# Patient Record
Sex: FEMALE | Race: WHITE | Employment: OTHER
[De-identification: names, ages, dates, MRNs, and addresses within clinical notes are randomized per-mention and may not be internally consistent; named-entity substitution may affect disease eponyms.]

---

## 2017-01-03 RX ORDER — FUROSEMIDE 40 MG/1
TABLET ORAL
Qty: 30 TABLET | Refills: 4 | Status: SHIPPED | OUTPATIENT
Start: 2017-01-03 | End: 2017-06-06 | Stop reason: SDUPTHER

## 2017-01-09 ENCOUNTER — TELEPHONE (OUTPATIENT)
Dept: FAMILY MEDICINE CLINIC | Facility: CLINIC | Age: 62
End: 2017-01-09

## 2017-01-09 RX ORDER — LOVASTATIN 40 MG/1
40 TABLET ORAL NIGHTLY
Qty: 30 TABLET | Refills: 3 | Status: SHIPPED | OUTPATIENT
Start: 2017-01-09 | End: 2017-05-09 | Stop reason: SDUPTHER

## 2017-01-13 ENCOUNTER — TELEPHONE (OUTPATIENT)
Dept: FAMILY MEDICINE CLINIC | Facility: CLINIC | Age: 62
End: 2017-01-13

## 2017-01-17 ENCOUNTER — TELEPHONE (OUTPATIENT)
Dept: FAMILY MEDICINE CLINIC | Facility: CLINIC | Age: 62
End: 2017-01-17

## 2017-02-08 RX ORDER — EZETIMIBE 10 MG/1
TABLET ORAL
Qty: 30 TABLET | Refills: 10 | Status: SHIPPED | OUTPATIENT
Start: 2017-02-08 | End: 2017-07-05

## 2017-03-07 RX ORDER — LEVOTHYROXINE SODIUM 0.15 MG/1
TABLET ORAL
Qty: 30 TABLET | Refills: 8 | Status: SHIPPED | OUTPATIENT
Start: 2017-03-07 | End: 2017-11-06 | Stop reason: SDUPTHER

## 2017-03-11 RX ORDER — BENZTROPINE MESYLATE 2 MG/1
TABLET ORAL
Qty: 60 TABLET | Refills: 1 | Status: SHIPPED | OUTPATIENT
Start: 2017-03-11 | End: 2017-05-09 | Stop reason: SDUPTHER

## 2017-03-17 ENCOUNTER — TELEPHONE (OUTPATIENT)
Dept: FAMILY MEDICINE CLINIC | Age: 62
End: 2017-03-17

## 2017-04-11 ENCOUNTER — TELEPHONE (OUTPATIENT)
Dept: FAMILY MEDICINE CLINIC | Age: 62
End: 2017-04-11

## 2017-04-11 DIAGNOSIS — R25.1 TREMOR: Primary | ICD-10-CM

## 2017-04-19 ENCOUNTER — HOSPITAL ENCOUNTER (OUTPATIENT)
Age: 62
Setting detail: SPECIMEN
Discharge: HOME | End: 2017-04-19
Payer: COMMERCIAL

## 2017-04-19 LAB
ALBUMIN SERPL-MCNC: 4.2 G/DL (ref 3.5–5.2)
ALBUMIN/GLOBULIN RATIO: 1.4 (ref 1–2.5)
ALP BLD-CCNC: 61 U/L (ref 35–104)
ALT SERPL-CCNC: 10 U/L (ref 5–33)
ANION GAP SERPL CALCULATED.3IONS-SCNC: 19 MMOL/L (ref 9–17)
AST SERPL-CCNC: 9 U/L
BILIRUB SERPL-MCNC: 0.38 MG/DL (ref 0.3–1.2)
BUN BLDV-MCNC: 30 MG/DL (ref 6–20)
BUN/CREAT BLD: ABNORMAL (ref 9–20)
CALCIUM SERPL-MCNC: 9.2 MG/DL (ref 8.6–10.4)
CHLORIDE BLD-SCNC: 103 MMOL/L (ref 98–107)
CO2: 23 MMOL/L (ref 20–31)
CREAT SERPL-MCNC: 0.75 MG/DL (ref 0.5–0.9)
FOLATE: 10.4 NG/ML
GFR AFRICAN AMERICAN: >60 ML/MIN
GFR NON-AFRICAN AMERICAN: >60 ML/MIN
GFR SERPL CREATININE-BSD FRML MDRD: ABNORMAL ML/MIN/{1.73_M2}
GFR SERPL CREATININE-BSD FRML MDRD: ABNORMAL ML/MIN/{1.73_M2}
GLUCOSE BLD-MCNC: 85 MG/DL (ref 70–99)
POTASSIUM SERPL-SCNC: 4.6 MMOL/L (ref 3.7–5.3)
SODIUM BLD-SCNC: 145 MMOL/L (ref 135–144)
TOTAL PROTEIN: 7.1 G/DL (ref 6.4–8.3)
TSH SERPL DL<=0.05 MIU/L-ACNC: 0.31 MIU/L (ref 0.3–5)
VITAMIN B-12: 386 PG/ML (ref 211–946)

## 2017-05-09 RX ORDER — BENZTROPINE MESYLATE 2 MG/1
TABLET ORAL
Qty: 60 TABLET | Refills: 0 | Status: SHIPPED | OUTPATIENT
Start: 2017-05-09 | End: 2017-06-06 | Stop reason: SDUPTHER

## 2017-05-18 ENCOUNTER — OFFICE VISIT (OUTPATIENT)
Dept: FAMILY MEDICINE CLINIC | Age: 62
End: 2017-05-18
Payer: MEDICARE

## 2017-05-18 VITALS — SYSTOLIC BLOOD PRESSURE: 103 MMHG | HEART RATE: 94 BPM | DIASTOLIC BLOOD PRESSURE: 75 MMHG | TEMPERATURE: 98.6 F

## 2017-05-18 DIAGNOSIS — G89.29 CHRONIC PAIN OF LEFT ANKLE: ICD-10-CM

## 2017-05-18 DIAGNOSIS — M25.572 CHRONIC PAIN OF LEFT ANKLE: ICD-10-CM

## 2017-05-18 PROCEDURE — 99214 OFFICE O/P EST MOD 30 MIN: CPT | Performed by: PHYSICIAN ASSISTANT

## 2017-05-18 RX ORDER — CLONAZEPAM 0.5 MG/1
1 TABLET ORAL 3 TIMES DAILY
COMMUNITY
Start: 2017-04-18 | End: 2017-07-05

## 2017-06-06 RX ORDER — FUROSEMIDE 40 MG/1
TABLET ORAL
Qty: 30 TABLET | Refills: 3 | Status: SHIPPED | OUTPATIENT
Start: 2017-06-06 | End: 2017-10-08 | Stop reason: SDUPTHER

## 2017-06-06 RX ORDER — BENZTROPINE MESYLATE 2 MG/1
TABLET ORAL
Qty: 60 TABLET | Refills: 0 | Status: SHIPPED | OUTPATIENT
Start: 2017-06-06 | End: 2017-07-05

## 2017-07-05 ENCOUNTER — HOSPITAL ENCOUNTER (EMERGENCY)
Age: 62
Discharge: HOME | End: 2017-07-05
Attending: EMERGENCY MEDICINE
Payer: MEDICARE

## 2017-07-05 VITALS
OXYGEN SATURATION: 97 % | WEIGHT: 185 LBS | BODY MASS INDEX: 29.03 KG/M2 | SYSTOLIC BLOOD PRESSURE: 156 MMHG | TEMPERATURE: 96.1 F | HEART RATE: 78 BPM | HEIGHT: 67 IN | DIASTOLIC BLOOD PRESSURE: 104 MMHG | RESPIRATION RATE: 17 BRPM

## 2017-07-05 DIAGNOSIS — F44.5 PSEUDOSEIZURE: Primary | ICD-10-CM

## 2017-07-05 LAB
PROLACTIN: 7.83 UG/L (ref 4.79–23.3)
VALPROIC ACID LEVEL: 25 UG/ML (ref 50–100)
VALPROIC DATE LAST DOSE: ABNORMAL
VALPROIC DOSE AMOUNT: ABNORMAL
VALPROIC TIME LAST DOSE: ABNORMAL

## 2017-07-05 PROCEDURE — 80164 ASSAY DIPROPYLACETIC ACD TOT: CPT

## 2017-07-05 PROCEDURE — 84146 ASSAY OF PROLACTIN: CPT

## 2017-07-05 PROCEDURE — 99284 EMERGENCY DEPT VISIT MOD MDM: CPT

## 2017-07-05 RX ORDER — PRIMIDONE 50 MG/1
50 TABLET ORAL 2 TIMES DAILY PRN
COMMUNITY
Start: 2017-05-24 | End: 2019-11-12

## 2017-07-05 ASSESSMENT — ENCOUNTER SYMPTOMS
COLOR CHANGE: 0
ABDOMINAL PAIN: 0
WHEEZING: 0
SHORTNESS OF BREATH: 0
RHINORRHEA: 0
NAUSEA: 0
SINUS PRESSURE: 0
COUGH: 0
CONSTIPATION: 0
SORE THROAT: 0
VOMITING: 0
DIARRHEA: 0

## 2017-07-13 ENCOUNTER — OFFICE VISIT (OUTPATIENT)
Dept: FAMILY MEDICINE CLINIC | Age: 62
End: 2017-07-13
Payer: MEDICARE

## 2017-07-13 ENCOUNTER — HOSPITAL ENCOUNTER (OUTPATIENT)
Age: 62
Setting detail: SPECIMEN
Discharge: HOME | End: 2017-07-13
Payer: MEDICARE

## 2017-07-13 VITALS
HEIGHT: 67 IN | HEART RATE: 76 BPM | WEIGHT: 206 LBS | RESPIRATION RATE: 18 BRPM | SYSTOLIC BLOOD PRESSURE: 120 MMHG | OXYGEN SATURATION: 98 % | BODY MASS INDEX: 32.33 KG/M2 | DIASTOLIC BLOOD PRESSURE: 75 MMHG

## 2017-07-13 DIAGNOSIS — F03.90: Primary | ICD-10-CM

## 2017-07-13 DIAGNOSIS — Z13.9 SCREENING: ICD-10-CM

## 2017-07-13 DIAGNOSIS — J42 CHRONIC BRONCHITIS, UNSPECIFIED CHRONIC BRONCHITIS TYPE (HCC): ICD-10-CM

## 2017-07-13 DIAGNOSIS — E11.9 TYPE 2 DIABETES MELLITUS WITHOUT COMPLICATION, WITHOUT LONG-TERM CURRENT USE OF INSULIN (HCC): ICD-10-CM

## 2017-07-13 DIAGNOSIS — J44.9 CHRONIC OBSTRUCTIVE BRONCHITIS (HCC): ICD-10-CM

## 2017-07-13 DIAGNOSIS — E03.9 ACQUIRED HYPOTHYROIDISM: ICD-10-CM

## 2017-07-13 DIAGNOSIS — R07.1 CHEST PAIN ON BREATHING: ICD-10-CM

## 2017-07-13 LAB
CREATININE URINE: 48.9 MG/DL (ref 28–217)
HBA1C MFR BLD: 5.8 %
MICROALBUMIN/CREAT 24H UR: <12 MG/L
MICROALBUMIN/CREAT UR-RTO: 25 MCG/MG CREAT

## 2017-07-13 PROCEDURE — 99214 OFFICE O/P EST MOD 30 MIN: CPT | Performed by: FAMILY MEDICINE

## 2017-07-13 PROCEDURE — 83036 HEMOGLOBIN GLYCOSYLATED A1C: CPT | Performed by: FAMILY MEDICINE

## 2017-07-13 RX ORDER — DONEPEZIL HYDROCHLORIDE 5 MG/1
5 TABLET, FILM COATED ORAL NIGHTLY
Qty: 90 TABLET | Refills: 3 | Status: SHIPPED | OUTPATIENT
Start: 2017-07-13 | End: 2018-08-17

## 2017-07-13 ASSESSMENT — PATIENT HEALTH QUESTIONNAIRE - PHQ9
SUM OF ALL RESPONSES TO PHQ QUESTIONS 1-9: 0
SUM OF ALL RESPONSES TO PHQ9 QUESTIONS 1 & 2: 0
2. FEELING DOWN, DEPRESSED OR HOPELESS: 0
1. LITTLE INTEREST OR PLEASURE IN DOING THINGS: 0

## 2017-07-27 ENCOUNTER — HOSPITAL ENCOUNTER (OUTPATIENT)
Age: 62
Setting detail: SPECIMEN
Discharge: HOME | End: 2017-07-27
Payer: MEDICARE

## 2017-07-27 DIAGNOSIS — Z13.9 SCREENING: ICD-10-CM

## 2017-07-27 LAB
HEPATITIS C ANTIBODY: NONREACTIVE
HIV AG/AB: NONREACTIVE

## 2017-08-11 ENCOUNTER — HOSPITAL ENCOUNTER (OUTPATIENT)
Age: 62
Setting detail: SPECIMEN
Discharge: HOME | End: 2017-08-11
Payer: MEDICARE

## 2017-08-11 LAB
VALPROIC ACID LEVEL: 28 UG/ML (ref 50–100)
VALPROIC DATE LAST DOSE: ABNORMAL
VALPROIC DOSE AMOUNT: ABNORMAL
VALPROIC TIME LAST DOSE: ABNORMAL

## 2017-08-18 ENCOUNTER — HOSPITAL ENCOUNTER (OUTPATIENT)
Dept: MAMMOGRAPHY | Age: 62
Discharge: HOME | End: 2017-08-18
Payer: MEDICARE

## 2017-08-18 DIAGNOSIS — Z12.31 ENCOUNTER FOR SCREENING MAMMOGRAM FOR BREAST CANCER: ICD-10-CM

## 2017-08-18 PROCEDURE — 77063 BREAST TOMOSYNTHESIS BI: CPT

## 2017-08-25 ENCOUNTER — HOSPITAL ENCOUNTER (OUTPATIENT)
Dept: NON INVASIVE DIAGNOSTICS | Age: 62
Discharge: HOME | End: 2017-08-25
Payer: MEDICARE

## 2017-08-25 ENCOUNTER — HOSPITAL ENCOUNTER (OUTPATIENT)
Dept: NUCLEAR MEDICINE | Age: 62
Discharge: HOME | End: 2017-08-25
Payer: MEDICARE

## 2017-08-25 VITALS — SYSTOLIC BLOOD PRESSURE: 118 MMHG | RESPIRATION RATE: 14 BRPM | HEART RATE: 70 BPM | DIASTOLIC BLOOD PRESSURE: 80 MMHG

## 2017-08-25 DIAGNOSIS — R07.1 CHEST PAIN ON BREATHING: ICD-10-CM

## 2017-08-25 DIAGNOSIS — J44.9 CHRONIC OBSTRUCTIVE BRONCHITIS (HCC): ICD-10-CM

## 2017-08-25 LAB
LV EF: 67 %
LVEF MODALITY: NORMAL

## 2017-08-25 PROCEDURE — 3430000000 HC RX DIAGNOSTIC RADIOPHARMACEUTICAL: Performed by: FAMILY MEDICINE

## 2017-08-25 PROCEDURE — 6360000002 HC RX W HCPCS: Performed by: FAMILY MEDICINE

## 2017-08-25 PROCEDURE — 78452 HT MUSCLE IMAGE SPECT MULT: CPT

## 2017-08-25 PROCEDURE — 93017 CV STRESS TEST TRACING ONLY: CPT

## 2017-08-25 PROCEDURE — A9500 TC99M SESTAMIBI: HCPCS | Performed by: FAMILY MEDICINE

## 2017-08-25 RX ORDER — METOPROLOL TARTRATE 5 MG/5ML
2.5 INJECTION INTRAVENOUS PRN
Status: DISCONTINUED | OUTPATIENT
Start: 2017-08-25 | End: 2017-08-26 | Stop reason: HOSPADM

## 2017-08-25 RX ORDER — 0.9 % SODIUM CHLORIDE 0.9 %
250 INTRAVENOUS SOLUTION INTRAVENOUS ONCE
Status: DISCONTINUED | OUTPATIENT
Start: 2017-08-25 | End: 2017-08-26 | Stop reason: HOSPADM

## 2017-08-25 RX ORDER — NITROGLYCERIN 0.4 MG/1
0.4 TABLET SUBLINGUAL EVERY 5 MIN PRN
Status: DISCONTINUED | OUTPATIENT
Start: 2017-08-25 | End: 2017-08-26 | Stop reason: HOSPADM

## 2017-08-25 RX ORDER — SODIUM CHLORIDE 0.9 % (FLUSH) 0.9 %
10 SYRINGE (ML) INJECTION PRN
Status: DISCONTINUED | OUTPATIENT
Start: 2017-08-25 | End: 2017-08-26 | Stop reason: HOSPADM

## 2017-08-25 RX ORDER — AMINOPHYLLINE DIHYDRATE 25 MG/ML
100 INJECTION, SOLUTION INTRAVENOUS
Status: ACTIVE | OUTPATIENT
Start: 2017-08-25 | End: 2017-08-25

## 2017-08-25 RX ADMIN — TETRAKIS(2-METHOXYISOBUTYLISOCYANIDE)COPPER(I) TETRAFLUOROBORATE 42.3 MILLICURIE: 1 INJECTION, POWDER, LYOPHILIZED, FOR SOLUTION INTRAVENOUS at 11:10

## 2017-08-25 RX ADMIN — TETRAKIS(2-METHOXYISOBUTYLISOCYANIDE)COPPER(I) TETRAFLUOROBORATE 18.1 MILLICURIE: 1 INJECTION, POWDER, LYOPHILIZED, FOR SOLUTION INTRAVENOUS at 08:25

## 2017-08-25 RX ADMIN — REGADENOSON 0.4 MG: 0.08 INJECTION, SOLUTION INTRAVENOUS at 08:19

## 2017-09-01 DIAGNOSIS — R94.39 ABNORMAL MYOCARDIAL PERFUSION STUDY: Primary | ICD-10-CM

## 2017-09-08 ENCOUNTER — TELEPHONE (OUTPATIENT)
Dept: FAMILY MEDICINE CLINIC | Age: 62
End: 2017-09-08

## 2017-09-08 DIAGNOSIS — R94.39 OTHER NONSPECIFIC ABNORMAL CARDIOVASCULAR SYSTEM FUNCTION STUDY: Primary | ICD-10-CM

## 2017-09-13 ENCOUNTER — OFFICE VISIT (OUTPATIENT)
Dept: FAMILY MEDICINE CLINIC | Age: 62
End: 2017-09-13
Payer: MEDICARE

## 2017-09-13 VITALS
HEART RATE: 72 BPM | SYSTOLIC BLOOD PRESSURE: 130 MMHG | DIASTOLIC BLOOD PRESSURE: 84 MMHG | TEMPERATURE: 99 F | OXYGEN SATURATION: 98 %

## 2017-09-13 DIAGNOSIS — J20.9 ACUTE BRONCHITIS WITH BRONCHOSPASM: Primary | ICD-10-CM

## 2017-09-13 PROCEDURE — 99214 OFFICE O/P EST MOD 30 MIN: CPT | Performed by: NURSE PRACTITIONER

## 2017-09-13 RX ORDER — GUAIFENESIN AND CODEINE PHOSPHATE 100; 10 MG/5ML; MG/5ML
10 SOLUTION ORAL 4 TIMES DAILY PRN
Qty: 118 ML | Refills: 0 | Status: SHIPPED | OUTPATIENT
Start: 2017-09-13 | End: 2017-12-27 | Stop reason: SDUPTHER

## 2017-09-13 RX ORDER — LEVOFLOXACIN 500 MG/1
500 TABLET, FILM COATED ORAL DAILY
Qty: 10 TABLET | Refills: 0 | Status: SHIPPED | OUTPATIENT
Start: 2017-09-13 | End: 2018-01-03 | Stop reason: SDUPTHER

## 2017-09-13 RX ORDER — RISPERIDONE 2 MG/1
1 TABLET, FILM COATED ORAL 3 TIMES DAILY
COMMUNITY
Start: 2017-09-07 | End: 2019-05-29

## 2017-09-13 ASSESSMENT — ENCOUNTER SYMPTOMS
EYE REDNESS: 0
SHORTNESS OF BREATH: 0
RHINORRHEA: 1
CHEST TIGHTNESS: 0
SINUS PRESSURE: 0
VOICE CHANGE: 0
COUGH: 1
SORE THROAT: 1
EYE DISCHARGE: 0
WHEEZING: 0

## 2017-10-05 ENCOUNTER — HOSPITAL ENCOUNTER (OUTPATIENT)
Age: 62
Setting detail: SPECIMEN
Discharge: HOME | End: 2017-10-05
Payer: MEDICARE

## 2017-10-05 LAB
ANION GAP SERPL CALCULATED.3IONS-SCNC: 10 MMOL/L (ref 9–17)
BUN BLDV-MCNC: 18 MG/DL (ref 6–20)
BUN/CREAT BLD: NORMAL (ref 9–20)
CALCIUM SERPL-MCNC: 9 MG/DL (ref 8.6–10.4)
CHLORIDE BLD-SCNC: 99 MMOL/L (ref 98–107)
CO2: 28 MMOL/L (ref 20–31)
CREAT SERPL-MCNC: 0.71 MG/DL (ref 0.5–0.9)
GFR AFRICAN AMERICAN: >60 ML/MIN
GFR NON-AFRICAN AMERICAN: >60 ML/MIN
GFR SERPL CREATININE-BSD FRML MDRD: NORMAL ML/MIN/{1.73_M2}
GFR SERPL CREATININE-BSD FRML MDRD: NORMAL ML/MIN/{1.73_M2}
GLUCOSE BLD-MCNC: 96 MG/DL (ref 70–99)
HCT VFR BLD CALC: 43.8 % (ref 36–46)
HEMOGLOBIN: 14.7 G/DL (ref 12–16)
MCH RBC QN AUTO: 30 PG (ref 26–34)
MCHC RBC AUTO-ENTMCNC: 33.5 G/DL (ref 31–37)
MCV RBC AUTO: 89.6 FL (ref 80–100)
PDW BLD-RTO: 13.6 % (ref 12.5–15.4)
PLATELET # BLD: 208 K/UL (ref 140–450)
PMV BLD AUTO: 8 FL (ref 6–12)
POTASSIUM SERPL-SCNC: 4.4 MMOL/L (ref 3.7–5.3)
RBC # BLD: 4.89 M/UL (ref 4–5.2)
SODIUM BLD-SCNC: 137 MMOL/L (ref 135–144)
WBC # BLD: 8.2 K/UL (ref 3.5–11)

## 2017-10-10 ENCOUNTER — HOSPITAL ENCOUNTER (OUTPATIENT)
Dept: CARDIAC CATH/INVASIVE PROCEDURES | Age: 62
Discharge: HOME | End: 2017-10-10
Attending: INTERNAL MEDICINE | Admitting: INTERNAL MEDICINE
Payer: MEDICARE

## 2017-10-10 VITALS
HEART RATE: 68 BPM | DIASTOLIC BLOOD PRESSURE: 93 MMHG | HEIGHT: 67 IN | TEMPERATURE: 97.6 F | RESPIRATION RATE: 16 BRPM | BODY MASS INDEX: 32.91 KG/M2 | OXYGEN SATURATION: 95 % | WEIGHT: 209.66 LBS | SYSTOLIC BLOOD PRESSURE: 140 MMHG

## 2017-10-10 PROBLEM — I20.0 UNSTABLE ANGINA (HCC): Status: ACTIVE | Noted: 2017-10-10

## 2017-10-10 LAB
EKG ATRIAL RATE: 67 BPM
EKG P AXIS: 59 DEGREES
EKG P-R INTERVAL: 150 MS
EKG Q-T INTERVAL: 402 MS
EKG QRS DURATION: 68 MS
EKG QTC CALCULATION (BAZETT): 424 MS
EKG R AXIS: -7 DEGREES
EKG T AXIS: 42 DEGREES
EKG VENTRICULAR RATE: 67 BPM

## 2017-10-10 PROCEDURE — C1760 CLOSURE DEV, VASC: HCPCS

## 2017-10-10 PROCEDURE — 6360000002 HC RX W HCPCS

## 2017-10-10 PROCEDURE — C1725 CATH, TRANSLUMIN NON-LASER: HCPCS

## 2017-10-10 PROCEDURE — 93005 ELECTROCARDIOGRAM TRACING: CPT

## 2017-10-10 PROCEDURE — 2500000003 HC RX 250 WO HCPCS

## 2017-10-10 PROCEDURE — 2580000003 HC RX 258: Performed by: INTERNAL MEDICINE

## 2017-10-10 PROCEDURE — 93458 L HRT ARTERY/VENTRICLE ANGIO: CPT | Performed by: INTERNAL MEDICINE

## 2017-10-10 PROCEDURE — C1894 INTRO/SHEATH, NON-LASER: HCPCS

## 2017-10-10 RX ORDER — ONDANSETRON 2 MG/ML
4 INJECTION INTRAMUSCULAR; INTRAVENOUS EVERY 6 HOURS PRN
Status: DISCONTINUED | OUTPATIENT
Start: 2017-10-10 | End: 2017-10-10 | Stop reason: HOSPADM

## 2017-10-10 RX ORDER — SODIUM CHLORIDE 0.9 % (FLUSH) 0.9 %
10 SYRINGE (ML) INJECTION EVERY 12 HOURS SCHEDULED
Status: DISCONTINUED | OUTPATIENT
Start: 2017-10-10 | End: 2017-10-10 | Stop reason: HOSPADM

## 2017-10-10 RX ORDER — SODIUM CHLORIDE 0.9 % (FLUSH) 0.9 %
10 SYRINGE (ML) INJECTION PRN
Status: DISCONTINUED | OUTPATIENT
Start: 2017-10-10 | End: 2017-10-10 | Stop reason: HOSPADM

## 2017-10-10 RX ORDER — SODIUM CHLORIDE 9 MG/ML
INJECTION, SOLUTION INTRAVENOUS CONTINUOUS
Status: DISCONTINUED | OUTPATIENT
Start: 2017-10-10 | End: 2017-10-10 | Stop reason: HOSPADM

## 2017-10-10 RX ORDER — ACETAMINOPHEN 325 MG/1
650 TABLET ORAL EVERY 4 HOURS PRN
Status: DISCONTINUED | OUTPATIENT
Start: 2017-10-10 | End: 2017-10-10 | Stop reason: HOSPADM

## 2017-10-10 RX ORDER — FUROSEMIDE 40 MG/1
TABLET ORAL
Qty: 30 TABLET | Refills: 2 | Status: SHIPPED | OUTPATIENT
Start: 2017-10-10 | End: 2017-11-06 | Stop reason: SDUPTHER

## 2017-10-10 RX ADMIN — SODIUM CHLORIDE: 9 INJECTION, SOLUTION INTRAVENOUS at 14:33

## 2017-10-10 ASSESSMENT — PAIN DESCRIPTION - PROGRESSION: CLINICAL_PROGRESSION: NOT CHANGED

## 2017-10-10 ASSESSMENT — PAIN DESCRIPTION - PAIN TYPE: TYPE: ACUTE PAIN

## 2017-10-10 ASSESSMENT — PAIN DESCRIPTION - FREQUENCY: FREQUENCY: CONTINUOUS

## 2017-10-10 ASSESSMENT — PAIN DESCRIPTION - ONSET: ONSET: GRADUAL

## 2017-10-10 ASSESSMENT — PAIN DESCRIPTION - LOCATION: LOCATION: GROIN

## 2017-10-10 ASSESSMENT — PAIN SCALES - GENERAL: PAINLEVEL_OUTOF10: 5

## 2017-10-10 ASSESSMENT — PAIN DESCRIPTION - DESCRIPTORS: DESCRIPTORS: BURNING

## 2017-10-10 ASSESSMENT — PAIN DESCRIPTION - ORIENTATION: ORIENTATION: RIGHT

## 2017-10-10 ASSESSMENT — PAIN - FUNCTIONAL ASSESSMENT: PAIN_FUNCTIONAL_ASSESSMENT: 0-10

## 2017-10-10 NOTE — BRIEF OP NOTE
Brief Postoperative Note  ______________________________________________________________    Patient: Gama Mejia  YOB: 1955  MRN: [id number]  Date of Procedure: 10/10/2017    Pre-Op Diagnosis: UNSTABLE ANGINA. Post-Op Diagnosis: NO SIGNIFICANT EPICARDIAL CAD. Anesthesia: VERSED 2 MG. FENTANYL 50 MICROGRAMS. SURGEON:  FLOWER Cai MD.  Staff:       Estimated Blood Loss:LESS THAN 5 ML  Complications: None                Drains:      Findings: NO SIGNIFICANT EPICARDIAL CAD. PRESERVED LVEF.     Merissa Larsen MD  Date: 10/10/2017  Time: 3:11 PM

## 2017-10-10 NOTE — POST SEDATION
Sedation Post Procedure Note    Patient Name: Alejandra Johnson   YOB: 1955  Room/Bed: [facility] CATH Pool/NONE  Medical Record Number: [id number]  Date: 10/10/2017   Time: 3:15 PM         Physicians/Assistants: Mauricio Mario MD    Procedure Performed:  CARDIAC CATH.   Post-Sedation Vital Signs:  Vitals:    10/10/17 1357   BP: (!) 144/75   Pulse: 71   Resp: 16   Temp: 98.4 °F (36.9 °C)   SpO2: 99%      Vital signs were reviewed and were stable after the procedure (see flow sheet for vitals)            Post-Sedation Exam: Lungs: clear           Complications: none    Electronically signed by Mauricio Mario MD on 10/10/2017 at 3:15 PM

## 2017-10-10 NOTE — PRE SEDATION
tablet Take 500 mg by mouth 3 times daily  9/28/15  Yes Historical Provider, MD   traZODone (DESYREL) 150 MG tablet Take 150 mg by mouth 2 times daily 2 tablets orally per day 7/24/15  Yes Historical Provider, MD   risperiDONE (RISPERDAL) 2 MG tablet Take 1 tablet by mouth daily 9/7/17   Historical Provider, MD   donepezil (ARICEPT) 5 MG tablet Take 1 tablet by mouth nightly 7/13/17   Lisa Malik, DO   benztropine (COGENTIN) 2 MG tablet TAKE ONE TABLET BY MOUTH TWICE A DAY 7/7/17   Lisa Malik, DO   lovastatin (MEVACOR) 40 MG tablet TAKE ONE TABLET BY MOUTH ONCE NIGHTLY 5/9/17   Lisa Malik, DO   enoxaparin (LOVENOX) 40 MG/0.4ML injection  10/8/16   Historical Provider, MD   mometasone-formoterol (DULERA) 200-5 MCG/ACT inhaler Inhale 2 puffs into the lungs 2 times daily. Rinse mouth after using. 4/24/14   Lisa Malik, DO     Coumadin Use Last 7 Days:  no  Antiplatelet drug therapy use last 7 days: yes   Other anticoagulant use last 7 days: no  Additional Medication Information:  seedatabase. Pre-Sedation Documentation and Exam:   I have personally completed a history, physical exam & review of systems for this patient (see notes).     Mallampati Airway Assessment:  Mallampati Class III - (soft palate & base of uvula are visible)    Prior History of Anesthesia Complications:   none    ASA Classification:  Class 3 - A patient with severe systemic disease that limits activity but is not incapacitating    Sedation/ Anesthesia Plan:   intravenous sedation    Medications Planned:   midazolam (Versed) intravenously    Patient is an appropriate candidate for plan of sedation: yes    Electronically signed by Fernando Stark MD on 10/10/2017 at 2:30 PM

## 2017-11-06 RX ORDER — FUROSEMIDE 40 MG/1
TABLET ORAL
Qty: 90 TABLET | Refills: 1 | Status: SHIPPED | OUTPATIENT
Start: 2017-11-06 | End: 2018-08-17

## 2017-11-06 RX ORDER — LEVOTHYROXINE SODIUM 0.15 MG/1
TABLET ORAL
Qty: 90 TABLET | Refills: 3 | Status: SHIPPED | OUTPATIENT
Start: 2017-11-06 | End: 2018-02-18 | Stop reason: SDUPTHER

## 2017-12-27 ENCOUNTER — OFFICE VISIT (OUTPATIENT)
Dept: FAMILY MEDICINE CLINIC | Age: 62
End: 2017-12-27
Payer: MEDICARE

## 2017-12-27 VITALS
HEART RATE: 92 BPM | SYSTOLIC BLOOD PRESSURE: 130 MMHG | DIASTOLIC BLOOD PRESSURE: 72 MMHG | HEIGHT: 67 IN | BODY MASS INDEX: 34.84 KG/M2 | OXYGEN SATURATION: 98 % | TEMPERATURE: 99.6 F | WEIGHT: 222 LBS

## 2017-12-27 DIAGNOSIS — J40 BRONCHITIS: Primary | ICD-10-CM

## 2017-12-27 DIAGNOSIS — R50.9 FEVER, UNSPECIFIED FEVER CAUSE: ICD-10-CM

## 2017-12-27 LAB
INFLUENZA A ANTIBODY: NEGATIVE
INFLUENZA B ANTIBODY: NEGATIVE

## 2017-12-27 PROCEDURE — 3014F SCREEN MAMMO DOC REV: CPT | Performed by: NURSE PRACTITIONER

## 2017-12-27 PROCEDURE — 99214 OFFICE O/P EST MOD 30 MIN: CPT | Performed by: NURSE PRACTITIONER

## 2017-12-27 PROCEDURE — 3017F COLORECTAL CA SCREEN DOC REV: CPT | Performed by: NURSE PRACTITIONER

## 2017-12-27 PROCEDURE — 87804 INFLUENZA ASSAY W/OPTIC: CPT | Performed by: NURSE PRACTITIONER

## 2017-12-27 PROCEDURE — G8417 CALC BMI ABV UP PARAM F/U: HCPCS | Performed by: NURSE PRACTITIONER

## 2017-12-27 PROCEDURE — 4004F PT TOBACCO SCREEN RCVD TLK: CPT | Performed by: NURSE PRACTITIONER

## 2017-12-27 PROCEDURE — G8427 DOCREV CUR MEDS BY ELIG CLIN: HCPCS | Performed by: NURSE PRACTITIONER

## 2017-12-27 PROCEDURE — G8484 FLU IMMUNIZE NO ADMIN: HCPCS | Performed by: NURSE PRACTITIONER

## 2017-12-27 PROCEDURE — G8598 ASA/ANTIPLAT THER USED: HCPCS | Performed by: NURSE PRACTITIONER

## 2017-12-27 RX ORDER — GUAIFENESIN AND CODEINE PHOSPHATE 100; 10 MG/5ML; MG/5ML
10 SOLUTION ORAL 4 TIMES DAILY PRN
Qty: 118 ML | Refills: 0 | Status: SHIPPED | OUTPATIENT
Start: 2017-12-27 | End: 2018-01-03 | Stop reason: SDUPTHER

## 2017-12-27 RX ORDER — DOXYCYCLINE HYCLATE 100 MG
100 TABLET ORAL 2 TIMES DAILY
Qty: 20 TABLET | Refills: 0 | Status: SHIPPED | OUTPATIENT
Start: 2017-12-27 | End: 2018-01-06

## 2017-12-27 RX ORDER — ALBUTEROL SULFATE 2.5 MG/3ML
2.5 SOLUTION RESPIRATORY (INHALATION) EVERY 6 HOURS PRN
Qty: 120 EACH | Refills: 0 | Status: SHIPPED | OUTPATIENT
Start: 2017-12-27 | End: 2020-08-04

## 2017-12-27 ASSESSMENT — ENCOUNTER SYMPTOMS
EYE DISCHARGE: 0
SINUS PRESSURE: 0
VOICE CHANGE: 0
CHEST TIGHTNESS: 0
EYE REDNESS: 0
SHORTNESS OF BREATH: 1
WHEEZING: 1
SORE THROAT: 1
COUGH: 1

## 2017-12-27 NOTE — PROGRESS NOTES
mLs by mouth 4 times daily as needed for Cough . 118 mL 0    levothyroxine (SYNTHROID) 150 MCG tablet TAKE ONE TABLET BY MOUTH DAILY 90 tablet 3    risperiDONE (RISPERDAL) 2 MG tablet Take 1 tablet by mouth daily      primidone (MYSOLINE) 50 MG tablet Take 50 mg by mouth 2 times daily as needed       ranitidine (ZANTAC) 300 MG capsule Take 300 mg by mouth every evening       lansoprazole (PREVACID) 30 MG capsule Take 30 mg by mouth daily       divalproex (DEPAKOTE) 500 MG DR tablet Take 500 mg by mouth 3 times daily       traZODone (DESYREL) 150 MG tablet Take 150 mg by mouth 2 times daily 2 tablets orally per day      mometasone-formoterol (DULERA) 200-5 MCG/ACT inhaler Inhale 2 puffs into the lungs 2 times daily. Rinse mouth after using. 2 Inhaler 2    furosemide (LASIX) 40 MG tablet TAKE ONE TABLET BY MOUTH DAILY 90 tablet 1    donepezil (ARICEPT) 5 MG tablet Take 1 tablet by mouth nightly 90 tablet 3    benztropine (COGENTIN) 2 MG tablet TAKE ONE TABLET BY MOUTH TWICE A DAY 60 tablet 5    lovastatin (MEVACOR) 40 MG tablet TAKE ONE TABLET BY MOUTH ONCE NIGHTLY 30 tablet 5    enoxaparin (LOVENOX) 40 MG/0.4ML injection        No current facility-administered medications for this visit.       Allergies   Allergen Reactions    Latex Rash    Biaxin [Clarithromycin] Hives    Dicloxacillin Hives    Pcn [Penicillins] Hives    Zithromax [Azithromycin] Hives    Amoxicillin     Exelon [Rivastigmine Tartrate]     Keflex [Cephalexin]     Lithium     Lyrica [Pregabalin]     Magnesium Trisilicate     Magnesium-Containing Compounds     Nystatin     Prednisone     Rivastigmine Tartrate     Seroquel [Quetiapine Fumarate] Other (See Comments)     Excess weight gain     Symbicort [Budesonide-Formoterol Fumarate] Other (See Comments)     Chest pain    Aricept [Donepezil Hydrochloride] Nausea And Vomiting    Donepezil Hcl Nausea Only    Morphine Other (See Comments)     headaches     Subjective: Review of Systems   Constitutional: Positive for chills and fever. Negative for fatigue. HENT: Positive for sore throat. Negative for congestion, ear discharge, ear pain, postnasal drip, sinus pressure, sneezing and voice change. Eyes: Negative for discharge and redness. Respiratory: Positive for cough, shortness of breath and wheezing. Negative for chest tightness. Cardiovascular: Negative. Negative for chest pain. Musculoskeletal: Negative for myalgias. Skin: Negative for rash. Neurological: Positive for headaches. Negative for dizziness, weakness and light-headedness. Hematological: Negative for adenopathy. All other systems reviewed and are negative. Objective:     Physical Exam   Constitutional: She appears well-developed and well-nourished. HENT:   Head: Normocephalic. Right Ear: Tympanic membrane and external ear normal.   Left Ear: Tympanic membrane and external ear normal.   Nose: Nose normal. Right sinus exhibits no maxillary sinus tenderness and no frontal sinus tenderness. Left sinus exhibits no maxillary sinus tenderness and no frontal sinus tenderness. Mouth/Throat: Oropharyngeal exudate (PND) present. No posterior oropharyngeal erythema. Eyes: Right eye exhibits no discharge. Left eye exhibits no discharge. Cardiovascular: Normal rate, regular rhythm and normal heart sounds. No murmur heard. Pulmonary/Chest: Effort normal. No respiratory distress. She has decreased breath sounds. She has no wheezes. Lymphadenopathy:     She has cervical adenopathy. Skin: Skin is warm. No rash noted. She is not diaphoretic. Nursing note and vitals reviewed.     /72 (Site: Left Arm, Position: Sitting, Cuff Size: Medium Adult)   Pulse 92   Temp 99.6 °F (37.6 °C) (Oral)   Ht 5' 6.5\" (1.689 m)   Wt 222 lb (100.7 kg)   SpO2 98%   BMI 35.29 kg/m²     Results for orders placed or performed in visit on 12/27/17   POCT Influenza A/B   Result Value Ref Range    Influenza A Ab negative     Influenza B Ab negative      Assessment:      1. Bronchitis  albuterol (PROVENTIL) (2.5 MG/3ML) 0.083% nebulizer solution    doxycycline hyclate (VIBRA-TABS) 100 MG tablet    guaiFENesin-codeine (TUSSI-ORGANIDIN NR) 100-10 MG/5ML syrup   2. Fever, unspecified fever cause  POCT Influenza A/B     Plan:      Based on the duration and severity of the symptoms-- I will treat this as bacterial at this time with doxycycline x 10 days. Patient instructed to complete antibiotic prescription fully. Albuterol neb treatments sent to the pharmacy. Encouraged her to do them more routinely during this illness. Cough medication sent to the pharmacy. May use Motrin/Tylenol for fever/pain. Saline washes, salt water gargles and over the counter preparations if desired. Follow up if symptoms do not improve. Orders Placed This Encounter   Medications    albuterol (PROVENTIL) (2.5 MG/3ML) 0.083% nebulizer solution     Sig: Take 3 mLs by nebulization every 6 hours as needed for Wheezing     Dispense:  120 each     Refill:  0    doxycycline hyclate (VIBRA-TABS) 100 MG tablet     Sig: Take 1 tablet by mouth 2 times daily for 10 days     Dispense:  20 tablet     Refill:  0    guaiFENesin-codeine (TUSSI-ORGANIDIN NR) 100-10 MG/5ML syrup     Sig: Take 10 mLs by mouth 4 times daily as needed for Cough . Dispense:  118 mL     Refill:  0       Patient given educational materials - see patient instructions. Discussed use, benefit, and side effects of prescribed medications. All patient questions answered. Pt voiced understanding.     Electronically signed by David Fernandez NP on 12/27/2017 at 11:57 AM

## 2018-01-02 ENCOUNTER — HOSPITAL ENCOUNTER (EMERGENCY)
Age: 63
Discharge: LEFT BEFORE DISCHARGE | End: 2018-01-02
Payer: COMMERCIAL

## 2018-01-02 VITALS
OXYGEN SATURATION: 98 % | WEIGHT: 229.6 LBS | RESPIRATION RATE: 14 BRPM | DIASTOLIC BLOOD PRESSURE: 78 MMHG | SYSTOLIC BLOOD PRESSURE: 130 MMHG | BODY MASS INDEX: 36.03 KG/M2 | TEMPERATURE: 97.8 F | HEIGHT: 67 IN | HEART RATE: 102 BPM

## 2018-01-02 PROCEDURE — 4500000002 HC ER NO CHARGE

## 2018-01-03 ENCOUNTER — HOSPITAL ENCOUNTER (OUTPATIENT)
Dept: GENERAL RADIOLOGY | Age: 63
Discharge: HOME | End: 2018-01-03
Payer: COMMERCIAL

## 2018-01-03 ENCOUNTER — HOSPITAL ENCOUNTER (OUTPATIENT)
Age: 63
Discharge: HOME | End: 2018-01-03
Payer: COMMERCIAL

## 2018-01-03 ENCOUNTER — HOSPITAL ENCOUNTER (OUTPATIENT)
Dept: GENERAL RADIOLOGY | Age: 63
End: 2018-01-03
Payer: COMMERCIAL

## 2018-01-03 ENCOUNTER — OFFICE VISIT (OUTPATIENT)
Dept: FAMILY MEDICINE CLINIC | Age: 63
End: 2018-01-03
Payer: MEDICARE

## 2018-01-03 VITALS
DIASTOLIC BLOOD PRESSURE: 70 MMHG | SYSTOLIC BLOOD PRESSURE: 130 MMHG | HEART RATE: 111 BPM | OXYGEN SATURATION: 94 % | TEMPERATURE: 97.8 F

## 2018-01-03 DIAGNOSIS — R06.00 DYSPNEA, UNSPECIFIED TYPE: ICD-10-CM

## 2018-01-03 DIAGNOSIS — R06.00 DYSPNEA AND RESPIRATORY ABNORMALITY: ICD-10-CM

## 2018-01-03 DIAGNOSIS — R06.89 DYSPNEA AND RESPIRATORY ABNORMALITY: ICD-10-CM

## 2018-01-03 DIAGNOSIS — R05.9 COUGH: ICD-10-CM

## 2018-01-03 DIAGNOSIS — J20.9 ACUTE BRONCHITIS WITH BRONCHOSPASM: Primary | ICD-10-CM

## 2018-01-03 PROCEDURE — 94640 AIRWAY INHALATION TREATMENT: CPT | Performed by: NURSE PRACTITIONER

## 2018-01-03 PROCEDURE — 99214 OFFICE O/P EST MOD 30 MIN: CPT | Performed by: NURSE PRACTITIONER

## 2018-01-03 PROCEDURE — 71046 X-RAY EXAM CHEST 2 VIEWS: CPT

## 2018-01-03 RX ORDER — ALBUTEROL SULFATE 2.5 MG/3ML
2.5 SOLUTION RESPIRATORY (INHALATION) ONCE
Status: COMPLETED | OUTPATIENT
Start: 2018-01-03 | End: 2018-01-03

## 2018-01-03 RX ORDER — GUAIFENESIN AND CODEINE PHOSPHATE 100; 10 MG/5ML; MG/5ML
10 SOLUTION ORAL 4 TIMES DAILY PRN
Qty: 118 ML | Refills: 0 | Status: SHIPPED | OUTPATIENT
Start: 2018-01-03 | End: 2018-01-08

## 2018-01-03 RX ORDER — LEVOFLOXACIN 500 MG/1
500 TABLET, FILM COATED ORAL DAILY
Qty: 7 TABLET | Refills: 0 | Status: SHIPPED | OUTPATIENT
Start: 2018-01-03 | End: 2018-01-10

## 2018-01-03 RX ADMIN — ALBUTEROL SULFATE 2.5 MG: 2.5 SOLUTION RESPIRATORY (INHALATION) at 12:45

## 2018-01-03 ASSESSMENT — ENCOUNTER SYMPTOMS
EYE REDNESS: 0
EYE DISCHARGE: 0
SORE THROAT: 1
SHORTNESS OF BREATH: 1
CHEST TIGHTNESS: 0
COUGH: 1
VOICE CHANGE: 1
WHEEZING: 1
SINUS PRESSURE: 0

## 2018-01-03 NOTE — PROGRESS NOTES
Comments)     Chest pain    Aricept [Donepezil Hydrochloride] Nausea And Vomiting    Donepezil Hcl Nausea Only    Morphine Other (See Comments)     headaches     Reviewed PMH, SH, and FH with the patient and updated. Subjective:      Review of Systems   Constitutional: Negative for chills, fatigue and fever. HENT: Positive for postnasal drip, sore throat and voice change. Negative for congestion, ear discharge, ear pain, sinus pressure and sneezing. Eyes: Negative for discharge and redness. Respiratory: Positive for cough, shortness of breath and wheezing. Negative for chest tightness. Cardiovascular: Negative. Negative for chest pain. Musculoskeletal: Positive for myalgias. Skin: Negative for rash. Neurological: Negative for dizziness, weakness, light-headedness and headaches. Hematological: Negative for adenopathy. All other systems reviewed and are negative. Objective:     Physical Exam   Constitutional: She appears well-developed and well-nourished. HENT:   Head: Normocephalic. Right Ear: Tympanic membrane and external ear normal.   Left Ear: Tympanic membrane and external ear normal.   Nose: Nose normal. Right sinus exhibits no maxillary sinus tenderness and no frontal sinus tenderness. Left sinus exhibits no maxillary sinus tenderness and no frontal sinus tenderness. Mouth/Throat: Oropharynx is clear and moist. No oropharyngeal exudate or posterior oropharyngeal erythema. Eyes: Right eye exhibits no discharge. Left eye exhibits no discharge. Cardiovascular: Normal rate, regular rhythm and normal heart sounds. No murmur heard. Pulmonary/Chest: Effort normal. No respiratory distress. She has decreased breath sounds. She has wheezes. Lymphadenopathy:     She has no cervical adenopathy. Skin: Skin is warm. No rash noted. She is not diaphoretic. Nursing note and vitals reviewed.     /70 (Site: Left Arm, Position: Sitting, Cuff Size: Medium Adult)   Pulse 111

## 2018-01-10 ENCOUNTER — HOSPITAL ENCOUNTER (OUTPATIENT)
Dept: CARDIAC CATH/INVASIVE PROCEDURES | Age: 63
Discharge: HOME | End: 2018-01-10
Attending: INTERNAL MEDICINE | Admitting: INTERNAL MEDICINE
Payer: MEDICARE

## 2018-01-10 VITALS
BODY MASS INDEX: 34.5 KG/M2 | TEMPERATURE: 97.2 F | SYSTOLIC BLOOD PRESSURE: 133 MMHG | RESPIRATION RATE: 22 BRPM | OXYGEN SATURATION: 93 % | WEIGHT: 219.8 LBS | DIASTOLIC BLOOD PRESSURE: 74 MMHG | HEIGHT: 67 IN | HEART RATE: 73 BPM

## 2018-01-10 PROCEDURE — 2500000003 HC RX 250 WO HCPCS

## 2018-01-10 PROCEDURE — 6360000002 HC RX W HCPCS

## 2018-01-10 PROCEDURE — 7100000011 HC PHASE II RECOVERY - ADDTL 15 MIN

## 2018-01-10 PROCEDURE — C1764 EVENT RECORDER, CARDIAC: HCPCS

## 2018-01-10 PROCEDURE — 7100000010 HC PHASE II RECOVERY - FIRST 15 MIN

## 2018-01-10 PROCEDURE — 2580000003 HC RX 258: Performed by: INTERNAL MEDICINE

## 2018-01-10 PROCEDURE — 33282 HC IMPANTABLE LOOP RECORDER: CPT | Performed by: INTERNAL MEDICINE

## 2018-01-10 RX ORDER — VANCOMYCIN HYDROCHLORIDE 1 G/200ML
1000 INJECTION, SOLUTION INTRAVENOUS ONCE
Status: DISCONTINUED | OUTPATIENT
Start: 2018-01-10 | End: 2018-01-10 | Stop reason: HOSPADM

## 2018-01-10 RX ORDER — SODIUM CHLORIDE 9 MG/ML
INJECTION, SOLUTION INTRAVENOUS ONCE
Status: COMPLETED | OUTPATIENT
Start: 2018-01-10 | End: 2018-01-10

## 2018-01-10 RX ORDER — SODIUM CHLORIDE 450 MG/100ML
INJECTION, SOLUTION INTRAVENOUS CONTINUOUS
Status: DISCONTINUED | OUTPATIENT
Start: 2018-01-10 | End: 2018-01-10

## 2018-01-10 RX ADMIN — SODIUM CHLORIDE: 9 INJECTION, SOLUTION INTRAVENOUS at 11:05

## 2018-01-10 ASSESSMENT — PAIN SCALES - GENERAL
PAINLEVEL_OUTOF10: 0

## 2018-01-10 ASSESSMENT — PAIN - FUNCTIONAL ASSESSMENT: PAIN_FUNCTIONAL_ASSESSMENT: 0-10

## 2018-01-10 NOTE — POST SEDATION
Sedation Post Procedure Note    Patient Name: Kevin Abdi   YOB: 1955  Room/Bed: [facility]  Medical Record Number: [id number]  Date: 1/10/2018   Time: 12:39 PM         Physicians/Assistants: Blaze Bonilla MD    Procedure Performed:  LOOP RECORDER IMPLANT.     Post-Sedation Vital Signs:  Vitals:    01/10/18 1049   BP: 133/72   Pulse: 82   Resp: 16   Temp: 97.7 °F (36.5 °C)   SpO2: 97%      Vital signs were reviewed and were stable after the procedure (see flow sheet for vitals)            Post-Sedation Exam: Lungs: clear           Complications: none    Electronically signed by Blaze Bonilla MD on 1/10/2018 at 12:39 PM

## 2018-01-10 NOTE — PRE SEDATION
Sedation Pre-Procedure Note    Patient Name: Eliu Hollingsworth   YOB: 1955  Room/Bed: [facility]  Medical Record Number: [id number]  Date: 1/10/2018   Time: 12:20 PM       Indication:  Near syncope    Consent: I have discussed with the patient and/or the patient representative the indication, alternatives, and the possible risks and/or complications of the planned procedure and the anesthesia methods. The patient and/or patient representative appear to understand and agree to proceed. Vital Signs:   Vitals:    01/10/18 1049   BP: 133/72   Pulse: 82   Resp: 16   Temp: 97.7 °F (36.5 °C)   SpO2: 97%       Past Medical History:   has a past medical history of Alzheimer disease; Alzheimer's disease; Bipolar 2 disorder, major depressive episode ([facility] [address] COPD (chronic obstructive pulmonary disease) ([facility] [address] Depression; Diabetes mellitus ([facility] [address] Hyperlipidemia; Hypertension; NASIR (obstructive sleep apnea); Osteoporosis; Peripheral neuropathy ([facility] [address] Schizophrenia ([facility] [address] Seizures ([facility] [address] and Thyroid disease. Past Surgical History:   has a past surgical history that includes Cholecystectomy; Appendectomy; Hysterectomy; and fracture surgery. Medications:   Scheduled Meds:    vancomycin  1,000 mg Intravenous Once     Continuous Infusions:    PRN Meds:   Home Meds:   Prior to Admission medications    Medication Sig Start Date End Date Taking?  Authorizing Provider   albuterol (PROVENTIL) (2.5 MG/3ML) 0.083% nebulizer solution Take 3 mLs by nebulization every 6 hours as needed for Wheezing 12/27/17  Yes Tiffanie Mchugh NP   levothyroxine (SYNTHROID) 150 MCG tablet TAKE ONE TABLET BY MOUTH DAILY 11/6/17  Yes Lisa Malik DO   risperiDONE (RISPERDAL) 2 MG tablet Take 1 tablet by mouth daily 9/7/17  Yes Historical Provider, MD   primidone (MYSOLINE) 50 MG tablet Take 50 mg by mouth 2 times daily as needed  5/24/17  Yes Historical Provider, MD   ranitidine (ZANTAC) 300 MG capsule Take 300 mg by mouth every evening  6/6/16  Yes Historical Provider, MD   lansoprazole (PREVACID) 30 MG capsule Take 30 mg by mouth daily  2/15/16  Yes Historical Provider, MD   divalproex (DEPAKOTE) 500 MG DR tablet Take 500 mg by mouth 3 times daily  9/28/15  Yes Historical Provider, MD   traZODone (DESYREL) 150 MG tablet Take 150 mg by mouth 2 times daily 2 tablets orally per day 7/24/15  Yes Historical Provider, MD   mometasone-formoterol (DULERA) 200-5 MCG/ACT inhaler Inhale 2 puffs into the lungs 2 times daily. Rinse mouth after using. 4/24/14  Yes Lisa Malik,    levofloxacin (LEVAQUIN) 500 MG tablet Take 1 tablet by mouth daily for 7 days 1/3/18 1/10/18  Aury Ramirez NP   furosemide (LASIX) 40 MG tablet TAKE ONE TABLET BY MOUTH DAILY 11/6/17   Lisa Malik, DO   donepezil (ARICEPT) 5 MG tablet Take 1 tablet by mouth nightly 7/13/17   Lisa Malik, DO   benztropine (COGENTIN) 2 MG tablet TAKE ONE TABLET BY MOUTH TWICE A DAY 7/7/17   Lisa Malik, DO   lovastatin (MEVACOR) 40 MG tablet TAKE ONE TABLET BY MOUTH ONCE NIGHTLY 5/9/17   Lisa Malik, DO     Coumadin Use Last 7 Days:  no  Antiplatelet drug therapy use last 7 days: yes   Other anticoagulant use last 7 days: no  Additional Medication Information:        Pre-Sedation Documentation and Exam:   I have personally completed a history, physical exam & review of systems for this patient (see notes).     Mallampati Airway Assessment:  Mallampati Class III - (soft palate & base of uvula are visible)    Prior History of Anesthesia Complications:   none    ASA Classification:  Class 3 - A patient with severe systemic disease that limits activity but is not incapacitating    Sedation/ Anesthesia Plan:   intravenous sedation    Medications Planned:   midazolam (Versed) intravenously and fentanyl intravenously    Patient is an appropriate candidate for plan of sedation: yes    Electronically signed by Alex Gilliland MD on 1/10/2018 at 12:20 PM

## 2018-01-23 RX ORDER — LOVASTATIN 40 MG/1
TABLET ORAL
Qty: 30 TABLET | Refills: 2 | Status: SHIPPED | OUTPATIENT
Start: 2018-01-23 | End: 2018-08-17

## 2018-02-20 RX ORDER — LEVOTHYROXINE SODIUM 0.15 MG/1
TABLET ORAL
Qty: 30 TABLET | Refills: 7 | Status: SHIPPED | OUTPATIENT
Start: 2018-02-20 | End: 2018-10-25 | Stop reason: SDUPTHER

## 2018-02-20 NOTE — TELEPHONE ENCOUNTER
Medication: levothyroxine 150 MCG  Last visit: 1/3/18  Next visit: Visit date not found  Last refill: 11/6/17  [facility]  PCP:           No primary care provider on file.

## 2018-03-16 ENCOUNTER — OFFICE VISIT (OUTPATIENT)
Dept: FAMILY MEDICINE CLINIC | Age: 63
End: 2018-03-16
Payer: MEDICARE

## 2018-03-16 VITALS
HEIGHT: 67 IN | BODY MASS INDEX: 35.16 KG/M2 | SYSTOLIC BLOOD PRESSURE: 135 MMHG | OXYGEN SATURATION: 97 % | TEMPERATURE: 97.1 F | DIASTOLIC BLOOD PRESSURE: 83 MMHG | HEART RATE: 90 BPM | WEIGHT: 224 LBS

## 2018-03-16 DIAGNOSIS — Z74.1 REQUIRES DAILY ASSISTANCE FOR ACTIVITIES OF DAILY LIVING (ADL) AND COMFORT NEEDS: ICD-10-CM

## 2018-03-16 DIAGNOSIS — E03.9 HYPOTHYROIDISM, UNSPECIFIED TYPE: Primary | ICD-10-CM

## 2018-03-16 DIAGNOSIS — Z00.00 ROUTINE GENERAL MEDICAL EXAMINATION AT A HEALTH CARE FACILITY: ICD-10-CM

## 2018-03-16 DIAGNOSIS — J45.20 MILD INTERMITTENT ASTHMA WITHOUT COMPLICATION: ICD-10-CM

## 2018-03-16 DIAGNOSIS — E78.1 HYPERTRIGLYCERIDEMIA: ICD-10-CM

## 2018-03-16 DIAGNOSIS — J44.1 ACUTE EXACERBATION OF CHRONIC OBSTRUCTIVE PULMONARY DISEASE (COPD) (HCC): ICD-10-CM

## 2018-03-16 DIAGNOSIS — Z13.220 SCREENING FOR LIPID DISORDERS: ICD-10-CM

## 2018-03-16 DIAGNOSIS — Z76.89 ESTABLISHING CARE WITH NEW DOCTOR, ENCOUNTER FOR: ICD-10-CM

## 2018-03-16 DIAGNOSIS — E55.9 VITAMIN D DEFICIENCY: ICD-10-CM

## 2018-03-16 PROCEDURE — 99213 OFFICE O/P EST LOW 20 MIN: CPT | Performed by: NURSE PRACTITIONER

## 2018-03-16 ASSESSMENT — ENCOUNTER SYMPTOMS
SHORTNESS OF BREATH: 1
DIARRHEA: 0
COUGH: 0
VOMITING: 0
CHEST TIGHTNESS: 0
WHEEZING: 1
CONSTIPATION: 0
NAUSEA: 0
ABDOMINAL PAIN: 0

## 2018-03-16 NOTE — PROGRESS NOTES
[address]  [place], [address]  [phone]      Jorge Pan is a 63 y.o. female who presents today for her  medical conditions/complaints as noted below. Jorge Pan is c/o of New Patient (declines pap, does not get flu vaccine. not taking meds for DM,on meds in past 7 yrs ago. ) and Dementia (on 10th grade level,sees psych but unable to talk to about how to keep herself up better)  . HPI:   Pt here to [facility]. She had insurance changed and needed new pcp. Sees vivian terry for neuro, dr Hallie Schultz for GI and psychiatry. She is struggling with taking meds daily ( despite pill box reminder) and making appt ( overdue for gi and neuro), remembering to take showers and clean or cook regularly. She is unsure when she last took her thyroid meds and or other meds. Seems only to remember night time meds.  does live with her. She was dx'd with alzheimers per dr Fred Nieves. She does need refill on dulera for asthma.          Past Medical History:   Diagnosis Date    Alzheimer disease     Alzheimer's disease     Asthma     Bipolar 2 disorder, major depressive episode ([facility] [address]     COPD (chronic obstructive pulmonary disease) (HCC)     Depression     Diabetes mellitus ([facility] [address]     Hyperlipidemia     Hypertension     Hypothyroidism 3/16/2018    Mild intermittent asthma without complication 8/93/9501    NASIR (obstructive sleep apnea)     Osteoporosis     Peripheral neuropathy (HCC)     Schizophrenia (HCC)     Seizures (HCC)     pseudo seizures    Thyroid disease     hypothyroidism      Past Surgical History:   Procedure Laterality Date    APPENDECTOMY      CHOLECYSTECTOMY      FRACTURE SURGERY      left ankle    HYSTERECTOMY      polyps    HYSTERECTOMY, TOTAL ABDOMINAL      INSERTABLE CARDIAC MONITOR Left 01/10/2018     Family History   Problem Relation Age of Onset    Cancer Mother      skin    Heart Disease Mother     Thyroid Disease Mother  Stroke Mother     Heart Disease Father     High Blood Pressure Father     Stroke Father      Social History   Substance Use Topics    Smoking status: Current Every Day Smoker     Packs/day: 0.50     Years: 50.00     Types: Cigarettes    Smokeless tobacco: Never Used    Alcohol use No      Current Outpatient Prescriptions   Medication Sig Dispense Refill    mometasone-formoterol (DULERA) 200-5 MCG/ACT inhaler Inhale 2 puffs into the lungs 2 times daily Rinse mouth after using. 2 Inhaler 2    levothyroxine (SYNTHROID) 150 MCG tablet TAKE ONE TABLET BY MOUTH DAILY 30 tablet 7    risperiDONE (RISPERDAL) 2 MG tablet Take 1 tablet by mouth daily      primidone (MYSOLINE) 50 MG tablet Take 50 mg by mouth 2 times daily as needed       ranitidine (ZANTAC) 300 MG capsule Take 300 mg by mouth every evening       lansoprazole (PREVACID) 30 MG capsule Take 30 mg by mouth daily       divalproex (DEPAKOTE) 500 MG DR tablet Take 500 mg by mouth 3 times daily       traZODone (DESYREL) 150 MG tablet Take 150 mg by mouth 2 times daily 2 tablets orally per day      lovastatin (MEVACOR) 40 MG tablet TAKE ONE TABLET BY MOUTH ONCE NIGHTLY 30 tablet 2    albuterol (PROVENTIL) (2.5 MG/3ML) 0.083% nebulizer solution Take 3 mLs by nebulization every 6 hours as needed for Wheezing 120 each 0    furosemide (LASIX) 40 MG tablet TAKE ONE TABLET BY MOUTH DAILY 90 tablet 1    donepezil (ARICEPT) 5 MG tablet Take 1 tablet by mouth nightly 90 tablet 3    benztropine (COGENTIN) 2 MG tablet TAKE ONE TABLET BY MOUTH TWICE A DAY 60 tablet 5    lovastatin (MEVACOR) 40 MG tablet TAKE ONE TABLET BY MOUTH ONCE NIGHTLY 30 tablet 5     No current facility-administered medications for this visit.       Allergies   Allergen Reactions    Latex Rash    Biaxin [Clarithromycin] Hives    Dicloxacillin Hives    Pcn [Penicillins] Hives    Zithromax [Azithromycin] Hives    Amoxicillin     Exelon [Rivastigmine Tartrate]     Keflex Standing Expiration Date:   3/16/2019    TSH without Reflex     Standing Status:   Future     Standing Expiration Date:   3/16/2019     Orders Placed This Encounter   Medications    mometasone-formoterol (DULERA) 200-5 MCG/ACT inhaler     Sig: Inhale 2 puffs into the lungs 2 times daily Rinse mouth after using. Dispense:  2 Inhaler     Refill:  2   update other labs when restart on all daily  Refill dulera  Other meds as rx  Hard rx given for home health care and nursing for med mgmt daily  Make f/u appt with neuro and GI today  Continue with psych  States she had colonoscopy last 2=3 years? ?    Patient given educational materials - see patient instructions. Discussed use, benefit, and side effects of prescribed medications. All patient questions answered. Pt voiced understanding. Reviewed health maintenance. Instructed to continue current medications, diet and exercise.     Electronically signed by Ashlee Robertson CNP on 3/16/2018 at 2:21 PM

## 2018-03-27 ENCOUNTER — TELEPHONE (OUTPATIENT)
Dept: FAMILY MEDICINE CLINIC | Age: 63
End: 2018-03-27

## 2018-04-27 ENCOUNTER — OFFICE VISIT (OUTPATIENT)
Dept: FAMILY MEDICINE CLINIC | Age: 63
End: 2018-04-27
Payer: MEDICARE

## 2018-04-27 VITALS
TEMPERATURE: 99 F | HEIGHT: 67 IN | SYSTOLIC BLOOD PRESSURE: 136 MMHG | DIASTOLIC BLOOD PRESSURE: 68 MMHG | RESPIRATION RATE: 20 BRPM | HEART RATE: 103 BPM | BODY MASS INDEX: 34.06 KG/M2 | OXYGEN SATURATION: 94 % | WEIGHT: 217 LBS

## 2018-04-27 DIAGNOSIS — B02.8 HERPES ZOSTER WITH OTHER COMPLICATION: Primary | ICD-10-CM

## 2018-04-27 DIAGNOSIS — H57.12 LEFT EYE PAIN: ICD-10-CM

## 2018-04-27 PROCEDURE — 99213 OFFICE O/P EST LOW 20 MIN: CPT | Performed by: NURSE PRACTITIONER

## 2018-04-27 RX ORDER — DIVALPROEX SODIUM 250 MG/1
TABLET, EXTENDED RELEASE ORAL
COMMUNITY
Start: 2018-04-03 | End: 2021-03-22

## 2018-04-27 RX ORDER — PRAZOSIN HYDROCHLORIDE 2 MG/1
CAPSULE ORAL
COMMUNITY
Start: 2018-04-03 | End: 2018-08-17

## 2018-04-27 RX ORDER — ACYCLOVIR 800 MG/1
800 TABLET ORAL
Qty: 50 TABLET | Refills: 0 | Status: SHIPPED | OUTPATIENT
Start: 2018-04-27 | End: 2018-05-07

## 2018-04-27 RX ORDER — RANITIDINE 300 MG/1
TABLET ORAL
COMMUNITY
Start: 2018-03-24 | End: 2018-04-27 | Stop reason: DRUGHIGH

## 2018-04-27 RX ORDER — METOCLOPRAMIDE HYDROCHLORIDE 5 MG/5ML
SOLUTION ORAL
COMMUNITY
Start: 2018-04-25 | End: 2019-11-12

## 2018-04-27 ASSESSMENT — ENCOUNTER SYMPTOMS
NAUSEA: 1
EYE DISCHARGE: 1
VOMITING: 0
EYE REDNESS: 1

## 2018-04-30 ENCOUNTER — OFFICE VISIT (OUTPATIENT)
Dept: FAMILY MEDICINE CLINIC | Age: 63
End: 2018-04-30
Payer: MEDICARE

## 2018-04-30 VITALS
OXYGEN SATURATION: 96 % | BODY MASS INDEX: 34.05 KG/M2 | TEMPERATURE: 97.5 F | RESPIRATION RATE: 18 BRPM | HEIGHT: 67 IN | WEIGHT: 216.93 LBS | SYSTOLIC BLOOD PRESSURE: 130 MMHG | HEART RATE: 84 BPM | DIASTOLIC BLOOD PRESSURE: 82 MMHG

## 2018-04-30 DIAGNOSIS — L03.213 PERIORBITAL CELLULITIS OF LEFT EYE: ICD-10-CM

## 2018-04-30 DIAGNOSIS — B02.30 HERPES ZOSTER WITH OPHTHALMIC COMPLICATION, UNSPECIFIED HERPES ZOSTER EYE DISEASE: Primary | ICD-10-CM

## 2018-04-30 PROCEDURE — 99214 OFFICE O/P EST MOD 30 MIN: CPT | Performed by: NURSE PRACTITIONER

## 2018-04-30 RX ORDER — DOXYCYCLINE HYCLATE 100 MG
100 TABLET ORAL 2 TIMES DAILY
Qty: 20 TABLET | Refills: 0 | Status: SHIPPED | OUTPATIENT
Start: 2018-04-30 | End: 2018-05-10

## 2018-04-30 ASSESSMENT — ENCOUNTER SYMPTOMS
SINUS PRESSURE: 0
SORE THROAT: 0
EYE PAIN: 1
SHORTNESS OF BREATH: 0
EYE REDNESS: 1
CHEST TIGHTNESS: 0
WHEEZING: 0
COUGH: 0
EYE ITCHING: 0
EYE DISCHARGE: 0
VOICE CHANGE: 0

## 2018-05-01 ENCOUNTER — CLINICAL DOCUMENTATION (OUTPATIENT)
Dept: FAMILY MEDICINE CLINIC | Age: 63
End: 2018-05-01

## 2018-05-01 ENCOUNTER — HOSPITAL ENCOUNTER (EMERGENCY)
Age: 63
Discharge: HOME | End: 2018-05-01
Payer: MEDICARE

## 2018-05-01 VITALS
HEART RATE: 107 BPM | TEMPERATURE: 98.1 F | WEIGHT: 217 LBS | BODY MASS INDEX: 34.06 KG/M2 | HEIGHT: 67 IN | DIASTOLIC BLOOD PRESSURE: 109 MMHG | SYSTOLIC BLOOD PRESSURE: 136 MMHG | OXYGEN SATURATION: 95 % | RESPIRATION RATE: 16 BRPM

## 2018-05-01 DIAGNOSIS — B02.9 HERPES ZOSTER WITHOUT COMPLICATION: Primary | ICD-10-CM

## 2018-05-01 DIAGNOSIS — L03.213 PERIORBITAL CELLULITIS OF LEFT EYE: ICD-10-CM

## 2018-05-01 LAB
ABSOLUTE EOS #: 0.1 K/UL (ref 0–0.4)
ABSOLUTE IMMATURE GRANULOCYTE: ABNORMAL K/UL (ref 0–0.3)
ABSOLUTE LYMPH #: 2.1 K/UL (ref 1–4.8)
ABSOLUTE MONO #: 0.5 K/UL (ref 0.2–0.8)
ANION GAP SERPL CALCULATED.3IONS-SCNC: 12 MMOL/L (ref 9–17)
BASOPHILS # BLD: 0 % (ref 0–2)
BASOPHILS ABSOLUTE: 0 K/UL (ref 0–0.2)
BUN BLDV-MCNC: 11 MG/DL (ref 8–23)
BUN/CREAT BLD: 14 (ref 9–20)
CALCIUM SERPL-MCNC: 8.8 MG/DL (ref 8.6–10.4)
CHLORIDE BLD-SCNC: 97 MMOL/L (ref 98–107)
CO2: 25 MMOL/L (ref 20–31)
CREAT SERPL-MCNC: 0.8 MG/DL (ref 0.5–0.9)
DIFFERENTIAL TYPE: ABNORMAL
EOSINOPHILS RELATIVE PERCENT: 1 % (ref 1–4)
GFR AFRICAN AMERICAN: >60 ML/MIN
GFR NON-AFRICAN AMERICAN: >60 ML/MIN
GFR SERPL CREATININE-BSD FRML MDRD: ABNORMAL ML/MIN/{1.73_M2}
GFR SERPL CREATININE-BSD FRML MDRD: ABNORMAL ML/MIN/{1.73_M2}
GLUCOSE BLD-MCNC: 93 MG/DL (ref 70–99)
HCT VFR BLD CALC: 43.1 % (ref 36–46)
HEMOGLOBIN: 14.4 G/DL (ref 12–16)
IMMATURE GRANULOCYTES: ABNORMAL %
LACTIC ACID: 1.1 MMOL/L (ref 0.5–2.2)
LYMPHOCYTES # BLD: 26 % (ref 24–44)
MCH RBC QN AUTO: 30.3 PG (ref 26–34)
MCHC RBC AUTO-ENTMCNC: 33.5 G/DL (ref 31–37)
MCV RBC AUTO: 90.3 FL (ref 80–100)
MONOCYTES # BLD: 6 % (ref 1–7)
NRBC AUTOMATED: ABNORMAL PER 100 WBC
PDW BLD-RTO: 13.7 % (ref 11.5–14.5)
PLATELET # BLD: 188 K/UL (ref 130–400)
PLATELET ESTIMATE: ABNORMAL
PMV BLD AUTO: 7.2 FL (ref 6–12)
POTASSIUM SERPL-SCNC: 4.5 MMOL/L (ref 3.7–5.3)
RBC # BLD: 4.77 M/UL (ref 4–5.2)
RBC # BLD: ABNORMAL 10*6/UL
SEG NEUTROPHILS: 67 % (ref 36–66)
SEGMENTED NEUTROPHILS ABSOLUTE COUNT: 5.5 K/UL (ref 1.8–7.7)
SODIUM BLD-SCNC: 134 MMOL/L (ref 135–144)
WBC # BLD: 8.2 K/UL (ref 3.5–11)
WBC # BLD: ABNORMAL 10*3/UL

## 2018-05-01 PROCEDURE — 99283 EMERGENCY DEPT VISIT LOW MDM: CPT

## 2018-05-01 PROCEDURE — 96365 THER/PROPH/DIAG IV INF INIT: CPT

## 2018-05-01 PROCEDURE — 80048 BASIC METABOLIC PNL TOTAL CA: CPT

## 2018-05-01 PROCEDURE — 6360000002 HC RX W HCPCS

## 2018-05-01 PROCEDURE — 6370000000 HC RX 637 (ALT 250 FOR IP): Performed by: PHYSICIAN ASSISTANT

## 2018-05-01 PROCEDURE — 96366 THER/PROPH/DIAG IV INF ADDON: CPT

## 2018-05-01 PROCEDURE — 83605 ASSAY OF LACTIC ACID: CPT

## 2018-05-01 PROCEDURE — 2580000003 HC RX 258

## 2018-05-01 PROCEDURE — 6360000002 HC RX W HCPCS: Performed by: PHYSICIAN ASSISTANT

## 2018-05-01 PROCEDURE — 87040 BLOOD CULTURE FOR BACTERIA: CPT

## 2018-05-01 PROCEDURE — 85025 COMPLETE CBC W/AUTO DIFF WBC: CPT

## 2018-05-01 RX ORDER — HYDROCODONE BITARTRATE AND ACETAMINOPHEN 5; 325 MG/1; MG/1
1 TABLET ORAL 3 TIMES DAILY
Qty: 15 TABLET | Refills: 0 | Status: SHIPPED | OUTPATIENT
Start: 2018-05-01 | End: 2018-05-06

## 2018-05-01 RX ORDER — HYDROCODONE BITARTRATE AND ACETAMINOPHEN 5; 325 MG/1; MG/1
1 TABLET ORAL ONCE
Status: COMPLETED | OUTPATIENT
Start: 2018-05-01 | End: 2018-05-01

## 2018-05-01 RX ORDER — ONDANSETRON 4 MG/1
4 TABLET, ORALLY DISINTEGRATING ORAL ONCE
Status: COMPLETED | OUTPATIENT
Start: 2018-05-01 | End: 2018-05-01

## 2018-05-01 RX ORDER — HYDROCODONE BITARTRATE AND ACETAMINOPHEN 5; 325 MG/1; MG/1
1 TABLET ORAL ONCE
Status: DISCONTINUED | OUTPATIENT
Start: 2018-05-01 | End: 2018-05-01

## 2018-05-01 RX ADMIN — ONDANSETRON 4 MG: 4 TABLET, ORALLY DISINTEGRATING ORAL at 11:07

## 2018-05-01 RX ADMIN — DALBAVANCIN 1500 MG: 500 INJECTION, POWDER, FOR SOLUTION INTRAVENOUS at 14:02

## 2018-05-01 RX ADMIN — HYDROCODONE BITARTRATE AND ACETAMINOPHEN 1 TABLET: 5; 325 TABLET ORAL at 11:07

## 2018-05-01 ASSESSMENT — PAIN DESCRIPTION - DESCRIPTORS: DESCRIPTORS: TENDER

## 2018-05-01 ASSESSMENT — PAIN SCALES - GENERAL
PAINLEVEL_OUTOF10: 10
PAINLEVEL_OUTOF10: 10

## 2018-05-01 ASSESSMENT — PAIN DESCRIPTION - LOCATION: LOCATION: EYE

## 2018-05-01 ASSESSMENT — PAIN DESCRIPTION - PAIN TYPE: TYPE: ACUTE PAIN

## 2018-05-01 ASSESSMENT — PAIN DESCRIPTION - ORIENTATION: ORIENTATION: LEFT

## 2018-05-07 LAB
CULTURE: NORMAL
Lab: NORMAL
Lab: NORMAL
SPECIMEN DESCRIPTION: NORMAL
STATUS: NORMAL
STATUS: NORMAL

## 2018-08-17 ENCOUNTER — OFFICE VISIT (OUTPATIENT)
Dept: FAMILY MEDICINE CLINIC | Age: 63
End: 2018-08-17
Payer: MEDICARE

## 2018-08-17 VITALS
DIASTOLIC BLOOD PRESSURE: 82 MMHG | TEMPERATURE: 97.9 F | OXYGEN SATURATION: 95 % | SYSTOLIC BLOOD PRESSURE: 110 MMHG | HEART RATE: 95 BPM

## 2018-08-17 DIAGNOSIS — J20.9 ACUTE BRONCHITIS, UNSPECIFIED ORGANISM: ICD-10-CM

## 2018-08-17 DIAGNOSIS — R05.9 COUGH: Primary | ICD-10-CM

## 2018-08-17 DIAGNOSIS — J04.0 LARYNGITIS: ICD-10-CM

## 2018-08-17 DIAGNOSIS — J44.1 COPD EXACERBATION (HCC): ICD-10-CM

## 2018-08-17 PROCEDURE — 99214 OFFICE O/P EST MOD 30 MIN: CPT | Performed by: NURSE PRACTITIONER

## 2018-08-17 RX ORDER — ALBUTEROL SULFATE 2.5 MG/3ML
2.5 SOLUTION RESPIRATORY (INHALATION) ONCE
Status: COMPLETED | OUTPATIENT
Start: 2018-08-17 | End: 2018-08-17

## 2018-08-17 RX ORDER — GUAIFENESIN AND CODEINE PHOSPHATE 100; 10 MG/5ML; MG/5ML
5 SOLUTION ORAL 3 TIMES DAILY PRN
Qty: 100 ML | Refills: 0 | Status: SHIPPED | OUTPATIENT
Start: 2018-08-17 | End: 2018-08-24

## 2018-08-17 RX ORDER — DOXYCYCLINE HYCLATE 100 MG
100 TABLET ORAL 2 TIMES DAILY
Qty: 20 TABLET | Refills: 0 | Status: SHIPPED | OUTPATIENT
Start: 2018-08-17 | End: 2018-08-27

## 2018-08-17 RX ORDER — RANITIDINE 300 MG/1
1 TABLET ORAL DAILY
COMMUNITY
Start: 2018-06-27 | End: 2019-11-12

## 2018-08-17 RX ADMIN — ALBUTEROL SULFATE 2.5 MG: 2.5 SOLUTION RESPIRATORY (INHALATION) at 18:43

## 2018-08-17 ASSESSMENT — ENCOUNTER SYMPTOMS
COUGH: 1
SHORTNESS OF BREATH: 1
HEMOPTYSIS: 0
WHEEZING: 1

## 2018-08-17 NOTE — PATIENT INSTRUCTIONS
Use your nebulizer as directed  Push fluids  Stop smoking  Recheck for worsening, change or concern- fever, chest pain, feeling worse  Follow up with primary care next week  Patient Education        Bronchitis: Care Instructions  Your Care Instructions    Bronchitis is inflammation of the bronchial tubes, which carry air to the lungs. The tubes swell and produce mucus, or phlegm. The mucus and inflamed bronchial tubes make you cough. You may have trouble breathing. Most cases of bronchitis are caused by viruses like those that cause colds. Antibiotics usually do not help and they may be harmful. Bronchitis usually develops rapidly and lasts about 2 to 3 weeks in otherwise healthy people. Follow-up care is a key part of your treatment and safety. Be sure to make and go to all appointments, and call your doctor if you are having problems. It's also a good idea to know your test results and keep a list of the medicines you take. How can you care for yourself at home? · Take all medicines exactly as prescribed. Call your doctor if you think you are having a problem with your medicine. · Get some extra rest.  · Take an over-the-counter pain medicine, such as acetaminophen (Tylenol), ibuprofen (Advil, Motrin), or naproxen (Aleve) to reduce fever and relieve body aches. Read and follow all instructions on the label. · Do not take two or more pain medicines at the same time unless the doctor told you to. Many pain medicines have acetaminophen, which is Tylenol. Too much acetaminophen (Tylenol) can be harmful. · Take an over-the-counter cough medicine that contains dextromethorphan to help quiet a dry, hacking cough so that you can sleep. Avoid cough medicines that have more than one active ingredient. Read and follow all instructions on the label. · Breathe moist air from a humidifier, hot shower, or sink filled with hot water. The heat and moisture will thin mucus so you can cough it out. · Do not smoke.  Smoking can make bronchitis worse. If you need help quitting, talk to your doctor about stop-smoking programs and medicines. These can increase your chances of quitting for good. When should you call for help? Call 911 anytime you think you may need emergency care. For example, call if:    · You have severe trouble breathing.    Call your doctor now or seek immediate medical care if:    · You have new or worse trouble breathing.     · You cough up dark brown or bloody mucus (sputum).     · You have a new or higher fever.     · You have a new rash.    Watch closely for changes in your health, and be sure to contact your doctor if:    · You cough more deeply or more often, especially if you notice more mucus or a change in the color of your mucus.     · You are not getting better as expected. Where can you learn more? Go to https://InviteDEVpezacharyeweb.[email or web address] Design. org and sign in to your [employer] account. Enter H333 in the ENDOGENX box to learn more about \"Bronchitis: Care Instructions. \"     If you do not have an account, please click on the \"Sign Up Now\" link. Current as of: December 6, 2017  Content Version: 11.7  © 8775-8012 [employer]. Care instructions adapted under license by [facility] ([facility]). If you have questions about a medical condition or this instruction, always ask your healthcare professional. [address] any warranty or liability for your use of this information. Patient Education        Chronic Obstructive Pulmonary Disease (COPD): Care Instructions  Your Care Instructions    Chronic obstructive pulmonary disease (COPD) is a general term for a group of lung diseases, including emphysema and chronic bronchitis. People with COPD have decreased airflow in and out of the lungs, which makes it hard to breathe. The airways also can get clogged with thick mucus. Cigarette smoking is a major cause of COPD.   Although there is no cure for COPD, you can slow its need emergency care. For example, call if:    · You have severe trouble breathing.    Call your doctor now or seek immediate medical care if:    · You have new or worse trouble breathing.     · You cough up blood.     · You have a fever.    Watch closely for changes in your health, and be sure to contact your doctor if:    · You cough more deeply or more often, especially if you notice more mucus or a change in the color of your mucus.     · You have new or worse swelling in your legs or belly.     · You are not getting better as expected. Where can you learn more? Go to https://Today Tix.YouData. org and sign in to your [employer] Technologies account. Enter R694 in the Goodpatch box to learn more about \"Chronic Obstructive Pulmonary Disease (COPD): Care Instructions. \"     If you do not have an account, please click on the \"Sign Up Now\" link. Current as of: December 6, 2017  Content Version: 11.7  © 6061-8141 [employer]. Care instructions adapted under license by [facility] ([facility]). If you have questions about a medical condition or this instruction, always ask your healthcare professional. [place] [address] any warranty or liability for your use of this information.

## 2018-12-20 ENCOUNTER — OFFICE VISIT (OUTPATIENT)
Dept: FAMILY MEDICINE CLINIC | Age: 63
End: 2018-12-20
Payer: MEDICARE

## 2018-12-20 VITALS
BODY MASS INDEX: 34.53 KG/M2 | HEART RATE: 89 BPM | SYSTOLIC BLOOD PRESSURE: 138 MMHG | OXYGEN SATURATION: 96 % | HEIGHT: 67 IN | WEIGHT: 220 LBS | TEMPERATURE: 96.5 F | DIASTOLIC BLOOD PRESSURE: 68 MMHG

## 2018-12-20 DIAGNOSIS — R73.03 PRE-DIABETES: ICD-10-CM

## 2018-12-20 DIAGNOSIS — J44.1 CHRONIC OBSTRUCTIVE PULMONARY DISEASE WITH ACUTE EXACERBATION (HCC): Primary | ICD-10-CM

## 2018-12-20 DIAGNOSIS — Z13.220 SCREENING FOR LIPID DISORDERS: ICD-10-CM

## 2018-12-20 DIAGNOSIS — J40 BRONCHITIS: ICD-10-CM

## 2018-12-20 DIAGNOSIS — E03.9 HYPOTHYROIDISM, UNSPECIFIED TYPE: ICD-10-CM

## 2018-12-20 DIAGNOSIS — Z00.00 ROUTINE GENERAL MEDICAL EXAMINATION AT A HEALTH CARE FACILITY: ICD-10-CM

## 2018-12-20 PROCEDURE — 99213 OFFICE O/P EST LOW 20 MIN: CPT | Performed by: NURSE PRACTITIONER

## 2018-12-20 RX ORDER — BENZONATATE 200 MG/1
200 CAPSULE ORAL 3 TIMES DAILY PRN
Qty: 30 CAPSULE | Refills: 0 | Status: SHIPPED | OUTPATIENT
Start: 2018-12-20 | End: 2018-12-27

## 2018-12-20 RX ORDER — CEFUROXIME AXETIL 500 MG/1
500 TABLET ORAL 2 TIMES DAILY
Qty: 20 TABLET | Refills: 0 | Status: SHIPPED | OUTPATIENT
Start: 2018-12-20 | End: 2018-12-30

## 2018-12-20 ASSESSMENT — PATIENT HEALTH QUESTIONNAIRE - PHQ9
1. LITTLE INTEREST OR PLEASURE IN DOING THINGS: 0
SUM OF ALL RESPONSES TO PHQ QUESTIONS 1-9: 0
SUM OF ALL RESPONSES TO PHQ9 QUESTIONS 1 & 2: 0
2. FEELING DOWN, DEPRESSED OR HOPELESS: 0
SUM OF ALL RESPONSES TO PHQ QUESTIONS 1-9: 0

## 2018-12-20 ASSESSMENT — ENCOUNTER SYMPTOMS
WHEEZING: 0
TROUBLE SWALLOWING: 0
CONSTIPATION: 0
DIARRHEA: 0
VOICE CHANGE: 1
VOMITING: 0
SORE THROAT: 0
RHINORRHEA: 0
COUGH: 1
CHEST TIGHTNESS: 1
SINUS PRESSURE: 0
NAUSEA: 0
SHORTNESS OF BREATH: 1
ABDOMINAL PAIN: 0

## 2018-12-20 NOTE — PROGRESS NOTES
[address] [facility], [facility] [address]  [phone]      Shantanu Shine is a 66 y.o. female who presents today for her  medicalconditions/complaints as noted below. Shantanu Shine is c/o of Cough (started couple wks ago,worsening,losing voice,chest hurts,has tried Tylenol sinus with no relief. nonproductive)  . HPI:   Pt does not tolerate oral or inhaled steroids    Pt states she has been on levothyroxine for awhile since there was a recall on her med? Cough   This is a new problem. The current episode started 1 to 4 weeks ago. The problem has been gradually worsening. The problem occurs every few minutes. The cough is productive of sputum. Associated symptoms include nasal congestion, postnasal drip and shortness of breath. Pertinent negatives include no chest pain, chills, ear pain, fever, headaches, myalgias, rhinorrhea, sore throat, sweats, weight loss or wheezing. Associated symptoms comments: Voice loss  . The symptoms are aggravated by cold air, exercise and lying down. Risk factors for lung disease include smoking/tobacco exposure. She has tried rest, OTC cough suppressant, a beta-agonist inhaler, body position changes and cool air for the symptoms. The treatment provided no relief. Her past medical history is significant for asthma and COPD. There is no history of environmental allergies or pneumonia.        Past Medical History:   Diagnosis Date    Alzheimer disease     Alzheimer's disease     Asthma     Bipolar 2 disorder[facility] [address]     COPD (chronic obstructive pulmonary disease) [facility] [address]     Depression     Diabetes mellitus ([facility] [address]     Hyperlipidemia     Hypertension     Hypothyroidism 3/16/2018    Mild intermittent asthma without complication 6/35/3316    NASIR (obstructive sleep apnea)     Osteoporosis     Peripheral neuropathy     Schizophrenia (HCC)     Seizures (HCC)     pseudo seizures    Thyroid disease

## 2018-12-21 ENCOUNTER — HOSPITAL ENCOUNTER (OUTPATIENT)
Age: 63
Setting detail: SPECIMEN
Discharge: HOME | End: 2018-12-21
Payer: MEDICARE

## 2018-12-21 DIAGNOSIS — Z00.00 ROUTINE GENERAL MEDICAL EXAMINATION AT A HEALTH CARE FACILITY: ICD-10-CM

## 2018-12-21 DIAGNOSIS — R73.03 PRE-DIABETES: ICD-10-CM

## 2018-12-21 DIAGNOSIS — E03.9 HYPOTHYROIDISM, UNSPECIFIED TYPE: ICD-10-CM

## 2018-12-21 DIAGNOSIS — Z13.220 SCREENING FOR LIPID DISORDERS: ICD-10-CM

## 2018-12-21 LAB
ALBUMIN SERPL-MCNC: 4.4 G/DL (ref 3.5–5.2)
ALBUMIN/GLOBULIN RATIO: 1.5 (ref 1–2.5)
ALP BLD-CCNC: 61 U/L (ref 35–104)
ALT SERPL-CCNC: 10 U/L (ref 5–33)
ANION GAP SERPL CALCULATED.3IONS-SCNC: 13 MMOL/L (ref 9–17)
AST SERPL-CCNC: 10 U/L
BILIRUB SERPL-MCNC: 0.42 MG/DL (ref 0.3–1.2)
BUN BLDV-MCNC: 10 MG/DL (ref 8–23)
BUN/CREAT BLD: ABNORMAL (ref 9–20)
CALCIUM SERPL-MCNC: 9.6 MG/DL (ref 8.6–10.4)
CHLORIDE BLD-SCNC: 97 MMOL/L (ref 98–107)
CHOLESTEROL, FASTING: 243 MG/DL
CHOLESTEROL/HDL RATIO: 6.1
CO2: 27 MMOL/L (ref 20–31)
CREAT SERPL-MCNC: 0.88 MG/DL (ref 0.5–0.9)
ESTIMATED AVERAGE GLUCOSE: 120 MG/DL
GFR AFRICAN AMERICAN: >60 ML/MIN
GFR NON-AFRICAN AMERICAN: >60 ML/MIN
GFR SERPL CREATININE-BSD FRML MDRD: ABNORMAL ML/MIN/{1.73_M2}
GFR SERPL CREATININE-BSD FRML MDRD: ABNORMAL ML/MIN/{1.73_M2}
GLUCOSE BLD-MCNC: 100 MG/DL (ref 70–99)
HBA1C MFR BLD: 5.8 % (ref 4–6)
HCT VFR BLD CALC: 45.7 % (ref 36.3–47.1)
HDLC SERPL-MCNC: 40 MG/DL
HEMOGLOBIN: 15 G/DL (ref 11.9–15.1)
LDL CHOLESTEROL: 156 MG/DL (ref 0–130)
MCH RBC QN AUTO: 30.7 PG (ref 25.2–33.5)
MCHC RBC AUTO-ENTMCNC: 32.8 G/DL (ref 28.4–34.8)
MCV RBC AUTO: 93.6 FL (ref 82.6–102.9)
NRBC AUTOMATED: 0 PER 100 WBC
PDW BLD-RTO: 13.1 % (ref 11.8–14.4)
PLATELET # BLD: 258 K/UL (ref 138–453)
PMV BLD AUTO: 10 FL (ref 8.1–13.5)
POTASSIUM SERPL-SCNC: 4.6 MMOL/L (ref 3.7–5.3)
RBC # BLD: 4.88 M/UL (ref 3.95–5.11)
SODIUM BLD-SCNC: 137 MMOL/L (ref 135–144)
T4 TOTAL: 3.3 UG/DL (ref 4.5–12)
TOTAL PROTEIN: 7.3 G/DL (ref 6.4–8.3)
TRIGLYCERIDE, FASTING: 235 MG/DL
TSH SERPL DL<=0.05 MIU/L-ACNC: 112.35 MIU/L (ref 0.3–5)
VLDLC SERPL CALC-MCNC: ABNORMAL MG/DL (ref 1–30)
WBC # BLD: 9.6 K/UL (ref 3.5–11.3)

## 2018-12-24 DIAGNOSIS — E78.2 MIXED HYPERLIPIDEMIA: ICD-10-CM

## 2018-12-24 DIAGNOSIS — E03.9 HYPOTHYROIDISM, UNSPECIFIED TYPE: Primary | ICD-10-CM

## 2018-12-24 RX ORDER — LEVOTHYROXINE SODIUM 0.15 MG/1
TABLET ORAL
Qty: 30 TABLET | Refills: 1 | Status: SHIPPED | OUTPATIENT
Start: 2018-12-24 | End: 2019-02-28 | Stop reason: SDUPTHER

## 2018-12-24 RX ORDER — LOVASTATIN 40 MG/1
TABLET ORAL
Qty: 30 TABLET | Refills: 5 | Status: SHIPPED | OUTPATIENT
Start: 2018-12-24 | End: 2019-03-28 | Stop reason: SDUPTHER

## 2019-02-28 DIAGNOSIS — E03.9 HYPOTHYROIDISM, UNSPECIFIED TYPE: ICD-10-CM

## 2019-02-28 RX ORDER — LEVOTHYROXINE SODIUM 0.15 MG/1
TABLET ORAL
Qty: 30 TABLET | Refills: 0 | Status: SHIPPED | OUTPATIENT
Start: 2019-02-28 | End: 2019-03-28 | Stop reason: SDUPTHER

## 2019-03-19 ENCOUNTER — OFFICE VISIT (OUTPATIENT)
Dept: FAMILY MEDICINE CLINIC | Age: 64
End: 2019-03-19
Payer: MEDICARE

## 2019-03-19 VITALS
WEIGHT: 217 LBS | HEIGHT: 67 IN | DIASTOLIC BLOOD PRESSURE: 67 MMHG | HEART RATE: 93 BPM | TEMPERATURE: 96.6 F | OXYGEN SATURATION: 96 % | SYSTOLIC BLOOD PRESSURE: 116 MMHG | BODY MASS INDEX: 34.06 KG/M2

## 2019-03-19 DIAGNOSIS — E78.2 MIXED HYPERLIPIDEMIA: ICD-10-CM

## 2019-03-19 DIAGNOSIS — F03.90: Primary | ICD-10-CM

## 2019-03-19 PROCEDURE — 99213 OFFICE O/P EST LOW 20 MIN: CPT | Performed by: NURSE PRACTITIONER

## 2019-03-19 ASSESSMENT — PATIENT HEALTH QUESTIONNAIRE - PHQ9
SUM OF ALL RESPONSES TO PHQ QUESTIONS 1-9: 0
1. LITTLE INTEREST OR PLEASURE IN DOING THINGS: 0
SUM OF ALL RESPONSES TO PHQ QUESTIONS 1-9: 0
2. FEELING DOWN, DEPRESSED OR HOPELESS: 0
SUM OF ALL RESPONSES TO PHQ9 QUESTIONS 1 & 2: 0

## 2019-03-19 ASSESSMENT — ENCOUNTER SYMPTOMS
NAUSEA: 0
ABDOMINAL PAIN: 0
VOMITING: 0
CHEST TIGHTNESS: 0
SHORTNESS OF BREATH: 0
COUGH: 0

## 2019-03-25 ENCOUNTER — HOSPITAL ENCOUNTER (OUTPATIENT)
Age: 64
Setting detail: SPECIMEN
Discharge: HOME | End: 2019-03-25
Payer: MEDICARE

## 2019-03-25 DIAGNOSIS — E03.9 HYPOTHYROIDISM, UNSPECIFIED TYPE: ICD-10-CM

## 2019-03-25 DIAGNOSIS — E78.2 MIXED HYPERLIPIDEMIA: ICD-10-CM

## 2019-03-25 LAB
CHOLESTEROL/HDL RATIO: 4.3
CHOLESTEROL: 149 MG/DL
HDLC SERPL-MCNC: 35 MG/DL
LDL CHOLESTEROL: 80 MG/DL (ref 0–130)
THYROXINE, FREE: 2.29 NG/DL (ref 0.93–1.7)
TRIGL SERPL-MCNC: 170 MG/DL
TSH SERPL DL<=0.05 MIU/L-ACNC: 0.4 MIU/L (ref 0.3–5)
VLDLC SERPL CALC-MCNC: ABNORMAL MG/DL (ref 1–30)

## 2019-03-28 DIAGNOSIS — E78.2 MIXED HYPERLIPIDEMIA: ICD-10-CM

## 2019-03-28 DIAGNOSIS — E03.9 HYPOTHYROIDISM, UNSPECIFIED TYPE: ICD-10-CM

## 2019-03-28 LAB — LIPOPROTEIN (A): 8 MG/DL

## 2019-03-28 RX ORDER — LOVASTATIN 40 MG/1
TABLET ORAL
Qty: 30 TABLET | Refills: 11 | Status: SHIPPED | OUTPATIENT
Start: 2019-03-28 | End: 2020-04-13

## 2019-03-28 RX ORDER — LEVOTHYROXINE SODIUM 0.15 MG/1
TABLET ORAL
Qty: 30 TABLET | Refills: 11 | Status: SHIPPED | OUTPATIENT
Start: 2019-03-28 | End: 2020-04-06

## 2019-05-17 ENCOUNTER — OFFICE VISIT (OUTPATIENT)
Dept: FAMILY MEDICINE CLINIC | Age: 64
End: 2019-05-17
Payer: MEDICARE

## 2019-05-17 VITALS
BODY MASS INDEX: 35 KG/M2 | HEIGHT: 67 IN | OXYGEN SATURATION: 96 % | HEART RATE: 105 BPM | TEMPERATURE: 97.1 F | DIASTOLIC BLOOD PRESSURE: 75 MMHG | SYSTOLIC BLOOD PRESSURE: 122 MMHG | WEIGHT: 223 LBS

## 2019-05-17 DIAGNOSIS — R35.0 FREQUENT URINATION: Primary | ICD-10-CM

## 2019-05-17 DIAGNOSIS — Z23 NEED FOR PROPHYLACTIC VACCINATION AND INOCULATION AGAINST VARICELLA: ICD-10-CM

## 2019-05-17 DIAGNOSIS — R32 URINARY INCONTINENCE, UNSPECIFIED TYPE: ICD-10-CM

## 2019-05-17 LAB
BILIRUBIN, POC: NEGATIVE
BLOOD URINE, POC: NEGATIVE
CLARITY, POC: CLEAR
COLOR, POC: YELLOW
GLUCOSE URINE, POC: NEGATIVE
GLUCOSE, WHOLE BLOOD: 138
KETONES, POC: NEGATIVE
LEUKOCYTE EST, POC: NEGATIVE
NITRITE, POC: NEGATIVE
PH, POC: 7
PROTEIN, POC: NEGATIVE
SPECIFIC GRAVITY, POC: 1.01
UROBILINOGEN, POC: 0.2

## 2019-05-17 PROCEDURE — 99213 OFFICE O/P EST LOW 20 MIN: CPT | Performed by: NURSE PRACTITIONER

## 2019-05-17 PROCEDURE — 81002 URINALYSIS NONAUTO W/O SCOPE: CPT | Performed by: NURSE PRACTITIONER

## 2019-05-17 PROCEDURE — 82947 ASSAY GLUCOSE BLOOD QUANT: CPT | Performed by: NURSE PRACTITIONER

## 2019-05-17 ASSESSMENT — ENCOUNTER SYMPTOMS
DIARRHEA: 0
NAUSEA: 0
ABDOMINAL PAIN: 0
COUGH: 0
CONSTIPATION: 0
BACK PAIN: 0
VOMITING: 0

## 2019-05-17 NOTE — PROGRESS NOTES
[address] [facility], [address]  [phone]      Ulysses Buckle is a 66 y.o. female who presents today for her  medicalconditions/complaints as noted below. Ulysses Buckle is c/o of Urinary Frequency (voiding every 2 hrs,not drinking any more than usual,no dysuria)  . HPI:    Pt sees psych and was recently increased on her risperidone about 1 month ago. She has stressed to him that she has bad nightmares on this and prev. Urinary issues but he is reluctant to take her off of this med. Urinary Frequency    This is a new problem. Episode onset: last 3 weeks. The problem occurs every urination. The problem has been waxing and waning. The patient is experiencing no pain. There has been no fever. She is not sexually active. There is no history of pyelonephritis. Associated symptoms include frequency and urgency. Pertinent negatives include no chills, discharge, flank pain, hematuria, hesitancy, nausea or vomiting. Associated symptoms comments: Incontinence with leaking if she does not go right away  . She has tried increased fluids and home medications for the symptoms. The treatment provided no relief. There is no history of catheterization, kidney stones, recurrent UTIs or a single kidney.        Past Medical History:   Diagnosis Date    Alzheimer disease     Alzheimer's disease     Asthma     [facility] 2 disorder, [facility] depressive episode ([facility] [address]     COPD (chronic obstructive pulmonary disease) ([facility] [address]     Depression     Diabetes mellitus ([facility] [address]     Hyperlipidemia     Hypertension     Hypothyroidism 3/16/2018    Mild intermittent asthma without complication 5/37/4526    NASIR (obstructive sleep apnea)     Osteoporosis     Peripheral neuropathy     Schizophrenia (HCC)     Seizures (HCC)     pseudo seizures    Thyroid disease     hypothyroidism      Past Surgical History:   Procedure Laterality Date    APPENDECTOMY      CHOLECYSTECTOMY      FRACTURE SURGERY      left ankle    HYSTERECTOMY      polyps    HYSTERECTOMY, TOTAL ABDOMINAL      INSERTABLE CARDIAC MONITOR Left 01/10/2018     Family History   Problem Relation Age of Onset    Cancer Mother         skin    Heart Disease Mother     Thyroid Disease Mother     Stroke Mother     Heart Disease Father     High Blood Pressure Father     Stroke Father     Other Father         alzhemier's      Social History     Tobacco Use    Smoking status: Current Every Day Smoker     Packs/day: 0.50     Years: 50.00     Pack years: 25.00     Types: Cigarettes    Smokeless tobacco: Never Used   Substance Use Topics    Alcohol use: No     Alcohol/week: 0.0 oz      Current Outpatient Medications   Medication Sig Dispense Refill    zoster recombinant adjuvanted vaccine (SHINGRIX) 50 MCG/0.5ML SUSR injection Inject 0.5 mLs into the muscle once for 1 dose 50 MCG IM then repeat 2-6 months. 1 each 1    levothyroxine (SYNTHROID) 150 MCG tablet TAKE ONE TABLET BY MOUTH DAILY 30 tablet 11    lovastatin (MEVACOR) 40 MG tablet TAKE ONE TABLET BY MOUTH ONCE NIGHTLY 30 tablet 11    ranitidine (ZANTAC) 300 MG tablet Take 1 tablet by mouth daily      divalproex (DEPAKOTE ER) 250 MG extended release tablet       metoclopramide (REGLAN) 5 MG/5ML solution       albuterol (PROVENTIL) (2.5 MG/3ML) 0.083% nebulizer solution Take 3 mLs by nebulization every 6 hours as needed for Wheezing 120 each 0    risperiDONE (RISPERDAL) 2 MG tablet Take 1 tablet by mouth 3 times daily       primidone (MYSOLINE) 50 MG tablet Take 50 mg by mouth 2 times daily as needed       lansoprazole (PREVACID) 30 MG capsule Take 30 mg by mouth daily       traZODone (DESYREL) 150 MG tablet Take 150 mg by mouth 2 times daily 2 tablets orally per day       No current facility-administered medications for this visit.       Allergies   Allergen Reactions    Latex Rash    Biaxin [Clarithromycin] Hives    Dicloxacillin Hives    Pcn [Penicillins] Hives    Zithromax [Azithromycin] Hives    Amoxicillin     Exelon [Rivastigmine Tartrate]     Lithium     Lyrica [Pregabalin]     Magnesium Trisilicate     Magnesium-Containing Compounds     Nystatin     Prednisone     Rivastigmine Tartrate     Seroquel [Quetiapine Fumarate] Other (See Comments)     Excess weight gain     Symbicort [Budesonide-Formoterol Fumarate] Other (See Comments)     Chest pain    Aricept [Donepezil Hydrochloride] Nausea And Vomiting    Donepezil Hcl Nausea Only    Morphine Other (See Comments)     headaches       Health Maintenance   Topic Date Due    DTaP/Tdap/Td vaccine (1 - Tdap) 11/19/1976    Shingles Vaccine (1 of 2) 11/19/2007    Breast cancer screen  08/18/2019    Flu vaccine (Season Ended) 09/01/2019    A1C test (Diabetic or Prediabetic)  12/21/2019    TSH testing  03/25/2020    Colon cancer screen colonoscopy  09/02/2020    Lipid screen  03/25/2024    Pneumococcal 0-64 years Vaccine  Completed    Hepatitis C screen  Completed    HIV screen  Completed       Subjective:      Review of Systems   Constitutional: Positive for activity change. Negative for chills, fatigue and fever. Respiratory: Negative for cough. Cardiovascular: Negative for chest pain. Gastrointestinal: Negative for abdominal pain, constipation, diarrhea, nausea and vomiting. Genitourinary: Positive for enuresis, frequency and urgency. Negative for decreased urine volume, difficulty urinating, dysuria, flank pain, hematuria, hesitancy, pelvic pain, vaginal discharge and vaginal pain. Musculoskeletal: Negative for back pain. Allergic/Immunologic: Negative for immunocompromised state. Neurological: Negative for dizziness, light-headedness and headaches. Psychiatric/Behavioral: Positive for sleep disturbance. Objective:      Physical Exam   Constitutional: She is oriented to person, place, and time. She appears well-developed and well-nourished. No distress.    HENT:   Head: Normocephalic and atraumatic. Neck: Neck supple. Pulmonary/Chest: Effort normal.   Abdominal: Soft. There is no tenderness. There is no CVA tenderness. Neurological: She is alert and oriented to person, place, and time. Skin: Skin is warm and dry. She is not diaphoretic. Psychiatric: She has a normal mood and affect. Her behavior is normal. Judgment and thought content normal.   Nursing note and vitals reviewed. Assessment:       Diagnosis Orders   1. Frequent urination  POCT Urinalysis no Micro    POC Glucose, Whole Blood   2. Urinary incontinence, unspecified type     3. Need for prophylactic vaccination and inoculation against varicella  zoster recombinant adjuvanted vaccine [facility]) 50 MCG/0.5ML SUSR injection     Results for orders placed or performed in visit on 05/17/19   POCT Urinalysis no Micro   Result Value Ref Range    Color, UA yellow     Clarity, UA clear     Glucose, UA POC negative     Bilirubin, UA negative     Ketones, UA negative     Spec Grav, UA 1.015     Blood, UA POC negative     pH, UA 7.0     Protein, UA POC negative     Urobilinogen, UA 0.2     Leukocytes, UA negative     Nitrite, UA negative    POC Glucose, Whole Blood   Result Value Ref Range    Glucose, Whole Blood 138      Pt recently ate a bag of cheetos 1 hour ago, she has BG checked at [facility] last week and BS was 102  Plan:      No follow-ups on file. Orders Placed This Encounter   Procedures    POCT Urinalysis no Micro    POC Glucose, Whole Blood     Orders Placed This Encounter   Medications    zoster recombinant adjuvanted vaccine (SHINGRIX) 50 MCG/0.5ML SUSR injection     Sig: Inject 0.5 mLs into the muscle once for 1 dose 50 MCG IM then repeat 2-6 months. Dispense:  1 each     Refill:  1   pt advised to call psych for discussion about med change as he urinary symptoms have only worsened and does not like risperidone in general, advised also to maybe seek new psych opinion.    No signs of infection noted today and BS appropriate for recent eating  Hard rx given for shingles vaccines    Patient given educational materials - see patient instructions. Discussed use,benefit, and side effects of prescribed medications. All patient questions answered. Pt voiced understanding. Reviewed health maintenance. Instructed to continue currentmedications, diet and exercise.     Electronically signed by Matt Robertson CNP on 5/17/2019 at 1:47 PM

## 2019-05-28 ENCOUNTER — TELEPHONE (OUTPATIENT)
Dept: FAMILY MEDICINE CLINIC | Age: 64
End: 2019-05-28

## 2019-05-28 NOTE — TELEPHONE ENCOUNTER
I called patient and she states she is now peeing every 30 min to an hour. I asked his she contacted her psych doctor and she states she did right away and he said to discontinue it. She has been off it for a week. I asked if he had any decisions and she said no. Please advise.  Thank you

## 2019-05-28 NOTE — TELEPHONE ENCOUNTER
Patient called stating she was seen on 5/17 for frequent urination. She is now going every hour. No pain or burning. Please advise.  Thank you

## 2019-05-29 ENCOUNTER — OFFICE VISIT (OUTPATIENT)
Dept: FAMILY MEDICINE CLINIC | Age: 64
End: 2019-05-29
Payer: MEDICARE

## 2019-05-29 VITALS
HEART RATE: 78 BPM | OXYGEN SATURATION: 99 % | DIASTOLIC BLOOD PRESSURE: 78 MMHG | TEMPERATURE: 98.4 F | SYSTOLIC BLOOD PRESSURE: 110 MMHG

## 2019-05-29 DIAGNOSIS — R39.9 UTI SYMPTOMS: Primary | ICD-10-CM

## 2019-05-29 LAB
BILIRUBIN, POC: NORMAL
BLOOD URINE, POC: NORMAL
CLARITY, POC: CLEAR
COLOR, POC: YELLOW
GLUCOSE URINE, POC: NORMAL
KETONES, POC: NORMAL
LEUKOCYTE EST, POC: NORMAL
NITRITE, POC: NORMAL
PH, POC: 6
PROTEIN, POC: NORMAL
SPECIFIC GRAVITY, POC: 1.01
UROBILINOGEN, POC: 0.2

## 2019-05-29 PROCEDURE — 81002 URINALYSIS NONAUTO W/O SCOPE: CPT | Performed by: NURSE PRACTITIONER

## 2019-05-29 PROCEDURE — 99213 OFFICE O/P EST LOW 20 MIN: CPT | Performed by: NURSE PRACTITIONER

## 2019-05-29 RX ORDER — SULFAMETHOXAZOLE AND TRIMETHOPRIM 800; 160 MG/1; MG/1
1 TABLET ORAL 2 TIMES DAILY
Qty: 14 TABLET | Refills: 0 | Status: SHIPPED | OUTPATIENT
Start: 2019-05-29 | End: 2019-11-12 | Stop reason: SDUPTHER

## 2019-05-29 RX ORDER — PHENAZOPYRIDINE HYDROCHLORIDE 200 MG/1
200 TABLET, FILM COATED ORAL 3 TIMES DAILY PRN
Qty: 9 TABLET | Refills: 0 | Status: SHIPPED | OUTPATIENT
Start: 2019-05-29 | End: 2019-06-01

## 2019-05-29 ASSESSMENT — ENCOUNTER SYMPTOMS
WHEEZING: 0
ABDOMINAL PAIN: 0
RESPIRATORY NEGATIVE: 1
CONSTIPATION: 0
COUGH: 0
BLOOD IN STOOL: 0
NAUSEA: 0
SHORTNESS OF BREATH: 0
DIARRHEA: 0
VOMITING: 0
CHEST TIGHTNESS: 0

## 2019-05-29 NOTE — PATIENT INSTRUCTIONS
Patient Education        Urinary Tract Infection in Women: Care Instructions  Your Care Instructions    A urinary tract infection, or UTI, is a general term for an infection anywhere between the kidneys and the urethra (where urine comes out). Most UTIs are bladder infections. They often cause pain or burning when you urinate. UTIs are caused by bacteria and can be cured with antibiotics. Be sure to complete your treatment so that the infection goes away. Follow-up care is a key part of your treatment and safety. Be sure to make and go to all appointments, and call your doctor if you are having problems. It's also a good idea to know your test results and keep a list of the medicines you take. How can you care for yourself at home? · Take your antibiotics as directed. Do not stop taking them just because you feel better. You need to take the full course of antibiotics. · Drink extra water and other fluids for the next day or two. This may help wash out the bacteria that are causing the infection. (If you have kidney, heart, or liver disease and have to limit fluids, talk with your doctor before you increase your fluid intake.)  · Avoid drinks that are carbonated or have caffeine. They can irritate the bladder. · Urinate often. Try to empty your bladder each time. · To relieve pain, take a hot bath or lay a heating pad set on low over your lower belly or genital area. Never go to sleep with a heating pad in place. To prevent UTIs  · Drink plenty of water each day. This helps you urinate often, which clears bacteria from your system. (If you have kidney, heart, or liver disease and have to limit fluids, talk with your doctor before you increase your fluid intake.)  · Urinate when you need to. · Urinate right after you have sex. · Change sanitary pads often. · Avoid douches, bubble baths, feminine hygiene sprays, and other feminine hygiene products that have deodorants.   · After going to the bathroom, wipe from front to back. When should you call for help? Call your doctor now or seek immediate medical care if:    · Symptoms such as fever, chills, nausea, or vomiting get worse or appear for the first time.     · You have new pain in your back just below your rib cage. This is called flank pain.     · There is new blood or pus in your urine.     · You have any problems with your antibiotic medicine.    Watch closely for changes in your health, and be sure to contact your doctor if:    · You are not getting better after taking an antibiotic for 2 days.     · Your symptoms go away but then come back. Where can you learn more? Go to https://ThinkrpeHelidyneeb.Swipesense. org and sign in to your Mahalo account. Enter K100 in the [employer] box to learn more about \"Urinary Tract Infection in Women: Care Instructions. \"     If you do not have an account, please click on the \"Sign Up Now\" link. Current as of: December 19, 2018  Content Version: 12.0  © 9019-2807 Healthwise, Incorporated. Care instructions adapted under license by [facility] ([facility]). If you have questions about a medical condition or this instruction, always ask your healthcare professional. [place] [address] any warranty or liability for your use of this information.

## 2019-05-29 NOTE — PROGRESS NOTES
[address] [facility] [address]  Dept: [phone]  Dept Fax: [phone]     Melody Fuentes is a 66 y.o. female who presents to the urgent care today for her medicalconditions/complaints as noted below. Melody Fuentes is c/o of Urinary Tract Infection (frequency, dizziness, lightheaded - started at the beginning of the month)    HPI:      Urinary Tract Infection    This is a new problem. Episode onset: about a month ago. The problem occurs every urination. The problem has been unchanged. The patient is experiencing no pain. There has been no fever. Associated symptoms include frequency and urgency. Pertinent negatives include no chills, discharge, flank pain, hematuria, hesitancy, nausea, sweats or vomiting. Treatments tried: tried stopping risperdal. The treatment provided no relief. There is no history of recurrent UTIs or a urological procedure. Was seen in the office on 5/17/19 for these symptoms and UA was negative. It was recommended that she stop her Risperdal due to the fact that the symptoms she is experiencing could be a side effect of this medication and she had recently had an increase in her dosing. She states that since she stopped taking the medication she has contacted her psychiatrist who was agreeable, but her symptoms have not improved at all.     Past Medical History:   Diagnosis Date    Alzheimer disease     Alzheimer's disease     Asthma     Bipolar 2 disorder[facility] depressive [facility]     COPD [facility]chronic obstructive pulmonary disease) [facility]     Depression     Diabetes mellitus ([facility]     Hyperlipidemia     Hypertension     Hypothyroidism 3/16/2018    Mild intermittent asthma without complication 0/18/3164    NASIR (obstructive sleep apnea)     Osteoporosis     Peripheral neuropathy     Schizophrenia (HCC)     Seizures (HCC)     pseudo seizures    Thyroid disease hypothyroidism      Current Outpatient Medications   Medication Sig Dispense Refill    sulfamethoxazole-trimethoprim (BACTRIM DS) 800-160 MG per tablet Take 1 tablet by mouth 2 times daily for 7 days 14 tablet 0    phenazopyridine (PYRIDIUM) 200 MG tablet Take 1 tablet by mouth 3 times daily as needed for Pain 9 tablet 0    levothyroxine (SYNTHROID) 150 MCG tablet TAKE ONE TABLET BY MOUTH DAILY 30 tablet 11    lovastatin (MEVACOR) 40 MG tablet TAKE ONE TABLET BY MOUTH ONCE NIGHTLY 30 tablet 11    ranitidine (ZANTAC) 300 MG tablet Take 1 tablet by mouth daily      divalproex (DEPAKOTE ER) 250 MG extended release tablet       metoclopramide (REGLAN) 5 MG/5ML solution       albuterol (PROVENTIL) (2.5 MG/3ML) 0.083% nebulizer solution Take 3 mLs by nebulization every 6 hours as needed for Wheezing 120 each 0    primidone (MYSOLINE) 50 MG tablet Take 50 mg by mouth 2 times daily as needed       lansoprazole (PREVACID) 30 MG capsule Take 30 mg by mouth daily       traZODone (DESYREL) 150 MG tablet Take 150 mg by mouth 2 times daily 2 tablets orally per day       No current facility-administered medications for this visit. Allergies   Allergen Reactions    Latex Rash    Biaxin [Clarithromycin] Hives    Dicloxacillin Hives    Pcn [Penicillins] Hives    Zithromax [Azithromycin] Hives    Amoxicillin     Exelon [Rivastigmine Tartrate]     Lithium     Lyrica [Pregabalin]     Magnesium Trisilicate     Magnesium-Containing Compounds     Nystatin     Prednisone     Rivastigmine Tartrate     Seroquel [Quetiapine Fumarate] Other (See Comments)     Excess weight gain     Symbicort [Budesonide-Formoterol Fumarate] Other (See Comments)     Chest pain    Aricept [Donepezil Hydrochloride] Nausea And Vomiting    Donepezil Hcl Nausea Only    Morphine Other (See Comments)     headaches     Reviewed PMH, SH, and FH with the patient and updated.     Subjective:      Review of Systems   Constitutional: Negative for chills, fatigue and fever. HENT: Negative. Respiratory: Negative. Negative for cough, chest tightness, shortness of breath and wheezing. Cardiovascular: Negative. Negative for chest pain. Gastrointestinal: Negative for abdominal pain, blood in stool, constipation, diarrhea, nausea and vomiting. Genitourinary: Positive for frequency, pelvic pain (intermittently, mild) and urgency. Negative for decreased urine volume, difficulty urinating, dysuria, flank pain, hematuria, hesitancy, vaginal bleeding and vaginal discharge. Skin: Negative for rash. Neurological: Positive for dizziness and light-headedness. All other systems reviewed and are negative. Objective:      Physical Exam   Constitutional: She appears well-developed. No distress. HENT:   Head: Normocephalic and atraumatic. Cardiovascular: Normal rate, regular rhythm and normal heart sounds. No murmur heard. Pulmonary/Chest: Effort normal and breath sounds normal. No respiratory distress. She has no wheezes. Abdominal: Soft. Bowel sounds are normal. She exhibits no distension. There is tenderness (mild) in the right lower quadrant, suprapubic area and left lower quadrant. There is no CVA tenderness. Neurological: She is alert. Skin: Skin is warm and dry. She is not diaphoretic. Nursing note and vitals reviewed. /78 (Site: Right Upper Arm, Position: Sitting, Cuff Size: Medium Adult)   Pulse 78   Temp 98.4 °F (36.9 °C) (Oral)   SpO2 99%     Results for orders placed or performed in visit on 05/29/19   POCT Urinalysis no Micro   Result Value Ref Range    Color, UA yellow     Clarity, UA clear     Glucose, UA POC neg     Bilirubin, UA neg     Ketones, UA neg     Spec Grav, UA 1.010     Blood, UA POC neg     pH, UA 6.0     Protein, UA POC neg     Urobilinogen, UA 0.2     Leukocytes, UA neg     Nitrite, UA neg      Assessment:       Diagnosis Orders   1.  UTI symptoms  POCT Urinalysis no Micro sulfamethoxazole-trimethoprim (BACTRIM DS) 800-160 MG per tablet    Urine Culture    phenazopyridine (PYRIDIUM) 200 MG tablet     Plan:      Despite the negative UA in the office, will treat for UTI (due to her symptoms) and send urine out for a culture. Possible treatment alterations based on her results. Bactrim BID x 7 days sent to the pharmacy. Patient instructed to complete entire antibiotic course. Pyridium TID prn x 3 days for the urinary symptoms. Increase fluid intake. Educational materials regarding UTI given on AVS.  Patient agreeable to treatment plan. Follow up or call the office if symptoms do not improve/worsen. Would likely refer to urology at that time. Orders Placed This Encounter   Medications    sulfamethoxazole-trimethoprim (BACTRIM DS) 800-160 MG per tablet     Sig: Take 1 tablet by mouth 2 times daily for 7 days     Dispense:  14 tablet     Refill:  0    phenazopyridine (PYRIDIUM) 200 MG tablet     Sig: Take 1 tablet by mouth 3 times daily as needed for Pain     Dispense:  9 tablet     Refill:  0        Patient given educational materials - see patient instructions. Discussed use, benefit, and side effects of prescribed medications. All patientquestions answered. Pt voiced understanding.     Electronically signed by RASHI Espinosa CNP on 5/29/2019at 11:54 AM

## 2019-06-05 ENCOUNTER — OFFICE VISIT (OUTPATIENT)
Dept: FAMILY MEDICINE CLINIC | Age: 64
End: 2019-06-05
Payer: MEDICARE

## 2019-06-05 ENCOUNTER — HOSPITAL ENCOUNTER (OUTPATIENT)
Age: 64
Setting detail: SPECIMEN
Discharge: HOME | End: 2019-06-05
Payer: MEDICARE

## 2019-06-05 VITALS
HEART RATE: 88 BPM | SYSTOLIC BLOOD PRESSURE: 120 MMHG | DIASTOLIC BLOOD PRESSURE: 70 MMHG | RESPIRATION RATE: 18 BRPM | OXYGEN SATURATION: 97 % | BODY MASS INDEX: 34.21 KG/M2 | HEIGHT: 67 IN | WEIGHT: 218 LBS | TEMPERATURE: 98.2 F

## 2019-06-05 DIAGNOSIS — N39.41 URGE INCONTINENCE: Primary | ICD-10-CM

## 2019-06-05 DIAGNOSIS — R35.0 URINARY FREQUENCY: ICD-10-CM

## 2019-06-05 LAB
BILIRUBIN, POC: ABNORMAL
BLOOD URINE, POC: ABNORMAL
CLARITY, POC: CLEAR
COLOR, POC: ABNORMAL
GLUCOSE URINE, POC: ABNORMAL
KETONES, POC: ABNORMAL
LEUKOCYTE EST, POC: ABNORMAL
NITRITE, POC: ABNORMAL
PH, POC: 6
PROTEIN, POC: ABNORMAL
SPECIFIC GRAVITY, POC: 1.02
UROBILINOGEN, POC: 1

## 2019-06-05 PROCEDURE — 81003 URINALYSIS AUTO W/O SCOPE: CPT | Performed by: NURSE PRACTITIONER

## 2019-06-05 PROCEDURE — 81002 URINALYSIS NONAUTO W/O SCOPE: CPT | Performed by: NURSE PRACTITIONER

## 2019-06-05 PROCEDURE — 99213 OFFICE O/P EST LOW 20 MIN: CPT | Performed by: NURSE PRACTITIONER

## 2019-06-05 RX ORDER — OXYBUTYNIN CHLORIDE 5 MG/1
5 TABLET, EXTENDED RELEASE ORAL DAILY
Qty: 30 TABLET | Refills: 0 | Status: SHIPPED | OUTPATIENT
Start: 2019-06-05 | End: 2019-07-02 | Stop reason: SDUPTHER

## 2019-06-05 ASSESSMENT — ENCOUNTER SYMPTOMS
WHEEZING: 0
SHORTNESS OF BREATH: 0
CHEST TIGHTNESS: 0
COUGH: 0
RESPIRATORY NEGATIVE: 1
NAUSEA: 0
VOMITING: 0

## 2019-06-05 NOTE — PROGRESS NOTES
[address] [facility] [place] [address]  Dept: [phone]  Dept Fax: [phone]     Frances Beckett is a 66 y.o. female who presents to the urgent care today for her medicalconditions/complaints as noted below. Frances Beckett is c/o of Urinary Frequency (started last month, has been in a few times for this. cannot sleep at night because she's up using the bathroom. no other symptoms pt states. states bladder does not get full. )    HPI:      Urinary Frequency    This is a new problem. Episode onset: 1 month. The problem occurs every urination. The problem has been unchanged. The pain is at a severity of 0/10. The patient is experiencing no pain. There has been no fever. Associated symptoms include frequency and urgency. Pertinent negatives include no chills, discharge, flank pain, hematuria, hesitancy, nausea or vomiting. Associated symptoms comments: C/o incontinence at times. Treatments tried: Bactrim DS. The treatment provided no relief. Was seen in the office on 5/17/19 and then again 5/29/19 for these symptoms and UA was negative. It was recommended that she stop her Risperdal on 5/17/19 due to the fact that the symptoms she is experiencing could be a side effect of this medication and she had recently had an increase in her dosing. She states that since she stopped taking the medication she has contacted her psychiatrist who was agreeable, but her symptoms have not improved at all. She was also placed on Bactrim DS on 5/29/19 for a possible underlying infection despite the negative UA. States that this did not improve her symptoms as well.     Past Medical History:   Diagnosis Date    Alzheimer disease     Alzheimer's disease     Asthma     [facility] 2 disorder, major [facility]     COPD (chronic obstructive pulmonary disease) (HCC)     Depression     Diabetes mellitus ([facility]     Hyperlipidemia     Hypertension     Hypothyroidism 3/16/2018    Mild intermittent asthma without complication 8/00/2967    NASIR (obstructive sleep apnea)     Osteoporosis     Peripheral neuropathy     Schizophrenia (HCC)     Seizures (HCC)     pseudo seizures    Thyroid disease     hypothyroidism        Current Outpatient Medications   Medication Sig Dispense Refill    oxybutynin (DITROPAN XL) 5 MG extended release tablet Take 1 tablet by mouth daily 30 tablet 0    sulfamethoxazole-trimethoprim (BACTRIM DS) 800-160 MG per tablet Take 1 tablet by mouth 2 times daily for 7 days 14 tablet 0    levothyroxine (SYNTHROID) 150 MCG tablet TAKE ONE TABLET BY MOUTH DAILY 30 tablet 11    lovastatin (MEVACOR) 40 MG tablet TAKE ONE TABLET BY MOUTH ONCE NIGHTLY 30 tablet 11    ranitidine (ZANTAC) 300 MG tablet Take 1 tablet by mouth daily      divalproex (DEPAKOTE ER) 250 MG extended release tablet       metoclopramide (REGLAN) 5 MG/5ML solution       albuterol (PROVENTIL) (2.5 MG/3ML) 0.083% nebulizer solution Take 3 mLs by nebulization every 6 hours as needed for Wheezing 120 each 0    primidone (MYSOLINE) 50 MG tablet Take 50 mg by mouth 2 times daily as needed       lansoprazole (PREVACID) 30 MG capsule Take 30 mg by mouth daily       traZODone (DESYREL) 150 MG tablet Take 150 mg by mouth 2 times daily 2 tablets orally per day       No current facility-administered medications for this visit.       Allergies   Allergen Reactions    Latex Rash    Biaxin [Clarithromycin] Hives    Dicloxacillin Hives    Pcn [Penicillins] Hives    Zithromax [Azithromycin] Hives    Amoxicillin     Exelon [Rivastigmine Tartrate]     Lithium     Lyrica [Pregabalin]     Magnesium Trisilicate     Magnesium-Containing Compounds     Nystatin     Prednisone     Rivastigmine Tartrate     Seroquel [Quetiapine Fumarate] Other (See Comments)     Excess weight gain     Symbicort [Budesonide-Formoterol Fumarate] Other (See Comments)     Chest pain    Aricept [Donepezil Hydrochloride] Nausea And Vomiting    Donepezil Hcl Nausea Only    Morphine Other (See Comments)     headaches     Reviewed PMH, SH, and FH with the patient and updated. Subjective:      Review of Systems   Constitutional: Negative for chills, fatigue and fever. Respiratory: Negative. Negative for cough, chest tightness, shortness of breath and wheezing. Cardiovascular: Negative. Negative for chest pain. Gastrointestinal: Negative for nausea and vomiting. Genitourinary: Positive for enuresis, frequency and urgency. Negative for difficulty urinating, dysuria, flank pain, hematuria, hesitancy, menstrual problem, pelvic pain (denies pain or pressure), vaginal bleeding, vaginal discharge and vaginal pain. C/o incontinence at times   Skin: Negative for rash. All other systems reviewed and are negative. Objective:      Physical Exam   Constitutional: She appears well-developed. No distress. HENT:   Head: Normocephalic and atraumatic. Cardiovascular: Normal rate, regular rhythm and normal heart sounds. No murmur heard. Pulmonary/Chest: Effort normal and breath sounds normal. No respiratory distress. She has no wheezes. Abdominal: Soft. Bowel sounds are normal. She exhibits no distension. There is no tenderness. There is no CVA tenderness. Neurological: She is alert. Skin: Skin is warm and dry. She is not diaphoretic. Nursing note and vitals reviewed.     /70 (Site: Left Upper Arm, Position: Sitting, Cuff Size: Medium Adult)   Pulse 88   Temp 98.2 °F (36.8 °C) (Tympanic)   Resp 18   Ht 5' 7\" (1.702 m)   Wt 218 lb (98.9 kg)   SpO2 97%   BMI 34.14 kg/m²     Results for orders placed or performed in visit on 06/05/19   POCT Urinalysis No Micro (Auto)   Result Value Ref Range    Color, UA yellow/orange     Clarity, UA clear     Glucose, UA POC neg     Bilirubin, UA small     Ketones, UA neg     Spec Grav, UA 1.020     Blood, UA POC neg     pH, UA 6.0     Protein, UA POC neg     Urobilinogen, UA 1.0     Leukocytes, UA neg     Nitrite, UA neg      Assessment:       Diagnosis Orders   1. Urge incontinence  oxybutynin (DITROPAN XL) 5 MG extended release tablet    Navya Mina MD, Urology, [place]   2. Urinary frequency  POCT Urinalysis No Micro (Auto)    POCT Urinalysis Dipstick (No Micro)    Navya Mina MD, Urology, [place]    Urine Culture     Plan:      Urine specimen sent for a culture. Possible treatment alteration based on the results. Will trial ditropan at this time for possible overactive bladder. Ultimately she will need to see urology for further testing and treatment recommendations. The patient indicates understanding of these issues and agrees with the plan. Educational materials provided on AVS.  Follow up if symptoms do not improve/worsen. Orders Placed This Encounter   Medications    oxybutynin (DITROPAN XL) 5 MG extended release tablet     Sig: Take 1 tablet by mouth daily     Dispense:  30 tablet     Refill:  0        Patient given educational materials - see patient instructions. Discussed use, benefit, and side effects of prescribed medications. All patientquestions answered. Pt voiced understanding.     Electronically signed by RASHI Espinosa CNP on 6/5/2019at 2:22 PM

## 2019-06-05 NOTE — PATIENT INSTRUCTIONS
found a schedule that works well for you, keep doing it. · If you wake up during the night and have to urinate, do it. Apply your schedule to waking hours only. Kegel exercises  These tighten and strengthen pelvic muscles, which can help you control the flow of urine. To do Kegel exercises:  · Squeeze the same muscles you would use to stop your urine. Your belly and thighs should not move. · Hold the squeeze for 3 seconds, and then relax for 3 seconds. · Start with 3 seconds. Then add 1 second each week until you are able to squeeze for 10 seconds. · Repeat the exercise 10 to 15 times a session. Do three or more sessions a day. When should you call for help? Watch closely for changes in your health, and be sure to contact your doctor if:    · Your incontinence is getting worse.     · You do not get better as expected. Where can you learn more? Go to https://Kimble.[employer]. org and sign in to your Pythian account. Enter W760 in the [employer] box to learn more about \"Bladder Training: Care Instructions. \"     If you do not have an account, please click on the \"Sign Up Now\" link. Current as of: December 19, 2018  Content Version: 12.0  © 4263-0562 Healthwise, Incorporated. Care instructions adapted under license by [facility] ([facility]). If you have questions about a medical condition or this instruction, always ask your healthcare professional. [place] [address] any warranty or liability for your use of this information. Patient Education        Urge Incontinence in Women: Care Instructions  Your Care Instructions    Urge incontinence occurs when the need to urinate is so strong that you cannot reach the toilet in time, even when your bladder contains only a small amount of urine. This is also called overactive bladder or unstable bladder. Some women may have no warning before they leak urine. This condition does not cause major health problems.  But it can be embarrassing and can affect a woman's self-esteem and confidence. Treatment can cure or improve your symptoms. Follow-up care is a key part of your treatment and safety. Be sure to make and go to all appointments, and call your doctor if you are having problems. It's also a good idea to know your test results and keep a list of the medicines you take. How can you care for yourself at home? · Be safe with medicines. Take your medicines exactly as prescribed. Call your doctor if you think you are having a problem with your medicine. You will get more details on the specific medicines your doctor prescribes. · Limit caffeine and alcohol. They stimulate urine production. · Urinate every 2 to 4 hours during waking hours, even if you feel that you do not have to go. · Do pelvic floor (Kegel) exercises, which tighten and strengthen pelvic muscles. To do Kegel exercises:  ? Squeeze the same muscles you would use to stop your urine. Your belly and thighs should not move. ? Hold the squeeze for 3 seconds, then relax for 3 seconds. ? Start with 3 seconds. Then add 1 second each week until you are able to squeeze for 10 seconds. ? Repeat the exercise 10 to 15 times for each session. Do three or more sessions each day. · Try wearing pads that absorb the leaks. · Keep skin in the genital area dry. When should you call for help? Call your doctor now or seek immediate medical care if:    · You have new urinary symptoms. These may include leaking urine, having pain when urinating, or feeling like you need to urinate often.    Watch closely for changes in your health, and be sure to contact your doctor if:    · You do not get better as expected. Where can you learn more? Go to https://chkaty.healthGigaFin Networks. org and sign in to your PlayEarth account. Enter O259 in the [employer] box to learn more about \"Urge Incontinence in Women: Care Instructions. \"     If you do not have an account, please click on the \"Sign Up Now\" link. Current as of: May 14, 2018  Content Version: 12.0  © 2006-2019 [employer]. Care instructions adapted under license by [employer] ([facility]). If you have questions about a medical condition or this instruction, always ask your healthcare professional. [employer] any warranty or liability for your use of this information. Patient Education         Pelvic Exercises for Urinary Incontinence (02:24)  Your health professional recommends that you watch this short online health video. Learn how to do exercises that can help prevent urine leakage. How to watch the video    Scan the QR code   OR Visit the website    https://hwi. se/r/Ewocgzwtlzort   Current as of: December 19, 2018  Content Version: 12.0  © 2006-2019 [employer]. Care instructions adapted under license by [employer] ([facility]). If you have questions about a medical condition or this instruction, always ask your healthcare professional. [employer] any warranty or liability for your use of this information. Patient Education        Kegel Exercises: Care Instructions  Your Care Instructions    Kegel exercises strengthen muscles around the bladder. These muscles control the flow of urine. Kegel exercises are sometime called \"pelvic floor\" exercises. They can help prevent urine leakage and keep the pelvic organs in place. A woman who just had a baby might want to try Kegel exercises. They can strengthen pelvic muscles that have been weakened by pregnancy and childbirth. A man or woman may use Kegel exercises to treat urine leakage. You do Kegel exercises by tightening the muscles you use when you urinate. You will likely need to do these exercises for several weeks to get better. Follow-up care is a key part of your treatment and safety. Be sure to make and go to all appointments, and call your doctor if you are having problems.  It's also a good idea to know your test results and keep a list of the medicines you take. How can you care for yourself at home? · Do Kegel exercises. ? Find the muscles you need to strengthen. To do this, tighten the muscles that stop your urine while you are going to the bathroom. These are the same muscles you squeeze during Kegel exercises. ? Squeeze the muscles as hard as you can. Your belly and thighs should not move. ? Hold the squeeze for 3 seconds. Then relax for 3 seconds. ? Start with 3 seconds, and then add 1 second each week until you are able to squeeze for 10 seconds. ? Repeat the exercise 10 to 15 times for each session. Do three or more sessions each day. · You can check to see if you are using the right muscles. Place a finger in your vagina and squeeze around it. You are doing them right when you feel pressure around your finger. Your doctor may also suggest that you put special weights in your vagina while you do the exercises. · Do not smoke. It can irritate the bladder. If you need help quitting, talk to your doctor about stop-smoking programs and medicines. These can increase your chances of quitting for good. Where can you learn more? Go to https://Pel[employer].Youxinpai. org and sign in to your Prizzm account. Enter L556 in the [employer] box to learn more about \"Kegel Exercises: Care Instructions. \"     If you do not have an account, please click on the \"Sign Up Now\" link. Current as of: December 19, 2018  Content Version: 12.0  © 8793-6631 Healthwise, Incorporated. Care instructions adapted under license by [facility] ([facility]). If you have questions about a medical condition or this instruction, always ask your healthcare professional. [address] any warranty or liability for your use of this information.

## 2019-06-06 LAB
CULTURE: NO GROWTH
Lab: NORMAL
SPECIMEN DESCRIPTION: NORMAL

## 2019-11-12 ENCOUNTER — OFFICE VISIT (OUTPATIENT)
Dept: FAMILY MEDICINE CLINIC | Age: 64
End: 2019-11-12
Payer: MEDICARE

## 2019-11-12 ENCOUNTER — HOSPITAL ENCOUNTER (OUTPATIENT)
Age: 64
Setting detail: SPECIMEN
Discharge: HOME | End: 2019-11-12
Payer: MEDICARE

## 2019-11-12 VITALS
SYSTOLIC BLOOD PRESSURE: 130 MMHG | OXYGEN SATURATION: 95 % | BODY MASS INDEX: 36.57 KG/M2 | DIASTOLIC BLOOD PRESSURE: 88 MMHG | HEART RATE: 95 BPM | TEMPERATURE: 96.6 F | WEIGHT: 233 LBS | HEIGHT: 67 IN

## 2019-11-12 DIAGNOSIS — M79.89 LEG SWELLING: ICD-10-CM

## 2019-11-12 DIAGNOSIS — M79.89 LEG SWELLING: Primary | ICD-10-CM

## 2019-11-12 LAB
ABSOLUTE EOS #: 0.07 K/UL (ref 0–0.44)
ABSOLUTE IMMATURE GRANULOCYTE: 0.07 K/UL (ref 0–0.3)
ABSOLUTE LYMPH #: 1.93 K/UL (ref 1.1–3.7)
ABSOLUTE MONO #: 0.66 K/UL (ref 0.1–1.2)
ANION GAP SERPL CALCULATED.3IONS-SCNC: 16 MMOL/L (ref 9–17)
BASOPHILS # BLD: 1 % (ref 0–2)
BASOPHILS ABSOLUTE: 0.04 K/UL (ref 0–0.2)
BNP INTERPRETATION: NORMAL
BUN BLDV-MCNC: 13 MG/DL (ref 8–23)
BUN/CREAT BLD: ABNORMAL (ref 9–20)
CALCIUM SERPL-MCNC: 9.7 MG/DL (ref 8.6–10.4)
CHLORIDE BLD-SCNC: 98 MMOL/L (ref 98–107)
CO2: 26 MMOL/L (ref 20–31)
CREAT SERPL-MCNC: 0.69 MG/DL (ref 0.5–0.9)
DIFFERENTIAL TYPE: ABNORMAL
EOSINOPHILS RELATIVE PERCENT: 1 % (ref 1–4)
GFR AFRICAN AMERICAN: >60 ML/MIN
GFR NON-AFRICAN AMERICAN: >60 ML/MIN
GFR SERPL CREATININE-BSD FRML MDRD: ABNORMAL ML/MIN/{1.73_M2}
GFR SERPL CREATININE-BSD FRML MDRD: ABNORMAL ML/MIN/{1.73_M2}
GLUCOSE BLD-MCNC: 119 MG/DL (ref 70–99)
HCT VFR BLD CALC: 44 % (ref 36.3–47.1)
HEMOGLOBIN: 14.4 G/DL (ref 11.9–15.1)
IMMATURE GRANULOCYTES: 1 %
LYMPHOCYTES # BLD: 24 % (ref 24–43)
MCH RBC QN AUTO: 29.5 PG (ref 25.2–33.5)
MCHC RBC AUTO-ENTMCNC: 32.7 G/DL (ref 28.4–34.8)
MCV RBC AUTO: 90.2 FL (ref 82.6–102.9)
MONOCYTES # BLD: 8 % (ref 3–12)
NRBC AUTOMATED: 0 PER 100 WBC
PDW BLD-RTO: 12.6 % (ref 11.8–14.4)
PLATELET # BLD: 169 K/UL (ref 138–453)
PLATELET ESTIMATE: ABNORMAL
PMV BLD AUTO: 10.6 FL (ref 8.1–13.5)
POTASSIUM SERPL-SCNC: 4.9 MMOL/L (ref 3.7–5.3)
PRO-BNP: 43 PG/ML
RBC # BLD: 4.88 M/UL (ref 3.95–5.11)
RBC # BLD: ABNORMAL 10*6/UL
SEG NEUTROPHILS: 65 % (ref 36–65)
SEGMENTED NEUTROPHILS ABSOLUTE COUNT: 5.34 K/UL (ref 1.5–8.1)
SODIUM BLD-SCNC: 140 MMOL/L (ref 135–144)
WBC # BLD: 8.1 K/UL (ref 3.5–11.3)
WBC # BLD: ABNORMAL 10*3/UL

## 2019-11-12 PROCEDURE — 99214 OFFICE O/P EST MOD 30 MIN: CPT | Performed by: NURSE PRACTITIONER

## 2019-11-12 RX ORDER — ARIPIPRAZOLE 5 MG/1
5 TABLET ORAL DAILY
COMMUNITY
End: 2021-03-22

## 2019-11-12 RX ORDER — SULFAMETHOXAZOLE AND TRIMETHOPRIM 800; 160 MG/1; MG/1
1 TABLET ORAL 2 TIMES DAILY
Qty: 20 TABLET | Refills: 0 | Status: SHIPPED | OUTPATIENT
Start: 2019-11-12 | End: 2019-11-22

## 2019-11-12 ASSESSMENT — ENCOUNTER SYMPTOMS
NAUSEA: 0
COLOR CHANGE: 1
CHEST TIGHTNESS: 0
SHORTNESS OF BREATH: 0
COUGH: 0
VOMITING: 0
ABDOMINAL PAIN: 0

## 2019-11-19 ENCOUNTER — OFFICE VISIT (OUTPATIENT)
Dept: FAMILY MEDICINE CLINIC | Age: 64
End: 2019-11-19
Payer: MEDICARE

## 2019-11-19 VITALS
HEART RATE: 105 BPM | BODY MASS INDEX: 37.51 KG/M2 | SYSTOLIC BLOOD PRESSURE: 133 MMHG | TEMPERATURE: 98 F | OXYGEN SATURATION: 95 % | WEIGHT: 239 LBS | HEIGHT: 67 IN | DIASTOLIC BLOOD PRESSURE: 82 MMHG

## 2019-11-19 DIAGNOSIS — M79.662 PAIN IN BOTH LOWER LEGS: ICD-10-CM

## 2019-11-19 DIAGNOSIS — M79.89 LEG SWELLING: Primary | ICD-10-CM

## 2019-11-19 DIAGNOSIS — R06.02 SOB (SHORTNESS OF BREATH): ICD-10-CM

## 2019-11-19 DIAGNOSIS — L29.9 GENERALIZED PRURITUS: ICD-10-CM

## 2019-11-19 DIAGNOSIS — R09.89 WEAK ARTERIAL PULSE: ICD-10-CM

## 2019-11-19 DIAGNOSIS — R11.0 NAUSEA: ICD-10-CM

## 2019-11-19 DIAGNOSIS — M79.661 PAIN IN BOTH LOWER LEGS: ICD-10-CM

## 2019-11-19 PROCEDURE — 93000 ELECTROCARDIOGRAM COMPLETE: CPT | Performed by: NURSE PRACTITIONER

## 2019-11-19 PROCEDURE — 99214 OFFICE O/P EST MOD 30 MIN: CPT | Performed by: NURSE PRACTITIONER

## 2019-11-19 ASSESSMENT — ENCOUNTER SYMPTOMS
COUGH: 0
SHORTNESS OF BREATH: 1
WHEEZING: 0
CONSTIPATION: 0
VOMITING: 0
CHOKING: 0
NAUSEA: 1
ABDOMINAL PAIN: 0
DIARRHEA: 0
STRIDOR: 0
CHEST TIGHTNESS: 0
BACK PAIN: 1

## 2019-11-21 ENCOUNTER — HOSPITAL ENCOUNTER (OUTPATIENT)
Age: 64
Setting detail: SPECIMEN
Discharge: HOME | End: 2019-11-21
Payer: MEDICARE

## 2019-11-21 DIAGNOSIS — R11.0 NAUSEA: ICD-10-CM

## 2019-11-21 DIAGNOSIS — M79.89 LEG SWELLING: ICD-10-CM

## 2019-11-21 DIAGNOSIS — L29.9 GENERALIZED PRURITUS: ICD-10-CM

## 2019-11-21 DIAGNOSIS — M79.662 PAIN IN BOTH LOWER LEGS: ICD-10-CM

## 2019-11-21 DIAGNOSIS — M79.661 PAIN IN BOTH LOWER LEGS: ICD-10-CM

## 2019-11-21 DIAGNOSIS — R06.02 SOB (SHORTNESS OF BREATH): ICD-10-CM

## 2019-11-21 LAB
AMYLASE: 56 U/L (ref 28–100)
CORTISOL COLLECTION INFO: 1145
CORTISOL: 12.4 UG/DL (ref 2.7–18.4)
LIPASE: 33 U/L (ref 13–60)
MAGNESIUM: 1.9 MG/DL (ref 1.6–2.6)
SEDIMENTATION RATE, ERYTHROCYTE: 1 MM (ref 0–20)
TSH SERPL DL<=0.05 MIU/L-ACNC: 1.39 MIU/L (ref 0.3–5)
VALPROIC ACID LEVEL: 65 UG/ML (ref 50–125)
VALPROIC DATE LAST DOSE: NORMAL
VALPROIC DOSE AMOUNT: NORMAL
VALPROIC TIME LAST DOSE: NORMAL

## 2019-11-29 ENCOUNTER — HOSPITAL ENCOUNTER (OUTPATIENT)
Dept: VASCULAR LAB | Age: 64
Discharge: HOME | End: 2019-11-29
Payer: MEDICARE

## 2019-11-29 DIAGNOSIS — M79.661 PAIN IN BOTH LOWER LEGS: Primary | ICD-10-CM

## 2019-11-29 DIAGNOSIS — M79.89 LEG SWELLING: ICD-10-CM

## 2019-11-29 DIAGNOSIS — M79.662 PAIN IN BOTH LOWER LEGS: Primary | ICD-10-CM

## 2019-11-29 PROCEDURE — 93970 EXTREMITY STUDY: CPT

## 2019-11-29 PROCEDURE — 93924 LWR XTR VASC STDY BILAT: CPT

## 2019-12-05 ENCOUNTER — OFFICE VISIT (OUTPATIENT)
Dept: FAMILY MEDICINE CLINIC | Age: 64
End: 2019-12-05
Payer: MEDICARE

## 2019-12-05 VITALS
HEIGHT: 67 IN | DIASTOLIC BLOOD PRESSURE: 75 MMHG | BODY MASS INDEX: 37.2 KG/M2 | HEART RATE: 75 BPM | SYSTOLIC BLOOD PRESSURE: 135 MMHG | WEIGHT: 237 LBS | TEMPERATURE: 96.6 F | OXYGEN SATURATION: 95 %

## 2019-12-05 DIAGNOSIS — R06.02 SOB (SHORTNESS OF BREATH): ICD-10-CM

## 2019-12-05 DIAGNOSIS — M79.89 LEG SWELLING: Primary | ICD-10-CM

## 2019-12-05 DIAGNOSIS — R11.0 NAUSEA: ICD-10-CM

## 2019-12-05 PROBLEM — K59.09 CHRONIC CONSTIPATION: Status: ACTIVE | Noted: 2019-12-05

## 2019-12-05 PROBLEM — F02.80 ALZHEIMER'S DISEASE (HCC): Status: ACTIVE | Noted: 2019-12-05

## 2019-12-05 PROBLEM — Z86.0100 HISTORY OF COLONIC POLYPS: Status: ACTIVE | Noted: 2019-12-05

## 2019-12-05 PROBLEM — F44.5 PSEUDOSEIZURES: Status: ACTIVE | Noted: 2019-12-05

## 2019-12-05 PROBLEM — K21.9 GASTROESOPHAGEAL REFLUX DISEASE: Status: ACTIVE | Noted: 2019-12-05

## 2019-12-05 PROBLEM — Z95.9 CARDIAC DEVICE IN SITU: Status: ACTIVE | Noted: 2019-12-05

## 2019-12-05 PROBLEM — F31.9 BIPOLAR 1 DISORDER (HCC): Status: ACTIVE | Noted: 2019-12-05

## 2019-12-05 PROBLEM — F41.9 ANXIETY: Status: ACTIVE | Noted: 2019-12-05

## 2019-12-05 PROBLEM — G30.9 ALZHEIMER'S DISEASE (HCC): Status: ACTIVE | Noted: 2019-12-05

## 2019-12-05 PROBLEM — I11.9 HYPERTENSIVE HEART DISEASE WITHOUT CONGESTIVE HEART FAILURE: Status: ACTIVE | Noted: 2019-12-05

## 2019-12-05 PROBLEM — R56.9 PSEUDOSEIZURES (HCC): Status: ACTIVE | Noted: 2019-12-05

## 2019-12-05 PROBLEM — Z86.010 HISTORY OF COLONIC POLYPS: Status: ACTIVE | Noted: 2019-12-05

## 2019-12-05 PROCEDURE — 99214 OFFICE O/P EST MOD 30 MIN: CPT | Performed by: NURSE PRACTITIONER

## 2019-12-05 RX ORDER — FAMOTIDINE 40 MG/1
40 TABLET, FILM COATED ORAL EVERY EVENING
Qty: 30 TABLET | Refills: 3 | Status: SHIPPED | OUTPATIENT
Start: 2019-12-05 | End: 2020-03-31

## 2019-12-05 ASSESSMENT — ENCOUNTER SYMPTOMS
SHORTNESS OF BREATH: 1
RHINORRHEA: 0
CHEST TIGHTNESS: 0
SORE THROAT: 0
CONSTIPATION: 0
NAUSEA: 1
BLOOD IN STOOL: 0
VOMITING: 0
WHEEZING: 0
ABDOMINAL PAIN: 0
SINUS PAIN: 0
SINUS PRESSURE: 0
COUGH: 0
DIARRHEA: 0

## 2019-12-12 ENCOUNTER — HOSPITAL ENCOUNTER (OUTPATIENT)
Dept: NON INVASIVE DIAGNOSTICS | Age: 64
Discharge: HOME | End: 2019-12-12
Payer: MEDICARE

## 2019-12-12 DIAGNOSIS — R06.02 SOB (SHORTNESS OF BREATH): ICD-10-CM

## 2019-12-12 DIAGNOSIS — M79.89 LEG SWELLING: ICD-10-CM

## 2019-12-12 LAB
LV EF: 65 %
LVEF MODALITY: NORMAL

## 2019-12-12 PROCEDURE — 93306 TTE W/DOPPLER COMPLETE: CPT

## 2019-12-17 ENCOUNTER — HOSPITAL ENCOUNTER (OUTPATIENT)
Age: 64
Setting detail: SPECIMEN
Discharge: HOME | End: 2019-12-17
Payer: MEDICARE

## 2019-12-17 ENCOUNTER — OFFICE VISIT (OUTPATIENT)
Dept: FAMILY MEDICINE CLINIC | Age: 64
End: 2019-12-17
Payer: MEDICARE

## 2019-12-17 VITALS
WEIGHT: 237 LBS | SYSTOLIC BLOOD PRESSURE: 133 MMHG | DIASTOLIC BLOOD PRESSURE: 70 MMHG | TEMPERATURE: 98.2 F | BODY MASS INDEX: 37.2 KG/M2 | OXYGEN SATURATION: 90 % | HEIGHT: 67 IN | HEART RATE: 89 BPM

## 2019-12-17 DIAGNOSIS — Z82.69 FAMILY HISTORY OF RHEUMATIC JOINT DISEASE: ICD-10-CM

## 2019-12-17 DIAGNOSIS — M25.50 ARTHRALGIA, UNSPECIFIED JOINT: ICD-10-CM

## 2019-12-17 DIAGNOSIS — M79.89 LEG SWELLING: ICD-10-CM

## 2019-12-17 DIAGNOSIS — M25.50 ARTHRALGIA, UNSPECIFIED JOINT: Primary | ICD-10-CM

## 2019-12-17 DIAGNOSIS — M79.7 FIBROMYALGIA: ICD-10-CM

## 2019-12-17 DIAGNOSIS — Z12.31 ENCOUNTER FOR SCREENING MAMMOGRAM FOR MALIGNANT NEOPLASM OF BREAST: ICD-10-CM

## 2019-12-17 DIAGNOSIS — G47.33 OBSTRUCTIVE SLEEP APNEA SYNDROME: ICD-10-CM

## 2019-12-17 LAB — RHEUMATOID FACTOR: <10 IU/ML

## 2019-12-17 PROCEDURE — 99213 OFFICE O/P EST LOW 20 MIN: CPT | Performed by: NURSE PRACTITIONER

## 2019-12-17 ASSESSMENT — ENCOUNTER SYMPTOMS
VOMITING: 0
BACK PAIN: 0
CHEST TIGHTNESS: 0
NAUSEA: 0
SHORTNESS OF BREATH: 0
ABDOMINAL PAIN: 0
COUGH: 0

## 2019-12-18 LAB — ANTI-NUCLEAR ANTIBODY (ANA): NEGATIVE

## 2019-12-24 RX ORDER — DULOXETIN HYDROCHLORIDE 30 MG/1
30 CAPSULE, DELAYED RELEASE ORAL DAILY
Qty: 30 CAPSULE | Refills: 5 | Status: SHIPPED | OUTPATIENT
Start: 2019-12-24 | End: 2019-12-30 | Stop reason: DRUGHIGH

## 2019-12-30 ENCOUNTER — OFFICE VISIT (OUTPATIENT)
Dept: FAMILY MEDICINE CLINIC | Age: 64
End: 2019-12-30
Payer: MEDICARE

## 2019-12-30 VITALS
OXYGEN SATURATION: 94 % | TEMPERATURE: 97 F | BODY MASS INDEX: 36.41 KG/M2 | WEIGHT: 232 LBS | SYSTOLIC BLOOD PRESSURE: 139 MMHG | HEIGHT: 67 IN | HEART RATE: 73 BPM | DIASTOLIC BLOOD PRESSURE: 72 MMHG

## 2019-12-30 DIAGNOSIS — M79.7 FIBROMYALGIA: Primary | ICD-10-CM

## 2019-12-30 DIAGNOSIS — R73.03 PRE-DIABETES: ICD-10-CM

## 2019-12-30 DIAGNOSIS — L85.3 DRY SKIN: ICD-10-CM

## 2019-12-30 LAB — HBA1C MFR BLD: 5.9 %

## 2019-12-30 PROCEDURE — 99213 OFFICE O/P EST LOW 20 MIN: CPT | Performed by: NURSE PRACTITIONER

## 2019-12-30 PROCEDURE — 83036 HEMOGLOBIN GLYCOSYLATED A1C: CPT | Performed by: NURSE PRACTITIONER

## 2019-12-30 PROCEDURE — 3045F PR MOST RECENT HEMOGLOBIN A1C LEVEL 7.0-9.0%: CPT | Performed by: NURSE PRACTITIONER

## 2019-12-30 RX ORDER — AMMONIUM LACTATE 12 G/100G
LOTION TOPICAL
Qty: 225 G | Refills: 0 | Status: SHIPPED | OUTPATIENT
Start: 2019-12-30 | End: 2020-08-04

## 2019-12-30 RX ORDER — DULOXETIN HYDROCHLORIDE 60 MG/1
60 CAPSULE, DELAYED RELEASE ORAL DAILY
Qty: 30 CAPSULE | Refills: 5 | Status: SHIPPED | OUTPATIENT
Start: 2019-12-30 | End: 2020-06-24

## 2019-12-30 ASSESSMENT — ENCOUNTER SYMPTOMS
NAUSEA: 0
COUGH: 0
SHORTNESS OF BREATH: 0

## 2020-01-02 ENCOUNTER — TELEPHONE (OUTPATIENT)
Dept: FAMILY MEDICINE CLINIC | Age: 65
End: 2020-01-02

## 2020-01-02 NOTE — TELEPHONE ENCOUNTER
Nasra Schmitt from [facility] called, patient is asking for a cpap. Can you place order please?

## 2020-02-21 ENCOUNTER — OFFICE VISIT (OUTPATIENT)
Dept: FAMILY MEDICINE CLINIC | Age: 65
End: 2020-02-21
Payer: COMMERCIAL

## 2020-02-21 VITALS
WEIGHT: 227 LBS | HEIGHT: 67 IN | SYSTOLIC BLOOD PRESSURE: 127 MMHG | HEART RATE: 77 BPM | TEMPERATURE: 96.3 F | BODY MASS INDEX: 35.63 KG/M2 | DIASTOLIC BLOOD PRESSURE: 72 MMHG | OXYGEN SATURATION: 96 %

## 2020-02-21 PROCEDURE — 99213 OFFICE O/P EST LOW 20 MIN: CPT | Performed by: NURSE PRACTITIONER

## 2020-02-21 RX ORDER — DOXYCYCLINE HYCLATE 100 MG
100 TABLET ORAL 2 TIMES DAILY
Qty: 14 TABLET | Refills: 0 | Status: SHIPPED | OUTPATIENT
Start: 2020-02-21 | End: 2020-02-28

## 2020-02-21 RX ORDER — DEXTROMETHORPHAN POLISTIREX 30 MG/5ML
60 SUSPENSION ORAL 2 TIMES DAILY PRN
Qty: 150 ML | Refills: 0 | Status: SHIPPED | OUTPATIENT
Start: 2020-02-21 | End: 2020-03-02

## 2020-02-21 ASSESSMENT — ENCOUNTER SYMPTOMS
CHEST TIGHTNESS: 1
TROUBLE SWALLOWING: 0
ABDOMINAL PAIN: 0
RHINORRHEA: 0
WHEEZING: 0
COUGH: 1
SHORTNESS OF BREATH: 0
VOICE CHANGE: 1
SINUS PRESSURE: 0
SORE THROAT: 0

## 2020-02-21 NOTE — PROGRESS NOTES
01/10/2018     Family History   Problem Relation Age of Onset    Cancer Mother         skin    Heart Disease Mother     Thyroid Disease Mother     Stroke Mother     Heart Disease Father     High Blood Pressure Father     Stroke Father     Other Father         alzhemier's      Social History     Tobacco Use    Smoking status: Current Every Day Smoker     Packs/day: 0.50     Years: 50.00     Pack years: 25.00     Types: Cigarettes    Smokeless tobacco: Never Used   Substance Use Topics    Alcohol use: No     Alcohol/week: 0.0 standard drinks      Current Outpatient Medications   Medication Sig Dispense Refill    doxycycline hyclate (VIBRA-TABS) 100 MG tablet Take 1 tablet by mouth 2 times daily for 7 days 14 tablet 0    dextromethorphan (DELSYM) 30 MG/5ML extended release liquid Take 10 mLs by mouth 2 times daily as needed for Cough 150 mL 0    DULoxetine (CYMBALTA) 60 MG extended release capsule Take 1 capsule by mouth daily 30 capsule 5    ammonium lactate (LAC-HYDRIN) 12 % lotion Apply topically daily. 225 g 0    famotidine (PEPCID) 40 MG tablet Take 1 tablet by mouth every evening 30 tablet 3    ARIPiprazole (ABILIFY) 5 MG tablet Take 5 mg by mouth daily      mirabegron (MYRBETRIQ) 50 MG TB24 Take 50 mg by mouth daily      levothyroxine (SYNTHROID) 150 MCG tablet TAKE ONE TABLET BY MOUTH DAILY 30 tablet 11    lovastatin (MEVACOR) 40 MG tablet TAKE ONE TABLET BY MOUTH ONCE NIGHTLY 30 tablet 11    divalproex (DEPAKOTE ER) 250 MG extended release tablet One in morning, 2 at night      albuterol (PROVENTIL) (2.5 MG/3ML) 0.083% nebulizer solution Take 3 mLs by nebulization every 6 hours as needed for Wheezing 120 each 0    traZODone (DESYREL) 150 MG tablet Take 150 mg by mouth 2 times daily 2 tablets orally per day       No current facility-administered medications for this visit.       Allergies   Allergen Reactions    Latex Rash    Biaxin [Clarithromycin] Hives    Dicloxacillin Hives    Pcn [Penicillins] Hives    Zithromax [Azithromycin] Hives    Amoxicillin     Exelon [Rivastigmine Tartrate]     Lithium     Lyrica [Pregabalin]     Magnesium Trisilicate     Magnesium-Containing Compounds     Nystatin     Prednisone     Rivastigmine Tartrate     Seroquel [Quetiapine Fumarate] Other (See Comments)     Excess weight gain     Symbicort [Budesonide-Formoterol Fumarate] Other (See Comments)     Chest pain    Aricept [Donepezil Hydrochloride] Nausea And Vomiting    Donepezil Hcl Nausea Only    Morphine Other (See Comments)     headaches       Health Maintenance   Topic Date Due    Shingles Vaccine (1 of 2) 11/19/2007    Annual Wellness Visit (AWV)  05/29/2019    Breast cancer screen  08/18/2019    DTaP/Tdap/Td vaccine (1 - Tdap) 11/12/2020 (Originally [birth year]19/1966)    Flu vaccine (1) 12/17/2020 (Originally 9/1/2019)    Lipid screen  03/25/2020    Colon cancer screen colonoscopy  09/02/2020    TSH testing  11/21/2020    A1C test (Diabetic or Prediabetic)  12/30/2020    Pneumococcal 0-64 years Vaccine  Completed    Hepatitis C screen  Completed    HIV screen  Completed    Hepatitis A vaccine  Aged Out    Hepatitis B vaccine  Aged Out    Hib vaccine  Aged Out    Meningococcal (ACWY) vaccine  Aged Out       Subjective:      Review of Systems   Constitutional: Positive for activity change, appetite change, chills, fatigue and fever (low grade). Negative for diaphoresis, unexpected weight change and weight loss. HENT: Positive for congestion and voice change. Negative for ear discharge, ear pain, hearing loss, postnasal drip, rhinorrhea, sinus pressure, sneezing, sore throat and trouble swallowing. Respiratory: Positive for cough and chest tightness. Negative for shortness of breath and wheezing. Cardiovascular: Negative for chest pain and palpitations. Gastrointestinal: Negative for abdominal pain. Genitourinary: Negative for decreased urine volume.    Musculoskeletal: Negative for myalgias. Skin: Negative for rash. Neurological: Negative for dizziness and headaches. Psychiatric/Behavioral: Positive for sleep disturbance. Negative for confusion. Objective:      Physical Exam  Vitals signs and nursing note reviewed. Constitutional:       General: She is not in acute distress. Appearance: Normal appearance. She is well-developed. She is not ill-appearing or diaphoretic. Comments: /72 (Site: Right Lower Arm, Position: Sitting, Cuff Size: Medium Adult)   Pulse 77   Temp 96.3 °F (35.7 °C) (Tympanic)   Ht 5' 7\" (1.702 m)   Wt 227 lb (103 kg)   SpO2 96%   BMI 35.55 kg/m²      HENT:      Head: Normocephalic and atraumatic. Right Ear: Hearing, tympanic membrane, ear canal and external ear normal.      Left Ear: Hearing, tympanic membrane, ear canal and external ear normal.      Nose: Nose normal.      Right Sinus: No maxillary sinus tenderness or frontal sinus tenderness. Left Sinus: No maxillary sinus tenderness or frontal sinus tenderness. Mouth/Throat:      Pharynx: Uvula midline. No oropharyngeal exudate. Neck:      Musculoskeletal: Normal range of motion and neck supple. Cardiovascular:      Rate and Rhythm: Normal rate and regular rhythm. Heart sounds: Normal heart sounds. Pulmonary:      Effort: Pulmonary effort is normal. No respiratory distress. Breath sounds: Normal breath sounds. No wheezing. Lymphadenopathy:      Cervical: No cervical adenopathy. Skin:     General: Skin is warm and dry. Capillary Refill: Capillary refill takes less than 2 seconds. Findings: No rash. Neurological:      General: No focal deficit present. Mental Status: She is alert and oriented to person, place, and time. Psychiatric:         Mood and Affect: Mood normal.         Behavior: Behavior normal.         Thought Content:  Thought content normal.         Judgment: Judgment normal.         Assessment:       Diagnosis Orders   1. Bronchitis  doxycycline hyclate (VIBRA-TABS) 100 MG tablet    dextromethorphan (DELSYM) 30 MG/5ML extended release liquid   2. Laryngitis         Plan:      Return in about 3 months (around 5/21/2020) for medicare wellness visit. Pt request abx since she can't take steroids  Cough med given also  Salt water gargles, tea with honey, cough drops, motrin/tylenol prn with food  Voice rest  push fluids ( water, Gatorade, tea), and rest as much as able  Cool mist vaporizer  Call INB or worsening    Orders Placed This Encounter   Medications    doxycycline hyclate (VIBRA-TABS) 100 MG tablet     Sig: Take 1 tablet by mouth 2 times daily for 7 days     Dispense:  14 tablet     Refill:  0    dextromethorphan (DELSYM) 30 MG/5ML extended release liquid     Sig: Take 10 mLs by mouth 2 times daily as needed for Cough     Dispense:  150 mL     Refill:  0       Patient given educational materials - see patient instructions. Discussed use,benefit, and side effects of prescribed medications. All patient questions answered. Pt voiced understanding. Reviewed health maintenance. Instructed to continue currentmedications, diet and exercise.     Electronically signed by George Robertson CNP on 2/21/2020 at 10:16 AM

## 2020-02-21 NOTE — PROGRESS NOTES
Visit Information    Have you changed or started any medications since your last visit including any over-the-counter medicines, vitamins, or herbal medicines? no   Have you stopped taking any of your medications? Is so, why? -  no  Are you having any side effects from any of your medications? - no    Have you seen any other physician or provider since your last visit?  no   Have you had any other diagnostic tests since your last visit?  no   Have you been seen in the emergency room and/or had an admission in a hospital since we last saw you?  no   Have you had your routine dental cleaning in the past 6 months?  no     Do you have an active MyChart account? If no, what is the barrier?   No: declined    Patient Care Team:  RASHI Man CNP as PCP - General (Family Nurse Practitioner)  RASHI Man CNP as PCP - [facility] Provider    Medical History Review  Past Medical, Family, and Social History reviewed and  contribute to the patient presenting condition    Health Maintenance   Topic Date Due    Shingles Vaccine (1 of 2) 11/19/2007    Annual Wellness Visit (AWV)  05/29/2019    Breast cancer screen  08/18/2019    DTaP/Tdap/Td vaccine (1 - Tdap) 11/12/2020 (Originally 11/19/1968)    Flu vaccine (1) 12/17/2020 (Originally 9/1/2019)    Lipid screen  03/25/2020    Colon cancer screen colonoscopy  09/02/2020    TSH testing  11/21/2020    A1C test (Diabetic or Prediabetic)  12/30/2020    Pneumococcal 0-64 years Vaccine  Completed    Hepatitis C screen  Completed    HIV screen  Completed    Hepatitis A vaccine  Aged Out    Hepatitis B vaccine  Aged Out    Hib vaccine  Aged Out    Meningococcal (ACWY) vaccine  Aged Out

## 2020-03-16 ENCOUNTER — OFFICE VISIT (OUTPATIENT)
Dept: FAMILY MEDICINE CLINIC | Age: 65
End: 2020-03-16
Payer: COMMERCIAL

## 2020-03-16 VITALS
HEIGHT: 67 IN | OXYGEN SATURATION: 96 % | WEIGHT: 228 LBS | HEART RATE: 70 BPM | TEMPERATURE: 96 F | DIASTOLIC BLOOD PRESSURE: 84 MMHG | SYSTOLIC BLOOD PRESSURE: 132 MMHG | RESPIRATION RATE: 16 BRPM | BODY MASS INDEX: 35.79 KG/M2

## 2020-03-16 PROCEDURE — 99213 OFFICE O/P EST LOW 20 MIN: CPT | Performed by: INTERNAL MEDICINE

## 2020-03-16 RX ORDER — METHOCARBAMOL 500 MG/1
500 TABLET, FILM COATED ORAL 4 TIMES DAILY
Qty: 40 TABLET | Refills: 0 | Status: SHIPPED | OUTPATIENT
Start: 2020-03-16 | End: 2020-03-26

## 2020-03-16 RX ORDER — HYDROCODONE BITARTRATE AND ACETAMINOPHEN 5; 325 MG/1; MG/1
1 TABLET ORAL EVERY 8 HOURS PRN
Qty: 18 TABLET | Refills: 0 | Status: SHIPPED | OUTPATIENT
Start: 2020-03-16 | End: 2020-03-21

## 2020-03-16 ASSESSMENT — ENCOUNTER SYMPTOMS
RECTAL PAIN: 0
WHEEZING: 0
BOWEL INCONTINENCE: 0
DIARRHEA: 0
CHEST TIGHTNESS: 0
SHORTNESS OF BREATH: 0
BACK PAIN: 1
COUGH: 0
ABDOMINAL PAIN: 0
STRIDOR: 0
CONSTIPATION: 0
NAUSEA: 0
VOMITING: 0
CHOKING: 0

## 2020-03-16 NOTE — PROGRESS NOTES
Prediabetic)  12/30/2020    Pneumococcal 0-64 years Vaccine  Completed    Hepatitis C screen  Completed    HIV screen  Completed    Hepatitis A vaccine  Aged Out    Hepatitis B vaccine  Aged Out    Hib vaccine  Aged Out    Meningococcal (ACWY) vaccine  Aged Out       Subjective:     Review of Systems   Constitutional: Positive for activity change. Negative for fatigue, fever and unexpected weight change. Eyes: Negative for visual disturbance. Respiratory: Negative for cough, choking, chest tightness, shortness of breath, wheezing and stridor. Cardiovascular: Negative for chest pain, palpitations and leg swelling. Gastrointestinal: Negative for abdominal pain, bowel incontinence, constipation, diarrhea, nausea, rectal pain and vomiting. Genitourinary: Negative for bladder incontinence, dysuria, frequency and pelvic pain. Musculoskeletal: Positive for arthralgias and back pain. Negative for gait problem. Skin: Negative for rash. Neurological: Positive for weakness. Negative for tingling, numbness and headaches. Hematological: Negative for adenopathy. Does not bruise/bleed easily. Psychiatric/Behavioral: Negative for decreased concentration, dysphoric mood and sleep disturbance. Objective:      Physical Exam  Vitals signs and nursing note reviewed. Constitutional:       General: She is not in acute distress. Appearance: She is obese. She is not ill-appearing, toxic-appearing or diaphoretic. Musculoskeletal:      Lumbar back: She exhibits decreased range of motion, tenderness, bony tenderness, pain and spasm. She exhibits no swelling, no edema, no deformity and no laceration. Neurological:      Mental Status: She is alert. Cranial Nerves: Cranial nerves are intact. Sensory: Sensation is intact. Motor: Motor function is intact. Deep Tendon Reflexes: Reflexes are normal and symmetric.        /84 (Site: Right Upper Arm, Position: Sitting, Cuff Size: Medium Adult)   Pulse 70   Temp 96 °F (35.6 °C) (Tympanic)   Resp 16   Ht 5' 7\" (1.702 m)   Wt 228 lb (103.4 kg)   SpO2 96%   BMI 35.71 kg/m²     Assessment:       Diagnosis Orders   1. Acute bilateral low back pain with sciatica, sciatica laterality unspecified  XR LUMBAR SPINE (2-3 VIEWS)    HYDROcodone-acetaminophen (NORCO) 5-325 MG per tablet   2. Muscle spasm  XR LUMBAR SPINE (2-3 VIEWS)    HYDROcodone-acetaminophen (NORCO) 5-325 MG per tablet             Plan:       Return if symptoms worsen or fail to improve. Orders Placed This Encounter   Procedures    XR LUMBAR SPINE (2-3 VIEWS)     Standing Status:   Future     Number of Occurrences:   1     Standing Expiration Date:   3/16/2021     Order Specific Question:   Reason for exam:     Answer:   low bac pain , injury     Orders Placed This Encounter   Medications    methocarbamol (ROBAXIN) 500 MG tablet     Sig: Take 1 tablet by mouth 4 times daily for 10 days     Dispense:  40 tablet     Refill:  0    HYDROcodone-acetaminophen (NORCO) 5-325 MG per tablet     Sig: Take 1 tablet by mouth every 8 hours as needed for Pain for up to 5 days. Intended supply: 5 days. Take lowest dose possible to manage pain     Dispense:  18 tablet     Refill:  0     Reduce doses taken as pain becomes manageable    check xray   Patient cannot tolerate steroids /SE. Very small script for pain medication no more than 5 days and no further if xray normal   Muscle relaxant, use sparingly   Reviewed SE. Given mild MR. Call with q/c      Patientgiven educational materials - see patient instructions. Discussed use, benefit,and side effects of prescribed medications. All patient questions answered. Ptvoiced understanding. Reviewed health maintenance. Instructed to continue currentmedications, diet and exercise. Patient agreed with treatment plan. Follow up asdirected.      Electronically signed by En Lloyd DO on 3/16/2020 at 10:12 AM

## 2020-03-26 ENCOUNTER — TELEPHONE (OUTPATIENT)
Dept: FAMILY MEDICINE CLINIC | Age: 65
End: 2020-03-26

## 2020-03-26 RX ORDER — MELOXICAM 7.5 MG/1
7.5 TABLET ORAL 2 TIMES DAILY
Qty: 60 TABLET | Refills: 5 | Status: SHIPPED | OUTPATIENT
Start: 2020-03-26 | End: 2020-08-04

## 2020-03-26 RX ORDER — CLOTRIMAZOLE AND BETAMETHASONE DIPROPIONATE 10; .64 MG/G; MG/G
CREAM TOPICAL
Qty: 45 G | Refills: 2 | Status: SHIPPED | OUTPATIENT
Start: 2020-03-26 | End: 2020-08-04

## 2020-03-26 NOTE — TELEPHONE ENCOUNTER
Patient states insurance didn't cover muscle relaxer? And is requesting pain med for her back also. im calling pharmacy to verify.

## 2020-03-26 NOTE — TELEPHONE ENCOUNTER
I sent a cream she can put on it twice daily , if it does not improve though she will need to be seen or do a video visit

## 2020-03-26 NOTE — TELEPHONE ENCOUNTER
Rash under both breasts that is worsening started a few days after taking pain meds and muscle relaxants for back pain on 3/16/20. Please advise pt as what to do.

## 2020-03-31 RX ORDER — FAMOTIDINE 40 MG/1
TABLET, FILM COATED ORAL
Qty: 30 TABLET | Refills: 2 | Status: SHIPPED | OUTPATIENT
Start: 2020-03-31 | End: 2020-07-01

## 2020-04-01 ENCOUNTER — TELEPHONE (OUTPATIENT)
Dept: FAMILY MEDICINE CLINIC | Age: 65
End: 2020-04-01

## 2020-04-03 ENCOUNTER — OFFICE VISIT (OUTPATIENT)
Dept: FAMILY MEDICINE CLINIC | Age: 65
End: 2020-04-03
Payer: COMMERCIAL

## 2020-04-03 VITALS
BODY MASS INDEX: 35.71 KG/M2 | OXYGEN SATURATION: 94 % | DIASTOLIC BLOOD PRESSURE: 82 MMHG | SYSTOLIC BLOOD PRESSURE: 133 MMHG | HEART RATE: 66 BPM | TEMPERATURE: 97.2 F | HEIGHT: 67 IN

## 2020-04-03 PROCEDURE — 99213 OFFICE O/P EST LOW 20 MIN: CPT | Performed by: NURSE PRACTITIONER

## 2020-04-03 ASSESSMENT — ENCOUNTER SYMPTOMS
BACK PAIN: 1
SHORTNESS OF BREATH: 0
ABDOMINAL PAIN: 0

## 2020-04-03 NOTE — PROGRESS NOTES
Magnesium-Containing Compounds     Nystatin     Prednisone     Rivastigmine Tartrate     Seroquel [Quetiapine Fumarate] Other (See Comments)     Excess weight gain     Symbicort [Budesonide-Formoterol Fumarate] Other (See Comments)     Chest pain    Aricept [Donepezil Hydrochloride] Nausea And Vomiting    Donepezil Hcl Nausea Only    Morphine Other (See Comments)     headaches       Health Maintenance   Topic Date Due    Shingles Vaccine (1 of 2) 11/19/2007    Annual Wellness Visit (AWV)  05/29/2019    Breast cancer screen  08/18/2019    Lipid screen  03/25/2020    DTaP/Tdap/Td vaccine (1 - Tdap) 11/12/2020 (Originally 11/19/1976)    Flu vaccine (Season Ended) 12/17/2020 (Originally 9/1/2020)    Colon cancer screen colonoscopy  09/02/2020    TSH testing  11/21/2020    A1C test (Diabetic or Prediabetic)  12/30/2020    Pneumococcal 0-64 years Vaccine  Completed    Hepatitis C screen  Completed    HIV screen  Completed    Hepatitis A vaccine  Aged Out    Hepatitis B vaccine  Aged Out    Hib vaccine  Aged Out    Meningococcal (ACWY) vaccine  Aged Out       Subjective:      Review of Systems   Constitutional: Positive for activity change. Negative for chills, fatigue and fever. Respiratory: Negative for shortness of breath. Cardiovascular: Negative for chest pain and leg swelling. Gastrointestinal: Negative for abdominal pain. No bowel or bladder control issues   Genitourinary: Negative for decreased urine volume, difficulty urinating and flank pain. Musculoskeletal: Positive for arthralgias, back pain and myalgias. Negative for joint swelling, neck pain and neck stiffness. Skin: Negative for rash and wound. Neurological: Negative for dizziness, weakness, numbness and headaches. Psychiatric/Behavioral: Positive for sleep disturbance. Objective:      Physical Exam  Vitals signs and nursing note reviewed. Constitutional:       General: She is not in acute distress. Appearance: Normal appearance. She is well-developed. She is not ill-appearing or diaphoretic. HENT:      Head: Normocephalic and atraumatic. Neck:      Musculoskeletal: Normal range of motion and neck supple. Musculoskeletal: Normal range of motion. General: Tenderness (generalized lumbar spine and bilateral praspinal muscle, normal gait and limited ROM of back and hips, normal leg strength biltaterally) present. No deformity. Skin:     General: Skin is warm and dry. Capillary Refill: Capillary refill takes less than 2 seconds. Findings: No erythema or rash. Neurological:      Mental Status: She is alert and oriented to person, place, and time. Cranial Nerves: No cranial nerve deficit. Sensory: No sensory deficit. Motor: No abnormal muscle tone. Coordination: Coordination normal.   Psychiatric:         Mood and Affect: Mood normal.         Behavior: Behavior normal.         Thought Content: Thought content normal.         Judgment: Judgment normal.         Assessment:       Diagnosis Orders   1. Acute midline low back pain with bilateral sciatica  CT LUMBAR SPINE WO CONTRAST    Amb External Referral To Physical Therapy     Narrative   EXAMINATION:   THREE XRAY VIEWS OF THE LUMBAR SPINE       3/16/2020 9:56 am       COMPARISON:   None.       HISTORY:   ORDERING SYSTEM PROVIDED HISTORY: Acute bilateral low back pain with   sciatica, sciatica laterality unspecified   TECHNOLOGIST PROVIDED HISTORY:   low freddy pain , injury       FINDINGS:   Three views of the lumbar spine are submitted for review.  No acute fracture   or malalignment of the lumbar spine.  Overlying soft tissues are   unremarkable.  Surgical clips in the right upper quadrant are most compatible   with previous cholecystectomy.           Impression   No radiographic abnormality of the lumbar spine. Plan:      No follow-ups on file.   Orders Placed This Encounter   Procedures    CT LUMBAR SPINE WO

## 2020-04-06 ENCOUNTER — TELEPHONE (OUTPATIENT)
Dept: FAMILY MEDICINE CLINIC | Age: 65
End: 2020-04-06

## 2020-04-06 RX ORDER — LEVOTHYROXINE SODIUM 0.15 MG/1
TABLET ORAL
Qty: 30 TABLET | Refills: 10 | Status: SHIPPED | OUTPATIENT
Start: 2020-04-06 | End: 2021-04-13

## 2020-04-13 RX ORDER — LOVASTATIN 40 MG/1
TABLET ORAL
Qty: 30 TABLET | Refills: 10 | Status: SHIPPED | OUTPATIENT
Start: 2020-04-13 | End: 2021-03-16

## 2020-04-16 ENCOUNTER — TELEPHONE (OUTPATIENT)
Dept: FAMILY MEDICINE CLINIC | Age: 65
End: 2020-04-16

## 2020-04-16 DIAGNOSIS — M54.41 ACUTE MIDLINE LOW BACK PAIN WITH BILATERAL SCIATICA: Primary | ICD-10-CM

## 2020-04-16 DIAGNOSIS — E78.2 MIXED HYPERLIPIDEMIA: ICD-10-CM

## 2020-04-16 DIAGNOSIS — M79.7 FIBROMYALGIA: ICD-10-CM

## 2020-04-16 DIAGNOSIS — M54.42 ACUTE MIDLINE LOW BACK PAIN WITH BILATERAL SCIATICA: Primary | ICD-10-CM

## 2020-04-16 NOTE — TELEPHONE ENCOUNTER
Please review, I guess she can't see me anymore d/t insurance? Even though I just saw her last week? Anyway, I think referral should be under your name, but I would suggest either pain mgmt or neurosurg.  For injections for her back as she has failed conservative rx and can't have MRI, let me know if any other questions

## 2020-05-11 ENCOUNTER — OFFICE VISIT (OUTPATIENT)
Dept: NEUROLOGY | Age: 65
End: 2020-05-11
Payer: COMMERCIAL

## 2020-05-11 VITALS
TEMPERATURE: 98.3 F | WEIGHT: 226 LBS | HEIGHT: 67 IN | HEART RATE: 91 BPM | BODY MASS INDEX: 35.47 KG/M2 | DIASTOLIC BLOOD PRESSURE: 89 MMHG | SYSTOLIC BLOOD PRESSURE: 146 MMHG

## 2020-05-11 PROCEDURE — 99204 OFFICE O/P NEW MOD 45 MIN: CPT | Performed by: PSYCHIATRY & NEUROLOGY

## 2020-05-11 RX ORDER — GABAPENTIN 300 MG/1
CAPSULE ORAL
Qty: 90 CAPSULE | Refills: 2 | Status: SHIPPED | OUTPATIENT
Start: 2020-05-11 | End: 2020-08-04

## 2020-05-11 RX ORDER — CYCLOBENZAPRINE HCL 5 MG
5 TABLET ORAL 3 TIMES DAILY PRN
Qty: 90 TABLET | Refills: 2 | Status: SHIPPED | OUTPATIENT
Start: 2020-05-11 | End: 2020-05-21

## 2020-05-11 RX ORDER — NAPROXEN 500 MG/1
TABLET ORAL
Qty: 60 TABLET | Refills: 3 | Status: SHIPPED | OUTPATIENT
Start: 2020-05-11 | End: 2020-08-04

## 2020-05-11 ASSESSMENT — ENCOUNTER SYMPTOMS
GASTROINTESTINAL NEGATIVE: 1
BACK PAIN: 1
SHORTNESS OF BREATH: 1
EYES NEGATIVE: 1
ALLERGIC/IMMUNOLOGIC NEGATIVE: 1
COUGH: 1

## 2020-05-11 NOTE — PROGRESS NOTES
Subjective:      Patient ID: Kody Silver is a 60 y.o. female. HPI  61 yo wf with low back pain. She reports that in the end of February she hurt her back . She was doing laundry sitting down thereafter in her chair and on getting back up she felt a severe painful pop in low back being in agony . The pop was in upper low back of aching component of grade 10 over 10 unable to stand because it hurt so bad . There has been continuous low back pain since this happened with pain ranging from grade 8 to 9 over 10 with activity making pain worse . Pain will radiate down her thighs to her knees . Pain will be aggravated with activity such as bending , prolongued standing , doing dishes along with walking distances over 30 feet . She has been motrin 1200 mg po q 6 hours which does not work . Movement or activity will make her low back pop with extreme severe pain in low back. She has trouble sleeping at night with pain . She has not had PT . There is no leg weakness or numbness . There is bladder disturbance with incontinence which improved with botox through urology . She has memory loss felt to have dementia with prior PET scan 10 years ago having seen neurologist in the past . She reports she needs to stay in familiar surroundings getting lost . She will repeat herself having trouble recognizing faces . She has history of bipolar disorder  . Testing CT lumbar spine mild disc bulges L3-4, L4-5 with mild central canal stenosis at L4-5 level from bulging and hypertrophy ligamentum flavum.  Mild neural foraminal stenosis at L4-5 level , April 2020      Past Medical History:   Diagnosis Date    [facility]'s [facility] [address]     Asthma     Bipolar 2 disorder, major depressive episode ([facility] [address]     COPD (chronic obstructive pulmonary disease) ([facility] [address]     Depression     Diabetes mellitus (HCC)     Fibromyalgia     Gastric ulcer     Hyperlipidemia     Hypertension     Hypothyroidism 3/16/2018    Mild intermittent [Budesonide-Formoterol Fumarate] Other (See Comments)     Chest pain    Aricept [Donepezil Hydrochloride] Nausea And Vomiting    Donepezil Hcl Nausea Only    Meloxicam Rash    Morphine Other (See Comments)     headaches       Review of Systems   Constitutional: Negative. HENT: Positive for hearing loss. Eyes: Negative. Respiratory: Positive for cough and shortness of breath. Cardiovascular: Negative. Gastrointestinal: Negative. Endocrine: Negative. Genitourinary: Positive for frequency and urgency. Musculoskeletal: Positive for back pain. Allergic/Immunologic: Negative. Neurological: Positive for headaches. Hematological: Negative. Psychiatric/Behavioral: Positive for dysphoric mood. The patient is nervous/anxious. Objective:   Physical Exam  Vitals:    05/11/20 1546   BP: (!) 146/89   Pulse: 91   Temp: 98.3 °F (36.8 °C)     weight: 226 lb (102.5 kg)    Neurological Examination  Constitutional .General exam well groomed   Head/Ears /Nose/Throat: external ear . Normal exam  Neck and thyroid . Normal size. No bruits  Respiratory . Breathsounds clear bilaterally  Cardiovascular: Auscultation of heart with regular rate and rhythm  Musculoskeletal. Muscle bulk and tone normal                                                           Muscle strength 5/5 strength throughout                                                                               No dysmetria or dysdiadokinesis  No tremor   Normal fine motor  Gait normal   Orientation Alert and oriented x 3 . Monday May 11, 2020. WORLD able to spell forwards not backwards . Serial 7 unable  Attention and concentration normal  Short term memory 1 word out of 3 in one minute   Language process and speech normal . No aphasia   Cranial nerve 2 normal acuety and visual fields  Cranial nerve 3, 4 and 6 . Extraocular muscles are intact . Pupils are equal and reactive   Cranial nerve 5 . Intact corneal reflex.  Normal facial

## 2020-05-19 ENCOUNTER — TELEPHONE (OUTPATIENT)
Dept: NEUROLOGY | Age: 65
End: 2020-05-19

## 2020-05-29 NOTE — TELEPHONE ENCOUNTER
Magalie Puri called the office back on Wednesday and wanted to know If Dr. Lawrence Ascencio could prescribe something else for the pain. This was discussed with the doctor. At this time Dr. Lawrence Ascencio stated that he would not prescribe any pain medication, that he wanted her to continue using the Neurontin and Flexeril. Call placed back to [facility] this morning and this information was given. Patient verbally stated her understanding.

## 2020-06-24 RX ORDER — DULOXETIN HYDROCHLORIDE 60 MG/1
CAPSULE, DELAYED RELEASE ORAL
Qty: 30 CAPSULE | Refills: 4 | Status: SHIPPED | OUTPATIENT
Start: 2020-06-24 | End: 2020-12-06 | Stop reason: SDUPTHER

## 2020-07-01 RX ORDER — FAMOTIDINE 40 MG/1
TABLET, FILM COATED ORAL
Qty: 30 TABLET | Refills: 1 | Status: SHIPPED | OUTPATIENT
Start: 2020-07-01 | End: 2020-08-04

## 2020-07-15 ENCOUNTER — OFFICE VISIT (OUTPATIENT)
Dept: NEUROLOGY | Age: 65
End: 2020-07-15
Payer: COMMERCIAL

## 2020-07-15 VITALS
WEIGHT: 226 LBS | HEIGHT: 67 IN | BODY MASS INDEX: 35.47 KG/M2 | HEART RATE: 96 BPM | SYSTOLIC BLOOD PRESSURE: 132 MMHG | TEMPERATURE: 97.1 F | DIASTOLIC BLOOD PRESSURE: 85 MMHG

## 2020-07-15 PROCEDURE — 99214 OFFICE O/P EST MOD 30 MIN: CPT | Performed by: PSYCHIATRY & NEUROLOGY

## 2020-07-15 RX ORDER — AMITRIPTYLINE HYDROCHLORIDE 25 MG/1
TABLET, FILM COATED ORAL
Qty: 60 TABLET | Refills: 3 | Status: SHIPPED | OUTPATIENT
Start: 2020-07-15 | End: 2020-09-08 | Stop reason: ALTCHOICE

## 2020-07-15 RX ORDER — TRAZODONE HYDROCHLORIDE 50 MG/1
50 TABLET ORAL NIGHTLY
COMMUNITY
Start: 2020-06-24

## 2020-07-15 RX ORDER — IBUPROFEN 800 MG/1
TABLET ORAL
Qty: 90 TABLET | Refills: 3 | Status: SHIPPED | OUTPATIENT
Start: 2020-07-15 | End: 2020-09-08 | Stop reason: ALTCHOICE

## 2020-07-15 ASSESSMENT — ENCOUNTER SYMPTOMS
ALLERGIC/IMMUNOLOGIC NEGATIVE: 1
COUGH: 1
BACK PAIN: 1
EYES NEGATIVE: 1
GASTROINTESTINAL NEGATIVE: 1
SHORTNESS OF BREATH: 1

## 2020-08-04 ENCOUNTER — OFFICE VISIT (OUTPATIENT)
Dept: FAMILY MEDICINE CLINIC | Age: 65
End: 2020-08-04
Payer: COMMERCIAL

## 2020-08-04 VITALS
BODY MASS INDEX: 37.83 KG/M2 | OXYGEN SATURATION: 98 % | WEIGHT: 241 LBS | SYSTOLIC BLOOD PRESSURE: 166 MMHG | HEIGHT: 67 IN | DIASTOLIC BLOOD PRESSURE: 84 MMHG | HEART RATE: 90 BPM

## 2020-08-04 PROCEDURE — G0438 PPPS, INITIAL VISIT: HCPCS | Performed by: INTERNAL MEDICINE

## 2020-08-04 RX ORDER — LISINOPRIL 20 MG/1
20 TABLET ORAL DAILY
Qty: 30 TABLET | Refills: 5 | Status: SHIPPED | OUTPATIENT
Start: 2020-08-04 | End: 2021-02-01

## 2020-08-04 ASSESSMENT — LIFESTYLE VARIABLES: HOW OFTEN DO YOU HAVE A DRINK CONTAINING ALCOHOL: 0

## 2020-08-04 ASSESSMENT — PATIENT HEALTH QUESTIONNAIRE - PHQ9: SUM OF ALL RESPONSES TO PHQ QUESTIONS 1-9: 23

## 2020-08-04 NOTE — PROGRESS NOTES
Medicare Annual Wellness Visit - Initial    Name: Lang Alanis Date: 2020   MRN: [id number] Sex: Female   Age: 60 y.o. Ethnicity: Non-/Non    : 1955 Race: White      Pa Arm is here for   Chief Complaint   Patient presents with   [address] for behavioral, psychosocial and functional/safety risks, and cognitive dysfunction are all negative except as indicated below. These results, as well as other patient data from the [address] form, are documented in Flowsheets linked to this Encounter. Allergies   Allergen Reactions    Latex Rash    Biaxin [Clarithromycin] Hives    Dicloxacillin Hives    Pcn [Penicillins] Hives    Zithromax [Azithromycin] Hives    Amoxicillin     Exelon [Rivastigmine Tartrate]     Lithium     Lyrica [Pregabalin]     Magnesium Trisilicate     Magnesium-Containing Compounds     Naproxen      rash    Nystatin     Prednisone     Rivastigmine Tartrate     Seroquel [Quetiapine Fumarate] Other (See Comments)     Excess weight gain     Symbicort [Budesonide-Formoterol Fumarate] Other (See Comments)     Chest pain    Aricept [Donepezil Hydrochloride] Nausea And Vomiting    Donepezil Hcl Nausea Only    Meloxicam Rash    Morphine Other (See Comments)     headaches       Prior to Visit Medications    Medication Sig Taking? Authorizing Provider   traZODone (DESYREL) 50 MG tablet Take 50 mg by mouth nightly Add to the 150 mg taken at night.  Yes Historical Provider, MD   ibuprofen (ADVIL;MOTRIN) 800 MG tablet Take 1 po tid Yes Melody Holloway MD   amitriptyline (ELAVIL) 25 MG tablet Take 1po qhs x 2 days then 2 po qhs Yes Melody Holloway MD   DULoxetine (CYMBALTA) 60 MG extended release capsule TAKE ONE CAPSULE BY MOUTH DAILY Yes Laura Nicholas PA-C   lovastatin (MEVACOR) 40 MG tablet TAKE ONE TABLET BY MOUTH ONCE NIGHTLY Yes RASHI Almonte - CNP   levothyroxine (SYNTHROID) 150 MCG tablet TAKE ONE TABLET BY MOUTH DAILY Yes RASHI Matos CNP   ARIPiprazole (ABILIFY) 5 MG tablet Take 5 mg by mouth daily Yes Historical Provider, MD   divalproex (DEPAKOTE ER) 250 MG extended release tablet One in morning, 2 at night Yes Historical Provider, MD   traZODone (DESYREL) 150 MG tablet Take 150 mg by mouth 2 tablets at bedtime Yes Historical Provider, MD       Past Medical History:   Diagnosis Date    [facility]     Asthma     Bipolar 2 disorder, major depressive episode ([facility]     COPD (chronic obstructive pulmonary disease) [facility]     Depression     Diabetes mellitus ([facility]     Fibromyalgia     Gastric ulcer     Hyperlipidemia     Hypertension     Hypothyroidism 3/16/2018    Mild intermittent asthma without complication 0/31/8739    NASIR (obstructive sleep apnea)     Osteoporosis     Peripheral neuropathy     Schizophrenia ([facility]     Seizures ([facility]     pseudo seizures    Thyroid disease     hypothyroidism       Past Surgical History:   Procedure Laterality Date    APPENDECTOMY      CHOLECYSTECTOMY      FRACTURE SURGERY      left ankle    HYSTERECTOMY      polyps    HYSTERECTOMY, TOTAL ABDOMINAL      INSERTABLE CARDIAC MONITOR Left 01/10/2018       Family History   Problem Relation Age of Onset    Cancer Mother         skin    Heart Disease Mother     Thyroid Disease Mother     Stroke Mother     Heart Disease Father     High Blood Pressure Father     Stroke Father     Other Father         alzhemier's        CareTeam (Including outside providers/suppliers regularly involved in providing care):   Patient Care Team:  Rebecca Moses DO as PCP - General (Family Medicine)  Rebecca Moses DO as PCP - REHABILITATION [facility] Empaneled Provider    Wt Readings from Last 3 Encounters:   08/04/20 241 lb (109.3 kg)   07/15/20 226 lb (102.5 kg)   05/11/20 226 lb (102.5 kg)     Vitals:    08/04/20 1455 08/04/20 1457 08/04/20 1501   BP: (!) 175/105 (!) 180/107 (!) 166/84   Pulse: 90 SpO2: 98%     Weight: 241 lb (109.3 kg)     Height: 5' 7\" (1.702 m)           The following problems were reviewed today and where indicated follow up appointments were made and/or referrals ordered. Risk Factor Screenings with Interventions     Fall Risk:  Timed Up and Go Test > 12 seconds?  (Complete if either Fall Risk answers are Yes): no  2 or more falls in past year?: (!) yes  Fall with injury in past year?: no  Fall Risk Interventions:    · Home safety tips provided  · Home exercises provided to promote strength and balance  · Physical therapy referral ordered for strength and balance training    Depression:  PHQ-2 Score: 6  Depression Interventions:  · patient sees psychiatry , dr Kimberly Evans, he just adjusted her meds, is depressed about her back as well and not getting any help with it     Anxiety:     Anxiety Interventions:  · see depression above     Cognitive:  Clock Drawing Test (CDT) Score: Normal  Cognitive Impairment Interventions:  · NA    Substance Abuse:  Social History     Socioeconomic History    Marital status:      Spouse name: Not on file    Number of children: Not on file    Years of education: Not on file    Highest education level: Not on file   Occupational History    Not on file   Social Needs    Financial resource strain: Not on file    Food insecurity     Worry: Not on file     Inability: Not on file    Transportation needs     Medical: Not on file     Non-medical: Not on file   Tobacco Use    Smoking status: Current Every Day Smoker     Packs/day: 0.50     Years: 50.00     Pack years: 25.00     Types: Cigarettes    Smokeless tobacco: Never Used   Substance and Sexual Activity    Alcohol use: No     Alcohol/week: 0.0 standard drinks    Drug use: No    Sexual activity: Not Currently   Lifestyle    Physical activity     Days per week: Not on file     Minutes per session: Not on file    Stress: Not on file   Relationships    Social connections     Talks on phone: Not on file     Gets together: Not on file     Attends Scientologist service: Not on file     Active member of club or organization: Not on file     Attends meetings of clubs or organizations: Not on file     Relationship status: Not on file    Intimate partner violence     Fear of current or ex partner: Not on file     Emotionally abused: Not on file     Physically abused: Not on file     Forced sexual activity: Not on file   Other Topics Concern    Not on file   Social History Narrative    Not on file     Audit Questionnaire: Screen for Alcohol Misuse  How often do you have a drink containing alcohol?: Never  Substance Abuse Interventions:  · NA    Health Risk Assessment:     General  In general, how would you say your health is?: Fair  In the past 7 days, have you experienced any of the following? New or Increased Pain, New or Increased Fatigue, Loneliness, Social Isolation, Stress or Anger?: (!) New or Increased Pain, New or Increased Fatigue, Loneliness, Stress  Do you get the social and emotional support that you need?: (!) No  Do you have a Living Will?: Yes  General Health Risk Interventions:  · Pain issues: pain management referral ordered, physical therapy referral ordered  · Fatigue: due to her psych meds peratient     Health Habits/Nutrition  Do you exercise for at least 20 minutes 2-3 times per week?: Yes  Have you lost any weight without trying in the past 3 months?: No  Do you eat fewer than 2 meals per day?: (!) Yes  Have you seen a dentist within the past year?: (!) No  Body mass index is 37.75 kg/m².   Health Habits/Nutrition Interventions:  · Dental exam overdue:  patient declines dental evaluation    Hearing/Vision  Do you or your family notice any trouble with your hearing?: (!) Yes  Do you have difficulty driving, watching TV, or doing any of your daily activities because of your eyesight?: (!) Yes  Have you had an eye exam within the past year?: (!) No  Hearing/Vision Interventions:  · Hearing concerns:  cannot afford hearing aids but needs them   · Vision concerns:  does see optometry but placed referral to opthamology     Safety  Do you have working smoke detectors?: Yes  Have all throw rugs been removed or fastened?: (!) No  Do you have non-slip mats or surfaces in all bathtubs/showers?: (!) No  Do all of your stairways have a railing or banister?: Yes  Are your doorways, halls and stairs free of clutter?: Yes  Do you always fasten your seatbelt when you are in a car?: Yes  Safety Interventions:  · Home safety tips provided    ADLs  In the past 7 days, did you need help from others to perform any of the following everyday activities? Eating, dressing, grooming, bathing, toileting, or walking/balance?: None  In the past 7 days, did you need help from others to take care of any of the following?  Laundry, housekeeping, banking/finances, shopping, telephone use, food preparation, transportation, or taking medications?: None  ADL Interventions:  · Referred for Care Coordination  · Referred to [place] on Aging    Personalized Preventive Plan   Current Health Maintenance Status  Immunization History   Administered Date(s) Administered    Pneumococcal Conjugate 13-valent ([id number]) 12/16/2013    Pneumococcal Polysaccharide ([id number]) 11/23/2011, 10/08/2016        Health Maintenance   Topic Date Due    Shingles Vaccine (1 of 2) 11/19/2007    Annual Wellness Visit (AWV)  05/29/2019    Breast cancer screen  08/18/2019    Lipid screen  03/25/2020    Colon cancer screen colonoscopy  09/02/2020    DTaP/Tdap/Td vaccine (1 - Tdap) 11/12/2020 (Originally 11/19/1976)    Flu vaccine (1) 09/01/2020    TSH testing  11/21/2020    A1C test (Diabetic or Prediabetic)  12/30/2020    Pneumococcal 0-64 years Vaccine  Completed    Hepatitis C screen  Completed    HIV screen  Completed    Hepatitis A vaccine  Aged Out    Hepatitis B vaccine  Aged Out    Hib vaccine  Aged Out    Meningococcal (ACWY) vaccine Aged Out     Start bp medication due to high bp   Also placed referrals to pain specialist and neuro surgery in regards to ct from the spring and [facility]   Also referral to optho due to hx of cataracts and worsening   F.u in 4 weeks for bp check   Cardiology  Seek immediate medical attention for any chest pain , severe trouble breathing and/or visual changes. Recommendations for Preventive Services Due: see orders.   Recommended screening schedule for the next 5-10 years is provided to the patient in written form: see Patient Instructions/AVS.

## 2020-08-07 ENCOUNTER — TELEPHONE (OUTPATIENT)
Dept: ADMINISTRATIVE | Age: 65
End: 2020-08-07

## 2020-08-07 ENCOUNTER — TELEPHONE (OUTPATIENT)
Dept: FAMILY MEDICINE CLINIC | Age: 65
End: 2020-08-07

## 2020-08-10 ENCOUNTER — TELEPHONE (OUTPATIENT)
Dept: FAMILY MEDICINE CLINIC | Age: 65
End: 2020-08-10

## 2020-08-10 DIAGNOSIS — M48.062 SPINAL STENOSIS OF LUMBAR REGION WITH NEUROGENIC CLAUDICATION: ICD-10-CM

## 2020-08-10 DIAGNOSIS — M48.9 SPINAL DISEASE: ICD-10-CM

## 2020-08-10 DIAGNOSIS — M54.41 CHRONIC BILATERAL LOW BACK PAIN WITH BILATERAL SCIATICA: Primary | ICD-10-CM

## 2020-08-10 DIAGNOSIS — M54.42 CHRONIC BILATERAL LOW BACK PAIN WITH BILATERAL SCIATICA: Primary | ICD-10-CM

## 2020-08-10 DIAGNOSIS — G89.29 CHRONIC BILATERAL LOW BACK PAIN WITH BILATERAL SCIATICA: Primary | ICD-10-CM

## 2020-08-12 ENCOUNTER — TELEPHONE (OUTPATIENT)
Dept: ADMINISTRATIVE | Age: 65
End: 2020-08-12

## 2020-09-08 ENCOUNTER — OFFICE VISIT (OUTPATIENT)
Dept: FAMILY MEDICINE CLINIC | Age: 65
End: 2020-09-08
Payer: COMMERCIAL

## 2020-09-08 VITALS
SYSTOLIC BLOOD PRESSURE: 122 MMHG | DIASTOLIC BLOOD PRESSURE: 82 MMHG | BODY MASS INDEX: 37.98 KG/M2 | HEART RATE: 95 BPM | HEIGHT: 67 IN | OXYGEN SATURATION: 95 % | TEMPERATURE: 96.5 F | RESPIRATION RATE: 18 BRPM | WEIGHT: 242 LBS

## 2020-09-08 PROCEDURE — 99213 OFFICE O/P EST LOW 20 MIN: CPT | Performed by: INTERNAL MEDICINE

## 2020-09-08 ASSESSMENT — ENCOUNTER SYMPTOMS
COUGH: 0
CHEST TIGHTNESS: 0
BACK PAIN: 1
SHORTNESS OF BREATH: 0
BLURRED VISION: 0
CONSTIPATION: 0
DIARRHEA: 0
ANAL BLEEDING: 0
ORTHOPNEA: 0
WHEEZING: 0
ABDOMINAL PAIN: 0
STRIDOR: 0
CHOKING: 0
BLOOD IN STOOL: 0

## 2020-09-08 NOTE — PROGRESS NOTES
[facility]  [address] [facility] [address] [address]  Dept: [phone]  Dept Fax: [phone]    Sarah Ny a 60 y.o. female who presents today for her medical conditions/complaints as notedbelow. Sarah Ny is c/o of   Chief Complaint   Patient presents with    1 Month Follow-Up     htn check, wants to go over what back surgeon said   [facility]       HPI:     Here for f/u   bp much improved/normal   No cardiac sxs    Went to neuro sx   They did ct myelogram found bulging discs  We do not have report since it was done at [facility]   Do have two consult notes  They recommended fusion/surgery   Did not want to do discectomy and pt refusing surgery at this time   Has f/u up with pain management next Monday to discuss ct results as they wanted to wait to see these as well   On elavil from neuro in past and not helping at all with pain   Motrin 800 mg does not help at all either     Hypertension   This is a recurrent problem. The current episode started more than 1 month ago. The problem has been gradually improving since onset. The problem is controlled. Associated symptoms include anxiety and malaise/fatigue. Pertinent negatives include no blurred vision, chest pain, headaches, neck pain, orthopnea, palpitations, peripheral edema, PND, shortness of breath or sweats. Agents associated with hypertension include thyroid hormones. Risk factors for coronary artery disease include family history, dyslipidemia, obesity, post-menopausal state, stress and sedentary lifestyle. Past treatments include ACE inhibitors. The current treatment provides moderate improvement. Compliance problems include diet and exercise. There is no history of angina, kidney disease, CAD/MI, CVA or heart failure. Identifiable causes of hypertension include a thyroid problem.  There is no history of coarctation of the aorta or hyperaldosteronism. Hemoglobin A1C (%)   Date Value   12/30/2019 5.9   12/21/2018 5.8   07/13/2017 5.8         ( goal A1C is < 7)   Microalb/Crt.  Ratio (mcg/mg creat)   Date Value   07/13/2017 25 (H)     LDL Cholesterol (mg/dL)   Date Value   03/25/2019 80   12/21/2018 156 (H)   01/15/2014 93     LDL Calculated (mg/dL)   Date Value   06/16/2016 70   12/16/2015 124   02/11/2015 57       (goal LDL is <100)   AST (U/L)   Date Value   12/21/2018 10     ALT (U/L)   Date Value   12/21/2018 10     BUN (mg/dL)   Date Value   11/12/2019 13     BP Readings from Last 3 Encounters:   09/08/20 122/82   08/04/20 (!) 166/84   07/15/20 132/85          (goal 120/80)    Past Medical History:   Diagnosis Date    [facility]     Asthma     Bipolar 2 disorder, major depressive episode ([facility]     COPD (chronic obstructive pulmonary disease) ([facility]     Depression     Diabetes mellitus ([facility]     Fibromyalgia     Gastric ulcer     Hyperlipidemia     Hypertension     Hypothyroidism 3/16/2018    Mild intermittent asthma without complication 1/92/9409    NASIR (obstructive sleep apnea)     Osteoporosis     Peripheral neuropathy     Schizophrenia (HCC)     Seizures (HCC)     pseudo seizures    Thyroid disease     hypothyroidism      Past Surgical History:   Procedure Laterality Date    APPENDECTOMY      CHOLECYSTECTOMY      FRACTURE SURGERY      left ankle    HYSTERECTOMY      polyps    HYSTERECTOMY, TOTAL ABDOMINAL      INSERTABLE CARDIAC MONITOR Left 01/10/2018       Family History   Problem Relation Age of Onset    Cancer Mother         skin    Heart Disease Mother     Thyroid Disease Mother     Stroke Mother     Heart Disease Father     High Blood Pressure Father     Stroke Father     Other Father         alzhemier's        Social History     Tobacco Use    Smoking status: Current Every Day Smoker     Packs/day: 0.50     Years: 50.00     Pack years: 25.00     Types: Cigarettes    Smokeless tobacco: Never Used   Substance Use Topics    Alcohol use: No     Alcohol/week: 0.0 standard drinks      Current Outpatient Medications   Medication Sig Dispense Refill    lisinopril (PRINIVIL;ZESTRIL) 20 MG tablet Take 1 tablet by mouth daily 30 tablet 5    traZODone (DESYREL) 50 MG tablet Take 50 mg by mouth nightly Add to the 150 mg taken at night.  DULoxetine (CYMBALTA) 60 MG extended release capsule TAKE ONE CAPSULE BY MOUTH DAILY 30 capsule 4    lovastatin (MEVACOR) 40 MG tablet TAKE ONE TABLET BY MOUTH ONCE NIGHTLY 30 tablet 10    levothyroxine (SYNTHROID) 150 MCG tablet TAKE ONE TABLET BY MOUTH DAILY 30 tablet 10    ARIPiprazole (ABILIFY) 5 MG tablet Take 5 mg by mouth daily      divalproex (DEPAKOTE ER) 250 MG extended release tablet One in morning, 2 at night      traZODone (DESYREL) 150 MG tablet Take 150 mg by mouth 2 tablets at bedtime       No current facility-administered medications for this visit.       Allergies   Allergen Reactions    Latex Rash    Biaxin [Clarithromycin] Hives    Dicloxacillin Hives    Pcn [Penicillins] Hives    Zithromax [Azithromycin] Hives    Amoxicillin     Exelon [Rivastigmine Tartrate]     Lithium     Lyrica [Pregabalin]     Magnesium Trisilicate     Magnesium-Containing Compounds     Naproxen      rash    Nystatin     Prednisone     Rivastigmine Tartrate     Seroquel [Quetiapine Fumarate] Other (See Comments)     Excess weight gain     Symbicort [Budesonide-Formoterol Fumarate] Other (See Comments)     Chest pain    Aricept [Donepezil Hydrochloride] Nausea And Vomiting    Donepezil Hcl Nausea Only    Meloxicam Rash    Morphine Other (See Comments)     headaches          Health Maintenance   Topic Date Due    Shingles Vaccine (1 of 2) 11/19/2007    Breast cancer screen  08/18/2019    Lipid screen  03/25/2020    Flu vaccine (1) 09/01/2020    Colon cancer screen colonoscopy  09/02/2020    DTaP/Tdap/Td vaccine (1 - Tdap) 11/12/2020 oriented to person, place, and time. Cranial Nerves: No cranial nerve deficit. Psychiatric:         Mood and Affect: Mood normal.         Thought Content: Thought content normal.         Judgment: Judgment normal.       /82   Pulse 95   Temp 96.5 °F (35.8 °C) (Tympanic)   Resp 18   Ht 5' 7\" (1.702 m)   Wt 242 lb (109.8 kg)   SpO2 95%   BMI 37.90 kg/m²     Assessment:       Diagnosis Orders   1. Chronic bilateral low back pain with bilateral sciatica     2. Spinal disease     3. Spinal stenosis of lumbar region with neurogenic claudication     4. Essential hypertension     5. Screening for colon cancer  Cologuard             Plan:       Return in about 4 months (around 1/8/2021), or if symptoms worsen or fail to improve, for bp check, med refill. Orders Placed This Encounter   Procedures    Cologuard     This test is performed by an external laboratory and is used for result attachment only. It is required that this order requisition be faxed to: [facility] @@ [phone]. See [email or web address] for further information. No orders of the defined types were placed in this encounter. follow up with specialists as scheduled  Up dated HM  Continue lisinopril as tolerated and bp imrpved/normal range  Seek immediate medical attention for any chest pain , severe trouble breathing and/or visual changes. F/u with specialists as scheduled. Consider second opinion as well if pain management rae not work out for another neuro surgeon   Call with q/c  F/u in 4-5 months    Patientgiven educational materials - see patient instructions. Discussed use, benefit,and side effects of prescribed medications. All patient questions answered. Ptvoiced understanding. Reviewed health maintenance. Instructed to continue currentmedications, diet and exercise. Patient agreed with treatment plan. Follow up asdirected.      Electronically signed by Carla Cintron DO on 9/8/2020 at 2:47 PM

## 2020-09-29 ENCOUNTER — OFFICE VISIT (OUTPATIENT)
Dept: FAMILY MEDICINE CLINIC | Age: 65
End: 2020-09-29
Payer: COMMERCIAL

## 2020-09-29 VITALS
RESPIRATION RATE: 18 BRPM | TEMPERATURE: 99 F | DIASTOLIC BLOOD PRESSURE: 72 MMHG | BODY MASS INDEX: 37.67 KG/M2 | WEIGHT: 240 LBS | HEART RATE: 108 BPM | HEIGHT: 67 IN | SYSTOLIC BLOOD PRESSURE: 118 MMHG | OXYGEN SATURATION: 95 %

## 2020-09-29 LAB
ALBUMIN SERPL-MCNC: 4 G/DL
ALP BLD-CCNC: 58 U/L
ALT SERPL-CCNC: 18 U/L
ANION GAP SERPL CALCULATED.3IONS-SCNC: 8 MMOL/L
AST SERPL-CCNC: 15 U/L
BASOPHILS ABSOLUTE: 0.1 /ΜL
BASOPHILS RELATIVE PERCENT: 0.7 %
BILIRUB SERPL-MCNC: 0.4 MG/DL (ref 0.1–1.4)
BUN BLDV-MCNC: 14 MG/DL
CALCIUM SERPL-MCNC: 9.4 MG/DL
CHLORIDE BLD-SCNC: 97 MMOL/L
CO2: 29 MMOL/L
CREAT SERPL-MCNC: 0.84 MG/DL
EOSINOPHILS ABSOLUTE: 0.1 /ΜL
EOSINOPHILS RELATIVE PERCENT: 1.4 %
GFR CALCULATED: ABNORMAL
GLUCOSE BLD-MCNC: 118 MG/DL
HCT VFR BLD CALC: 41.1 % (ref 36–46)
HEMOGLOBIN: 13.9 G/DL (ref 12–16)
LYMPHOCYTES ABSOLUTE: 1.6 /ΜL
LYMPHOCYTES RELATIVE PERCENT: 15.2 %
MCH RBC QN AUTO: 29.4 PG
MCHC RBC AUTO-ENTMCNC: 33.9 G/DL
MCV RBC AUTO: 87 FL
MONOCYTES ABSOLUTE: 0.8 /ΜL
MONOCYTES RELATIVE PERCENT: 7.7 %
NEUTROPHILS ABSOLUTE: 7.7 /ΜL
NEUTROPHILS RELATIVE PERCENT: 75 %
PDW BLD-RTO: 13.5 %
PLATELET # BLD: 227 K/ΜL
PMV BLD AUTO: 7.5 FL
POTASSIUM SERPL-SCNC: 4.7 MMOL/L
RBC # BLD: 4.73 10^6/ΜL
SODIUM BLD-SCNC: 134 MMOL/L
TOTAL PROTEIN: 7
TSH SERPL DL<=0.05 MIU/L-ACNC: 1.16 UIU/ML
WBC # BLD: 10.2 10^3/ML

## 2020-09-29 PROCEDURE — 99213 OFFICE O/P EST LOW 20 MIN: CPT | Performed by: INTERNAL MEDICINE

## 2020-09-29 RX ORDER — FLUCONAZOLE 100 MG/1
100 TABLET ORAL DAILY
Qty: 10 TABLET | Refills: 0 | Status: SHIPPED | OUTPATIENT
Start: 2020-09-29 | End: 2020-10-09

## 2020-09-29 RX ORDER — AMITRIPTYLINE HYDROCHLORIDE 50 MG/1
50 TABLET, FILM COATED ORAL NIGHTLY
COMMUNITY
End: 2020-09-29 | Stop reason: ALTCHOICE

## 2020-09-29 RX ORDER — NYSTATIN 100000 [USP'U]/G
POWDER TOPICAL
Qty: 60 G | Refills: 5 | Status: SHIPPED | OUTPATIENT
Start: 2020-09-29 | End: 2020-10-13 | Stop reason: SDUPTHER

## 2020-09-29 ASSESSMENT — ENCOUNTER SYMPTOMS
COLOR CHANGE: 1
DIARRHEA: 0
CONSTIPATION: 0
CHOKING: 0
VOICE CHANGE: 0
ABDOMINAL PAIN: 0
WHEEZING: 0
STRIDOR: 0
CHEST TIGHTNESS: 0
RECTAL PAIN: 0
COUGH: 0
VOMITING: 0
BLOOD IN STOOL: 0
TROUBLE SWALLOWING: 0
BACK PAIN: 1
SHORTNESS OF BREATH: 0
NAUSEA: 1
SORE THROAT: 0
RHINORRHEA: 0
NAIL CHANGES: 0
ABDOMINAL DISTENTION: 0
EYE PAIN: 0
ANAL BLEEDING: 0

## 2020-09-29 NOTE — PROGRESS NOTES
Visit Information    Have you changed or started any medications since your last visit including any over-the-counter medicines, vitamins, or herbal medicines? no   Are you having any side effects from any of your medications? -  no  Have you stopped taking any of your medications? Is so, why? -  no    Have you seen any other physician or provider since your last visit? No  Have you had any other diagnostic tests since your last visit? No  Have you been seen in the emergency room and/or had an admission to a hospital since we last saw you? No  Have you had your routine dental cleaning in the past 6 months? no    Have you activated your [facility] account? If not, what are your barriers?  Yes     Patient Care Team:  Angeline Otto DO as PCP - General (Family Medicine)  [facility] DO [facility]    Medical History Review  Past Medical, Family, and Social History reviewed and does contribute to the patient presenting condition    Health Maintenance   Topic Date Due    Shingles Vaccine (1 of 2) 11/19/2007    Breast cancer screen  08/18/2019    Lipid screen  03/25/2020    Flu vaccine (1) 09/01/2020    Colon cancer screen colonoscopy  09/02/2020    DTaP/Tdap/Td vaccine (1 - Tdap) 11/12/2020 (Originally 11/19/1976)    Potassium monitoring  11/12/2020    Creatinine monitoring  11/12/2020    TSH testing  11/21/2020    A1C test (Diabetic or Prediabetic)  12/30/2020    Statin Therapy  04/13/2021    Annual Wellness Visit (AWV)  08/05/2021    Pneumococcal 0-64 years Vaccine  Completed    Hepatitis C screen  Completed    HIV screen  Completed    Hepatitis A vaccine  Aged Out    Hepatitis B vaccine  Aged Out    Hib vaccine  Aged Out    Meningococcal (ACWY) vaccine  Aged Out

## 2020-09-29 NOTE — PROGRESS NOTES
[facility]  [address] [address]  Dept: [phone]  Dept Fax: [phone]    Mike Arteaga a 60 y.o. female who presents today for her medical conditions/complaints as notedbelow. Mike Arteaga is c/o of   Chief Complaint   Patient presents with    Medication Problem     rash, possible med reaction, rash on thighs     Nausea         HPI:     Patient here due to rash   pharmacist told her due to rash and some nausea she had serotonin syndrome due to being on multiple psych and neuro meds  Neuro had her on elavil , cymbalta and trazadone and psych had her on abilify and depakote  No muscle rigidity   No f/c   bp normal today in office   No tremors     rash she has tried lotrisone without much change   Is very moist       Has had slight nausea but no vomiting  No abdominal pain   Using pepcid which helps   No confusion or ams either besides her baselin psych issues       Rash   This is a recurrent problem. The current episode started 1 to 4 weeks ago. The problem is unchanged. The affected locations include the groin and abdomen. The rash is characterized by itchiness, pain and redness. She was exposed to nothing. Pertinent negatives include no anorexia, congestion, cough, diarrhea, eye pain, facial edema, fatigue, fever, joint pain, nail changes, rhinorrhea, shortness of breath, sore throat or vomiting. Past treatments include anti-itch cream. The treatment provided mild relief. There is no history of allergies or asthma. Hemoglobin A1C (%)   Date Value   12/30/2019 5.9   12/21/2018 5.8   07/13/2017 5.8         ( goal A1C is < 7)   Microalb/Crt.  Ratio (mcg/mg creat)   Date Value   07/13/2017 25 (H)     LDL Cholesterol (mg/dL)   Date Value   03/25/2019 80   12/21/2018 156 (H)   01/15/2014 93     LDL Calculated (mg/dL)   Date Value   06/16/2016 70   12/16/2015 124   02/11/2015 57       (goal LDL is <100)   AST (U/L)   Date Value   12/21/2018 10     ALT (U/L)   Date Value   12/21/2018 10     BUN (mg/dL)   Date Value   11/12/2019 13     BP Readings from Last 3 Encounters:   09/29/20 118/72   09/08/20 122/82   08/04/20 (!) 166/84          (goal 120/80)    Past Medical History:   Diagnosis Date    Alzheimer's disease ([facility]     Asthma     Bipolar 2 disorder, major depressive episode ([facility]     COPD (chronic obstructive pulmonary disease) ([facility]     Depression     Diabetes mellitus ([facility]     Fibromyalgia     Gastric ulcer     Hyperlipidemia     Hypertension     Hypothyroidism 3/16/2018    Mild intermittent asthma without complication 7/40/6342    NASIR (obstructive sleep apnea)     Osteoporosis     Peripheral neuropathy     Schizophrenia (HCC)     Seizures (HCC)     pseudo seizures    Thyroid disease     hypothyroidism      Past Surgical History:   Procedure Laterality Date    APPENDECTOMY      CHOLECYSTECTOMY      FRACTURE SURGERY      left ankle    HYSTERECTOMY      polyps    HYSTERECTOMY, TOTAL ABDOMINAL      INSERTABLE CARDIAC MONITOR Left 01/10/2018       Family History   Problem Relation Age of Onset    Cancer Mother         skin    Heart Disease Mother     Thyroid Disease Mother     Stroke Mother     Heart Disease Father     High Blood Pressure Father     Stroke Father     Other Father         alzhemier's        Social History     Tobacco Use    Smoking status: Current Every Day Smoker     Packs/day: 0.50     Years: 50.00     Pack years: 25.00     Types: Cigarettes    Smokeless tobacco: Never Used   Substance Use Topics    Alcohol use: No     Alcohol/week: 0.0 standard drinks      Current Outpatient Medications   Medication Sig Dispense Refill    fluconazole (DIFLUCAN) 100 MG tablet Take 1 tablet by mouth daily for 10 days 10 tablet 0    nystatin (MYCOSTATIN) 664134 UNIT/GM powder Apply 3 times daily.  60 g 5    lisinopril (PRINIVIL;ZESTRIL) 20 MG tablet Take 1 tablet by mouth daily 30 tablet 5    traZODone (DESYREL) 50 MG tablet Take 50 mg by mouth nightly Add to the 150 mg taken at night.  DULoxetine (CYMBALTA) 60 MG extended release capsule TAKE ONE CAPSULE BY MOUTH DAILY 30 capsule 4    lovastatin (MEVACOR) 40 MG tablet TAKE ONE TABLET BY MOUTH ONCE NIGHTLY 30 tablet 10    levothyroxine (SYNTHROID) 150 MCG tablet TAKE ONE TABLET BY MOUTH DAILY 30 tablet 10    ARIPiprazole (ABILIFY) 5 MG tablet Take 5 mg by mouth daily      divalproex (DEPAKOTE ER) 250 MG extended release tablet One in morning, 2 at night      traZODone (DESYREL) 150 MG tablet Take 150 mg by mouth 2 tablets at bedtime       No current facility-administered medications for this visit.       Allergies   Allergen Reactions    Latex Rash    Biaxin [Clarithromycin] Hives    Dicloxacillin Hives    Pcn [Penicillins] Hives    Zithromax [Azithromycin] Hives    Amoxicillin     Exelon [Rivastigmine Tartrate]     Lithium     Lyrica [Pregabalin]     Magnesium Trisilicate     Magnesium-Containing Compounds     Naproxen      rash    Nystatin     Prednisone     Rivastigmine Tartrate     Seroquel [Quetiapine Fumarate] Other (See Comments)     Excess weight gain     Symbicort [Budesonide-Formoterol Fumarate] Other (See Comments)     Chest pain    Aricept [Donepezil Hydrochloride] Nausea And Vomiting    Donepezil Hcl Nausea Only    Meloxicam Rash    Morphine Other (See Comments)     headaches          Health Maintenance   Topic Date Due    Shingles Vaccine (1 of 2) 11/19/2007    Breast cancer screen  08/18/2019    Lipid screen  03/25/2020    Flu vaccine (1) 09/01/2020    Colon cancer screen colonoscopy  09/02/2020    DTaP/Tdap/Td vaccine (1 - Tdap) 11/12/2020 (Originally 11/19/1976)    Potassium monitoring  11/12/2020    Creatinine monitoring  11/12/2020    TSH testing  11/21/2020    A1C test (Diabetic or Prediabetic)  12/30/2020    Statin Therapy  04/13/2021    Annual Wellness Visit (AWV)  08/05/2021 neck supple. No neck rigidity. Thyroid: No thyroid mass or thyroid tenderness. Vascular: No carotid bruit. Cardiovascular:      Rate and Rhythm: Normal rate and regular rhythm. No extrasystoles are present. Pulses: Normal pulses. Heart sounds: Normal heart sounds, S1 normal and S2 normal. Heart sounds not distant. No murmur. Pulmonary:      Effort: Pulmonary effort is normal. No bradypnea, accessory muscle usage, prolonged expiration, respiratory distress or retractions. Breath sounds: Normal breath sounds and air entry. No stridor, decreased air movement or transmitted upper airway sounds. No decreased breath sounds, wheezing, rhonchi or rales. Abdominal:      General: Bowel sounds are normal. There is no distension. Palpations: Abdomen is soft. There is no hepatomegaly or splenomegaly. Tenderness: There is no abdominal tenderness. Musculoskeletal:      Right shoulder: She exhibits no tenderness, no bony tenderness, no pain, no spasm, normal pulse and normal strength. Left knee: She exhibits normal range of motion, no swelling, no effusion, no ecchymosis, no deformity, no laceration and no erythema. No tenderness found. Lumbar back: She exhibits spasm. She exhibits normal range of motion, no tenderness, no bony tenderness, no swelling, no edema, no deformity, no pain and normal pulse. Right lower leg: No edema. Left lower leg: No edema. Lymphadenopathy:      Cervical: No cervical adenopathy. Skin:     General: Skin is warm and dry. Findings: Rash present. Rash is macular. Neurological:      General: No focal deficit present. Mental Status: She is alert and oriented to person, place, and time. Cranial Nerves: Cranial nerves are intact. No cranial nerve deficit. Sensory: No sensory deficit. Motor: Motor function is intact. No weakness, atrophy or abnormal muscle tone. Coordination: Coordination is intact. Coordination normal.      Gait: Gait abnormal.   Psychiatric:         Mood and Affect: Mood normal.         Behavior: Behavior normal.         Thought Content: Thought content normal.         Judgment: Judgment normal.       /72 (Site: Left Upper Arm, Position: Sitting, Cuff Size: Medium Adult)   Pulse 108   Temp 99 °F (37.2 °C) (Tympanic)   Resp 18   Ht 5' 7\" (1.702 m)   Wt 240 lb (108.9 kg)   SpO2 95%   BMI 37.59 kg/m²     Assessment:       Diagnosis Orders   1. Candidiasis  YAN Screen with Reflex    Valproic Acid Level, Total   2. Fungal infection     3. Chronic bilateral low back pain with bilateral sciatica     4. Essential hypertension  Serotonin, Serum    CBC With Auto Differential    Comprehensive Metabolic Panel    TSH with Reflex    YAN Screen with Reflex   5. Nausea  Valproic Acid Level, Total             Plan:       Return in about 2 weeks (around 10/13/2020), or if symptoms worsen or fail to improve, for rash check . Orders Placed This Encounter   Procedures    Serotonin, Serum     Standing Status:   Future     Standing Expiration Date:   9/29/2021    CBC With Auto Differential     Standing Status:   Future     Standing Expiration Date:   9/29/2021    Comprehensive Metabolic Panel     Standing Status:   Future     Standing Expiration Date:   9/29/2021    TSH with Reflex     Standing Status:   Future     Standing Expiration Date:   9/29/2021    YAN Screen with Reflex     Standing Status:   Future     Standing Expiration Date:   9/29/2021    Valproic Acid Level, Total     Standing Status:   Future     Standing Expiration Date:   9/29/2021     Order Specific Question:   Dose Schedule & Time of Last Dose? Answer:   8 am 9/29/2020     Orders Placed This Encounter   Medications    fluconazole (DIFLUCAN) 100 MG tablet     Sig: Take 1 tablet by mouth daily for 10 days     Dispense:  10 tablet     Refill:  0    nystatin (MYCOSTATIN) 547956 UNIT/GM powder     Sig: Apply 3 times daily.

## 2020-10-13 ENCOUNTER — OFFICE VISIT (OUTPATIENT)
Dept: FAMILY MEDICINE CLINIC | Age: 65
End: 2020-10-13
Payer: COMMERCIAL

## 2020-10-13 VITALS
HEIGHT: 67 IN | HEART RATE: 91 BPM | TEMPERATURE: 97 F | DIASTOLIC BLOOD PRESSURE: 82 MMHG | OXYGEN SATURATION: 98 % | WEIGHT: 244 LBS | BODY MASS INDEX: 38.3 KG/M2 | RESPIRATION RATE: 18 BRPM | SYSTOLIC BLOOD PRESSURE: 136 MMHG

## 2020-10-13 PROCEDURE — 99213 OFFICE O/P EST LOW 20 MIN: CPT | Performed by: INTERNAL MEDICINE

## 2020-10-13 RX ORDER — NYSTATIN 100000 [USP'U]/G
POWDER TOPICAL
Qty: 60 G | Refills: 5 | Status: SHIPPED | OUTPATIENT
Start: 2020-10-13 | End: 2021-04-05

## 2020-10-13 RX ORDER — TERBINAFINE HYDROCHLORIDE 250 MG/1
250 TABLET ORAL DAILY
Qty: 14 TABLET | Refills: 0 | Status: SHIPPED | OUTPATIENT
Start: 2020-10-13 | End: 2020-10-27

## 2020-10-13 ASSESSMENT — ENCOUNTER SYMPTOMS
CHEST TIGHTNESS: 0
SORE THROAT: 0
CONSTIPATION: 0
STRIDOR: 0
SINUS PAIN: 0
RHINORRHEA: 0
WHEEZING: 0
VOICE CHANGE: 0
TROUBLE SWALLOWING: 0
SINUS PRESSURE: 0
ABDOMINAL PAIN: 0
COUGH: 0
COLOR CHANGE: 0
CHOKING: 0
DIARRHEA: 0
SHORTNESS OF BREATH: 0
VOMITING: 0

## 2020-10-13 NOTE — PROGRESS NOTES
[facility]  [address] [address]  Dept: [phone]  Dept Fax: [phone]    Sunita Oconnor a 60 y.o. female who presents today for her medical conditions/complaints as notedbelow. Sunita Oconnor is c/o of   Chief Complaint   Patient presents with    2 Week Follow-Up    Rash     states not getting any better. painful in groin       HPI:     Here for rash check   No better per patient   Finished the course of oral diflucan , doing the power 3 times a day   quite extensive rash underneath abdominal folds  No new sxs  Serotonin was very low and all other labs besided depakote level were normal     Rash   This is a recurrent problem. The current episode started 1 to 4 weeks ago. The problem is unchanged. The affected locations include the groin and abdomen. The rash is characterized by redness, swelling, pain and itchiness. She was exposed to nothing. Pertinent negatives include no anorexia, congestion, cough, diarrhea, facial edema, fatigue, fever, joint pain, rhinorrhea, shortness of breath, sore throat or vomiting. Past treatments include anti-itch cream, moisturizer and cold compress. The treatment provided mild relief. Her past medical history is significant for varicella. There is no history of allergies, asthma or eczema. Hemoglobin A1C (%)   Date Value   12/30/2019 5.9   12/21/2018 5.8   07/13/2017 5.8         ( goal A1C is < 7)   Microalb/Crt.  Ratio (mcg/mg creat)   Date Value   07/13/2017 25 (H)     LDL Cholesterol (mg/dL)   Date Value   03/25/2019 80   12/21/2018 156 (H)   01/15/2014 93     LDL Calculated (mg/dL)   Date Value   06/16/2016 70   12/16/2015 124   02/11/2015 57       (goal LDL is <100)   AST (U/L)   Date Value   09/29/2020 15     ALT (U/L)   Date Value   09/29/2020 18     BUN (mg/dL)   Date Value   09/29/2020 14     BP Readings from Last 3 Encounters:   10/13/20 136/82   09/29/20 118/72 09/08/20 122/82          (goal 120/80)    Past Medical History:   Diagnosis Date    [facility]     Asthma     Bipolar 2 disorder, major depressive episode ([facility]     COPD (chronic obstructive pulmonary disease) ([facility]     Depression     Diabetes mellitus ([facility]     Fibromyalgia     Gastric ulcer     Hyperlipidemia     Hypertension     Hypothyroidism 3/16/2018    Mild intermittent asthma without complication 5/35/3231    NASIR (obstructive sleep apnea)     Osteoporosis     Peripheral neuropathy     Schizophrenia (HCC)     Seizures (HCC)     pseudo seizures    Thyroid disease     hypothyroidism      Past Surgical History:   Procedure Laterality Date    APPENDECTOMY      CHOLECYSTECTOMY      FRACTURE SURGERY      left ankle    HYSTERECTOMY      polyps    HYSTERECTOMY, TOTAL ABDOMINAL      INSERTABLE CARDIAC MONITOR Left 01/10/2018       Family History   Problem Relation Age of Onset    Cancer Mother         skin    Heart Disease Mother     Thyroid Disease Mother     Stroke Mother     Heart Disease Father     High Blood Pressure Father     Stroke Father     Other Father         alzhemier's        Social History     Tobacco Use    Smoking status: Current Every Day Smoker     Packs/day: 0.50     Years: 50.00     Pack years: 25.00     Types: Cigarettes    Smokeless tobacco: Never Used   Substance Use Topics    Alcohol use: No     Alcohol/week: 0.0 standard drinks      Current Outpatient Medications   Medication Sig Dispense Refill    terbinafine (LAMISIL) 250 MG tablet Take 1 tablet by mouth daily for 14 days 14 tablet 0    nystatin (MYCOSTATIN) 628329 UNIT/GM powder Apply 3 times daily. 60 g 5    lisinopril (PRINIVIL;ZESTRIL) 20 MG tablet Take 1 tablet by mouth daily 30 tablet 5    traZODone (DESYREL) 50 MG tablet Take 50 mg by mouth nightly Add to the 150 mg taken at night.       DULoxetine (CYMBALTA) 60 MG extended release capsule TAKE ONE CAPSULE BY MOUTH DAILY 30 capsule 4    lovastatin (MEVACOR) 40 MG tablet TAKE ONE TABLET BY MOUTH ONCE NIGHTLY 30 tablet 10    levothyroxine (SYNTHROID) 150 MCG tablet TAKE ONE TABLET BY MOUTH DAILY 30 tablet 10    ARIPiprazole (ABILIFY) 5 MG tablet Take 5 mg by mouth daily      divalproex (DEPAKOTE ER) 250 MG extended release tablet One in morning, 2 at night      traZODone (DESYREL) 150 MG tablet Take 150 mg by mouth 2 tablets at bedtime       No current facility-administered medications for this visit.       Allergies   Allergen Reactions    Latex Rash    Biaxin [Clarithromycin] Hives    Dicloxacillin Hives    Pcn [Penicillins] Hives    Zithromax [Azithromycin] Hives    Amoxicillin     Exelon [Rivastigmine Tartrate]     Lithium     Lyrica [Pregabalin]     Magnesium Trisilicate     Magnesium-Containing Compounds     Naproxen      rash    Nystatin     Prednisone     Rivastigmine Tartrate     Seroquel [Quetiapine Fumarate] Other (See Comments)     Excess weight gain     Symbicort [Budesonide-Formoterol Fumarate] Other (See Comments)     Chest pain    Aricept [Donepezil Hydrochloride] Nausea And Vomiting    Donepezil Hcl Nausea Only    Meloxicam Rash    Morphine Other (See Comments)     headaches          Health Maintenance   Topic Date Due    Shingles Vaccine (1 of 2) 11/19/2007    Breast cancer screen  08/18/2019    Lipid screen  03/25/2020    Flu vaccine (1) 09/01/2020    Colon cancer screen colonoscopy  09/02/2020    DTaP/Tdap/Td vaccine (1 - Tdap) 11/12/2020 (Originally 11/19/1976)    A1C test (Diabetic or Prediabetic)  12/30/2020    Annual Wellness Visit (AWV)  08/05/2021    TSH testing  09/29/2021    Potassium monitoring  09/29/2021    Creatinine monitoring  09/29/2021    Pneumococcal 0-64 years Vaccine  Completed    Hepatitis C screen  Completed    HIV screen  Completed    Hepatitis A vaccine  Aged Out    Hepatitis B vaccine  Aged Out    Hib vaccine  Aged Out    Meningococcal (ACWY) vaccine Aged Out       Subjective:     Review of Systems   Constitutional: Negative for activity change, appetite change, chills, diaphoresis, fatigue, fever and unexpected weight change. HENT: Negative for congestion, nosebleeds, postnasal drip, rhinorrhea, sinus pressure, sinus pain, sneezing, sore throat, tinnitus, trouble swallowing and voice change. Eyes: Negative for visual disturbance. Respiratory: Negative for cough, choking, chest tightness, shortness of breath, wheezing and stridor. Cardiovascular: Negative for chest pain, palpitations and leg swelling. Gastrointestinal: Negative for abdominal pain, anorexia, constipation, diarrhea and vomiting. Musculoskeletal: Negative for arthralgias and joint pain. Skin: Positive for rash and wound. Negative for color change and pallor. Allergic/Immunologic: Positive for immunocompromised state. Neurological: Negative for dizziness and headaches. Psychiatric/Behavioral: Negative for sleep disturbance. Objective:      Physical Exam  Vitals signs and nursing note reviewed. Constitutional:       General: She is not in acute distress. Appearance: She is obese. She is not ill-appearing or toxic-appearing. HENT:      Head: Normocephalic and atraumatic. Neck:      Musculoskeletal: Normal range of motion and neck supple. Skin:     General: Skin is warm and dry. Findings: Rash present. Rash is macular. Neurological:      Mental Status: She is alert. /82   Pulse 91   Temp 97 °F (36.1 °C) (Temporal)   Resp 18   Ht 5' 7\" (1.702 m)   Wt 244 lb (110.7 kg)   SpO2 98%   BMI 38.22 kg/m²     Assessment:       Diagnosis Orders   1. Fungal infection  Art Curiel MD, Sue Doss   2. Candidiasis  Art Curiel MD, Sue Doss             Plan:       No follow-ups on file.     Orders Placed This Encounter   Procedures   Acacia Banerjee MD, Sue oDss     Referral Priority:   Routine Referral Type:   Eval and Treat     Referral Reason:   Specialty Services Required     Referred to Provider:   Daniele Hoyos MD     Requested Specialty:   Dermatology     Number of Visits Requested:   1     Orders Placed This Encounter   Medications    terbinafine (LAMISIL) 250 MG tablet     Sig: Take 1 tablet by mouth daily for 14 days     Dispense:  14 tablet     Refill:  0    nystatin (MYCOSTATIN) 939925 UNIT/GM powder     Sig: Apply 3 times daily. Dispense:  60 g     Refill:  5    continue nystatin   terbanfine for 2 weeks   Referral to derm   Keep as dry as possible. Call with q/c    Patientgiven educational materials - see patient instructions. Discussed use, benefit,and side effects of prescribed medications. All patient questions answered. Ptvoiced understanding. Reviewed health maintenance. Instructed to continue currentmedications, diet and exercise. Patient agreed with treatment plan. Follow up asdirected.      Electronically signed by Modesta Webster DO on 10/13/2020 at 12:00 PM

## 2020-11-25 ENCOUNTER — VIRTUAL VISIT (OUTPATIENT)
Dept: FAMILY MEDICINE CLINIC | Age: 65
End: 2020-11-25
Payer: COMMERCIAL

## 2020-11-25 PROCEDURE — 99442 PR PHYS/QHP TELEPHONE EVALUATION 11-20 MIN: CPT | Performed by: INTERNAL MEDICINE

## 2020-11-25 RX ORDER — FLUCONAZOLE 150 MG/1
TABLET ORAL
COMMUNITY
Start: 2020-11-20 | End: 2021-03-22 | Stop reason: ALTCHOICE

## 2020-11-25 RX ORDER — DOXYCYCLINE HYCLATE 100 MG/1
CAPSULE ORAL
COMMUNITY
Start: 2020-11-08 | End: 2021-03-22 | Stop reason: ALTCHOICE

## 2020-11-25 RX ORDER — ACETAMINOPHEN AND CODEINE PHOSPHATE 300; 30 MG/1; MG/1
TABLET ORAL
COMMUNITY
Start: 2020-11-20 | End: 2020-12-02 | Stop reason: SDUPTHER

## 2020-11-25 RX ORDER — PREDNISONE 10 MG/1
TABLET ORAL
COMMUNITY
Start: 2020-11-08 | End: 2021-03-22

## 2020-11-25 RX ORDER — LOVASTATIN 40 MG/1
40 TABLET ORAL NIGHTLY
COMMUNITY
End: 2020-11-25

## 2020-11-25 RX ORDER — KETOCONAZOLE 20 MG/G
CREAM TOPICAL
COMMUNITY
Start: 2020-10-20 | End: 2021-04-05

## 2020-11-25 RX ORDER — IPRATROPIUM BROMIDE AND ALBUTEROL SULFATE 2.5; .5 MG/3ML; MG/3ML
SOLUTION RESPIRATORY (INHALATION)
COMMUNITY
Start: 2020-11-09 | End: 2021-04-05

## 2020-11-30 NOTE — TELEPHONE ENCOUNTER
PT states tylenol and another medication to treat COPD was supposed to be called into [facility] but [facility] is saying they do not have any orders for the medications. Please contact PT when medication has been called in or to discuss further.

## 2020-12-02 RX ORDER — ACETAMINOPHEN AND CODEINE PHOSPHATE 300; 30 MG/1; MG/1
1 TABLET ORAL EVERY 8 HOURS PRN
Qty: 21 TABLET | Refills: 0 | Status: SHIPPED | OUTPATIENT
Start: 2020-12-02 | End: 2020-12-09

## 2020-12-02 ASSESSMENT — ENCOUNTER SYMPTOMS
CONSTIPATION: 0
BOWEL INCONTINENCE: 0
SORE THROAT: 1
TROUBLE SWALLOWING: 0
BLOOD IN STOOL: 0
SWOLLEN GLANDS: 1
VOICE CHANGE: 0
ABDOMINAL PAIN: 0
WHEEZING: 1
CHEST TIGHTNESS: 1
SHORTNESS OF BREATH: 1
CHOKING: 0
RHINORRHEA: 0
DIARRHEA: 0
BACK PAIN: 1
STRIDOR: 0
SINUS PAIN: 1
COUGH: 1
SINUS PRESSURE: 0

## 2020-12-02 NOTE — PROGRESS NOTES
[address] [facility]  [address] [place] [address]  Dept: [phone]  Dept Fax: [phone]    Roberto Yates a 63 y.o. female who presents today for her medical conditions/complaints as notedbelow. Roberto Yates is c/o of   Chief Complaint   Patient presents with   [address] [place] [facility]16[place]20    Back Pain     is wondering if you will give her tyn 3         HPI:     Back Pain   This is a recurrent problem. The current episode started more than 1 month ago. The problem occurs constantly. The pain is present in the gluteal, lumbar spine and sacro-iliac. The quality of the pain is described as aching, burning, cramping and shooting. The pain is at a severity of 8/10. The pain is severe. The pain is worse during the day. The symptoms are aggravated by lying down, position, stress, standing and twisting. Stiffness is present in the morning. Associated symptoms include numbness, paresthesias, tingling and weakness. Pertinent negatives include no abdominal pain, bladder incontinence, bowel incontinence, chest pain, dysuria, fever or headaches. Risk factors include history of osteoporosis, menopause, obesity, lack of exercise, poor posture and sedentary lifestyle. She has tried heat, ice, analgesics, bed rest, NSAIDs and walking for the symptoms. The treatment provided mild relief. URI    This is a recurrent problem. The current episode started 1 to 4 weeks ago. The problem has been gradually improving. There has been no fever. Associated symptoms include congestion, coughing, joint pain, neck pain, a plugged ear sensation, sinus pain, sneezing, a sore throat, swollen glands and wheezing. Pertinent negatives include no abdominal pain, chest pain, diarrhea, dysuria, ear pain, headaches, joint swelling, rash or rhinorrhea. She has tried acetaminophen, eating, increased fluids, inhaler use, sleep and NSAIDs for the symptoms.  The Smokeless tobacco: Never Used   Substance Use Topics    Alcohol use: No     Alcohol/week: 0.0 standard drinks      Current Outpatient Medications   Medication Sig Dispense Refill    BREO ELLIPTA 200-25 MCG/INH AEPB inhaler       ipratropium-albuterol (DUONEB) 0.5-2.5 (3) MG/3ML SOLN nebulizer solution U 3 ML VIA NEB QID PRF WHZ      nystatin (MYCOSTATIN) 713921 UNIT/GM powder Apply 3 times daily. 60 g 5    lisinopril (PRINIVIL;ZESTRIL) 20 MG tablet Take 1 tablet by mouth daily 30 tablet 5    traZODone (DESYREL) 50 MG tablet Take 50 mg by mouth nightly Add to the 150 mg taken at night.  DULoxetine (CYMBALTA) 60 MG extended release capsule TAKE ONE CAPSULE BY MOUTH DAILY 30 capsule 4    lovastatin (MEVACOR) 40 MG tablet TAKE ONE TABLET BY MOUTH ONCE NIGHTLY 30 tablet 10    levothyroxine (SYNTHROID) 150 MCG tablet TAKE ONE TABLET BY MOUTH DAILY 30 tablet 10    ARIPiprazole (ABILIFY) 5 MG tablet Take 5 mg by mouth daily      divalproex (DEPAKOTE ER) 250 MG extended release tablet One in morning, 2 at night      traZODone (DESYREL) 150 MG tablet Take 150 mg by mouth 2 tablets at bedtime      acetaminophen-codeine (TYLENOL #3) 300-30 MG per tablet Take 1 tablet by mouth every 8 hours as needed for Pain for up to 7 days. 21 tablet 0    fluconazole (DIFLUCAN) 150 MG tablet TK 1 T PO  ONCE      doxycycline hyclate (VIBRAMYCIN) 100 MG capsule       ketoconazole (NIZORAL) 2 % cream       predniSONE (DELTASONE) 10 MG tablet        No current facility-administered medications for this visit.       Allergies   Allergen Reactions    Latex Rash    Biaxin [Clarithromycin] Hives    Dicloxacillin Hives    Pcn [Penicillins] Hives    Zithromax [Azithromycin] Hives    Amoxicillin     Exelon [Rivastigmine Tartrate]     Lithium     Lyrica [Pregabalin]     Magnesium Trisilicate     Magnesium-Containing Compounds     Naproxen      rash    Nystatin     Prednisone     Rivastigmine Tartrate     Seroquel [Quetiapine Fumarate] Other (See Comments)     Excess weight gain     Symbicort [Budesonide-Formoterol Fumarate] Other (See Comments)     Chest pain    Aricept [Donepezil Hydrochloride] Nausea And Vomiting    Donepezil Hcl Nausea Only    Meloxicam Rash    Morphine Other (See Comments)     headaches          Health Maintenance   Topic Date Due    DTaP/Tdap/Td vaccine (1 - Tdap) 11/19/1976    Shingles Vaccine (1 of 2) 11/19/2007    Breast cancer screen  08/18/2019    Lipid screen  03/25/2020    Flu vaccine (1) 09/01/2020    Colon cancer screen colonoscopy  09/02/2020    A1C test (Diabetic or Prediabetic)  12/30/2020    Annual Wellness Visit (AWV)  08/05/2021    TSH testing  09/29/2021    Potassium monitoring  09/29/2021    Creatinine monitoring  09/29/2021    Pneumococcal 0-64 years Vaccine  Completed    Hepatitis C screen  Completed    HIV screen  Completed    Hepatitis A vaccine  Aged Out    Hepatitis B vaccine  Aged Out    Hib vaccine  Aged Out    Meningococcal (ACWY) vaccine  Aged Out       Subjective:     Review of Systems   Constitutional: Positive for activity change and fatigue. Negative for appetite change, chills, diaphoresis, fever and unexpected weight change. HENT: Positive for congestion, postnasal drip, sinus pain, sneezing and sore throat. Negative for ear discharge, ear pain, rhinorrhea, sinus pressure, tinnitus, trouble swallowing and voice change. Eyes: Negative for visual disturbance. Respiratory: Positive for cough, chest tightness, shortness of breath and wheezing. Negative for choking and stridor. Cardiovascular: Negative for chest pain, palpitations and leg swelling. Gastrointestinal: Negative for abdominal pain, blood in stool, bowel incontinence, constipation and diarrhea. Genitourinary: Negative for bladder incontinence and dysuria. Musculoskeletal: Positive for arthralgias, back pain, gait problem, joint pain, myalgias and neck pain.  Negative for joint office for worsening conditions or problems, and seek emergency medical treatment and/or call 911 if deemed necessary. Patient identification was verified at the start of the visit: Yes    Total time spent for this encounter: 16 minutes    Services were provided through a video synchronous discussion virtually to substitute for in-person clinic visit. Patient and provider were located at their individual homes. --Jennifer Perez DO on 12/2/2020 at 5:20 PM    An electronic signature was used to authenticate this note. Patientgiven educational materials - see patient instructions. Discussed use, benefit,and side effects of prescribed medications. All patient questions answered. Ptvoiced understanding. Reviewed health maintenance. Instructed to continue currentmedications, diet and exercise. Patient agreed with treatment plan. Follow up asdirected.      Electronically signed by Jennifer Perez DO on 12/2/2020 at 5:19 PM

## 2020-12-03 ENCOUNTER — TELEPHONE (OUTPATIENT)
Dept: FAMILY MEDICINE CLINIC | Age: 65
End: 2020-12-03

## 2020-12-03 RX ORDER — ALBUTEROL SULFATE 90 UG/1
2 AEROSOL, METERED RESPIRATORY (INHALATION) EVERY 6 HOURS PRN
Qty: 1 INHALER | Refills: 3 | Status: SHIPPED | OUTPATIENT
Start: 2020-12-03 | End: 2021-05-03 | Stop reason: SDUPTHER

## 2020-12-03 NOTE — TELEPHONE ENCOUNTER
[facility] called, patient called them stating we were supposed to send over an albuterol inhaler for her. I don't see anything in chart on this. Please advise. Thank you.

## 2020-12-07 RX ORDER — DULOXETIN HYDROCHLORIDE 60 MG/1
CAPSULE, DELAYED RELEASE ORAL
Qty: 30 CAPSULE | Refills: 4 | Status: SHIPPED | OUTPATIENT
Start: 2020-12-07 | End: 2021-03-22 | Stop reason: ALTCHOICE

## 2020-12-07 NOTE — TELEPHONE ENCOUNTER
Pharmacy requesting refill of Ibuprofen. Medication active on med list yes      Date of last fill: 7/15/2020  with 3 refills verified on 12/7/2020  verified by VALE KRUEGER      Date of last appointment 7/15/2020    Next Visit Date:  Visit date not found. Patient cancelled her follow up being referred to Neurosurg.  Will refuse and ask to contact office

## 2020-12-21 ENCOUNTER — OFFICE VISIT (OUTPATIENT)
Dept: FAMILY MEDICINE CLINIC | Age: 65
End: 2020-12-21
Payer: COMMERCIAL

## 2020-12-21 ENCOUNTER — TELEPHONE (OUTPATIENT)
Dept: FAMILY MEDICINE CLINIC | Age: 65
End: 2020-12-21

## 2020-12-21 VITALS
SYSTOLIC BLOOD PRESSURE: 126 MMHG | OXYGEN SATURATION: 93 % | TEMPERATURE: 96.8 F | DIASTOLIC BLOOD PRESSURE: 74 MMHG | WEIGHT: 240 LBS | HEIGHT: 67 IN | BODY MASS INDEX: 37.67 KG/M2 | HEART RATE: 97 BPM

## 2020-12-21 PROCEDURE — 99213 OFFICE O/P EST LOW 20 MIN: CPT | Performed by: INTERNAL MEDICINE

## 2020-12-21 RX ORDER — HYDROCODONE BITARTRATE AND ACETAMINOPHEN 5; 300 MG/1; MG/1
1 TABLET ORAL EVERY 6 HOURS PRN
Qty: 28 TABLET | Refills: 0 | Status: SHIPPED | OUTPATIENT
Start: 2020-12-21 | End: 2020-12-22

## 2020-12-21 ASSESSMENT — ENCOUNTER SYMPTOMS
CONSTIPATION: 0
WHEEZING: 0
ABDOMINAL PAIN: 0
STRIDOR: 0
COUGH: 0
SHORTNESS OF BREATH: 0
BACK PAIN: 0
DIARRHEA: 0
CHOKING: 0
CHEST TIGHTNESS: 0

## 2020-12-21 NOTE — PROGRESS NOTES
[address] [facility]  [address] [facility] [address] [address]  Dept: [phone]  Dept Fax: [phone]    Farzad Torres a 63 y.o. female who presents today for her medical conditions/complaints as notedbelow. Farzad Torres is c/o of   Chief Complaint   Patient presents with    Follow-Up from Hospital         HPI:     Had a fall Friday   Went to the ED sudden due to ongoing severe pain to left shoulder   Can barely move arm   They xrayed arm ; normal   Shoulder is what hurts severely   They gave her 4 norco and sling  Has been wearing sling all the time morning and night   Con cern for rotator cuff   No numbness or tingling  Keeping her up at night   norco does not even touch it , has a lot of allergies though     Shoulder Pain   The pain is present in the left shoulder, left arm and left elbow. There has been no history of extremity trauma. The problem occurs constantly. The problem has been unchanged. The quality of the pain is described as aching. The pain is at a severity of 5/10. Pertinent negatives include no fever or numbness. The symptoms are aggravated by cold, contact, lying down and activity. She has tried NSAIDS, oral narcotics, rest, OTC pain meds, heat, cold and movement for the symptoms. The treatment provided no relief. Family history does not include gout. Her past medical history is significant for osteoarthritis. There is no history of diabetes or gout. Hemoglobin A1C (%)   Date Value   12/30/2019 5.9   12/21/2018 5.8   07/13/2017 5.8         ( goal A1C is < 7)   Microalb/Crt.  Ratio (mcg/mg creat)   Date Value   07/13/2017 25 (H)     LDL Cholesterol (mg/dL)   Date Value   03/25/2019 80   12/21/2018 156 (H)   01/15/2014 93     LDL Calculated (mg/dL)   Date Value   06/16/2016 70   12/16/2015 124   02/11/2015 57       (goal LDL is <100)   AST (U/L)   Date Value   09/29/2020 15     ALT (U/L)   Date Value   09/29/2020 18 BUN (mg/dL)   Date Value   09/29/2020 14     BP Readings from Last 3 Encounters:   12/21/20 126/74   10/13/20 136/82   09/29/20 118/72          (goal 120/80)    Past Medical History:   Diagnosis Date    Alzheimer's disease ([facility]     Asthma     Bipolar 2 disorder, major depressive episode ([facility]     COPD (chronic obstructive pulmonary disease) ([facility]     Depression     Diabetes mellitus ([facility]     Fibromyalgia     Gastric ulcer     Hyperlipidemia     Hypertension     Hypothyroidism 3/16/2018    Mild intermittent asthma without complication 7/32/8365    NASIR (obstructive sleep apnea)     Osteoporosis     Peripheral neuropathy     Schizophrenia (HCC)     Seizures (HCC)     pseudo seizures    Thyroid disease     hypothyroidism      Past Surgical History:   Procedure Laterality Date    APPENDECTOMY      CHOLECYSTECTOMY      FRACTURE SURGERY      left ankle    HYSTERECTOMY      polyps    HYSTERECTOMY, TOTAL ABDOMINAL      INSERTABLE CARDIAC MONITOR Left 01/10/2018       Family History   Problem Relation Age of Onset    Cancer Mother         skin    Heart Disease Mother     Thyroid Disease Mother     Stroke Mother     Heart Disease Father     High Blood Pressure Father     Stroke Father     Other Father         alzhemier's        Social History     Tobacco Use    Smoking status: Current Every Day Smoker     Packs/day: 0.50     Years: 50.00     Pack years: 25.00     Types: Cigarettes    Smokeless tobacco: Never Used   Substance Use Topics    Alcohol use: No     Alcohol/week: 0.0 standard drinks      Current Outpatient Medications   Medication Sig Dispense Refill    HYDROcodone-acetaminophen (VICODIN) 5-300 MG TABS Take 1 tablet by mouth every 6 hours as needed for Pain for up to 7 days.  28 tablet 0    DULoxetine (CYMBALTA) 60 MG extended release capsule TAKE ONE CAPSULE BY MOUTH DAILY 30 capsule 4  albuterol sulfate HFA (PROVENTIL HFA) 108 (90 Base) MCG/ACT inhaler Inhale 2 puffs into the lungs every 6 hours as needed for Wheezing 1 Inhaler 3    BREO ELLIPTA 200-25 MCG/INH AEPB inhaler       fluconazole (DIFLUCAN) 150 MG tablet TK 1 T PO  ONCE      doxycycline hyclate (VIBRAMYCIN) 100 MG capsule       ketoconazole (NIZORAL) 2 % cream       predniSONE (DELTASONE) 10 MG tablet       ipratropium-albuterol (DUONEB) 0.5-2.5 (3) MG/3ML SOLN nebulizer solution U 3 ML VIA NEB QID PRF WHZ      nystatin (MYCOSTATIN) 993825 UNIT/GM powder Apply 3 times daily. 60 g 5    lisinopril (PRINIVIL;ZESTRIL) 20 MG tablet Take 1 tablet by mouth daily 30 tablet 5    traZODone (DESYREL) 50 MG tablet Take 50 mg by mouth nightly Add to the 150 mg taken at night.  lovastatin (MEVACOR) 40 MG tablet TAKE ONE TABLET BY MOUTH ONCE NIGHTLY 30 tablet 10    levothyroxine (SYNTHROID) 150 MCG tablet TAKE ONE TABLET BY MOUTH DAILY 30 tablet 10    ARIPiprazole (ABILIFY) 5 MG tablet Take 5 mg by mouth daily      divalproex (DEPAKOTE ER) 250 MG extended release tablet One in morning, 2 at night      traZODone (DESYREL) 150 MG tablet Take 150 mg by mouth 2 tablets at bedtime       No current facility-administered medications for this visit.       Allergies   Allergen Reactions    Latex Rash    Biaxin [Clarithromycin] Hives    Dicloxacillin Hives    Pcn [Penicillins] Hives    Zithromax [Azithromycin] Hives    Amoxicillin     Exelon [Rivastigmine Tartrate]     Lithium     Lyrica [Pregabalin]     Magnesium Trisilicate     Magnesium-Containing Compounds     Naproxen      rash    Nystatin     Prednisone     Rivastigmine Tartrate     Seroquel [Quetiapine Fumarate] Other (See Comments)     Excess weight gain     Symbicort [Budesonide-Formoterol Fumarate] Other (See Comments)     Chest pain    Aricept [Donepezil Hydrochloride] Nausea And Vomiting    Donepezil Hcl Nausea Only    Meloxicam Rash  Morphine Other (See Comments)     headaches          Health Maintenance   Topic Date Due    DTaP/Tdap/Td vaccine (1 - Tdap) 11/19/1976    Shingles Vaccine (1 of 2) 11/19/2007    Breast cancer screen  08/18/2019    Lipid screen  03/25/2020    Colon cancer screen colonoscopy  09/02/2020    A1C test (Diabetic or Prediabetic)  12/30/2020    Flu vaccine (1) 12/21/2021 (Originally 9/1/2020)    Annual Wellness Visit (AWV)  08/05/2021    TSH testing  09/29/2021    Potassium monitoring  09/29/2021    Creatinine monitoring  09/29/2021    Pneumococcal 0-64 years Vaccine  Completed    Hepatitis C screen  Completed    HIV screen  Completed    Hepatitis A vaccine  Aged Out    Hepatitis B vaccine  Aged Out    Hib vaccine  Aged Out    Meningococcal (ACWY) vaccine  Aged Out       Subjective:     Review of Systems   Constitutional: Positive for activity change. Negative for appetite change, chills, diaphoresis, fatigue, fever and unexpected weight change. Eyes: Negative for visual disturbance. Respiratory: Negative for cough, choking, chest tightness, shortness of breath, wheezing and stridor. Cardiovascular: Negative for chest pain, palpitations and leg swelling. Gastrointestinal: Negative for abdominal pain, constipation and diarrhea. Musculoskeletal: Positive for arthralgias. Negative for back pain, gait problem, gout, joint swelling, myalgias, neck pain and neck stiffness. Skin: Negative for rash. Neurological: Positive for weakness. Negative for numbness and headaches. Psychiatric/Behavioral: Negative for sleep disturbance. Objective:      Physical Exam  Vitals signs and nursing note reviewed. Constitutional:       General: She is not in acute distress. Appearance: She is obese. She is not ill-appearing, toxic-appearing or diaphoretic. Comments: Chronically ill    HENT:      Head: Normocephalic and atraumatic.    Neck:  XR SHOULDER LEFT (MIN 2 VIEWS)     Standing Status:   Future     Standing Expiration Date:   12/21/2021     Orders Placed This Encounter   Medications    HYDROcodone-acetaminophen (VICODIN) 5-300 MG TABS     Sig: Take 1 tablet by mouth every 6 hours as needed for Pain for up to 7 days. Dispense:  28 tablet     Refill:  0     Reduce doses taken as pain becomes manageable    Controlled substances monitoring: possible medication side effects, risk of tolerance and/or dependence, and alternative treatments discussed, no signs of potential drug abuse or diversion identified and [place] report reviewed today- activity consistent with treatment plan. One small week of vicodin , has worked the best in the past   Use sparingly  Reviewed ed visit   Will check ct , pt cannot do mri , to r/o rotator cuff . High concern   Info and home exercises given   F/u after testing complete  Call with q/c   Continue sling    Patientgiven educational materials - see patient instructions. Discussed use, benefit,and side effects of prescribed medications. All patient questions answered. Ptvoiced understanding. Reviewed health maintenance. Instructed to continue currentmedications, diet and exercise. Patient agreed with treatment plan. Follow up asdirected.      Electronically signed by Armando Adan DO on 12/21/2020 at 9:53 AM

## 2020-12-21 NOTE — TELEPHONE ENCOUNTER
Patient called she states the pharmacy told her the that they cannot fill the 5-00 they only have 5-325 for her Vicodin

## 2020-12-21 NOTE — PATIENT INSTRUCTIONS
2. Keep your shoulders relaxed and down, not shrugged. 3. Squeeze your shoulder blades together. Hold for 6 seconds, then relax. 4. Repeat 8 to 12 times. Straight-arm shoulder blade squeeze   1. Sit or stand tall. Relax your shoulders. 2. With palms down, hold your elastic tubing or band straight out in front of you. 3. Start with slight tension in the tubing or band, with your hands about shoulder-width apart. 4. Slowly pull straight out to the sides, squeezing your shoulder blades together. Keep your arms straight and at shoulder height. Slowly release. 5. Repeat 8 to 12 times. Rowing   1. [place] your elastic tubing or band at about waist height. Take one end in each hand. 2. Sit or stand with your feet hip-width apart. 3. Hold your arms straight in front of you. Adjust your distance to create slight tension in the tubing or band. 4. Slightly tuck your chin. Relax your shoulders. 5. Without shrugging your shoulders, pull straight back. Your elbows will pass alongside your waist.    Pull-downs   1. [place] your elastic tubing or band in the top of a closed door. Take one end in each hand. 2. Either sit or stand, depending on what is more comfortable. If you feel unsteady, sit on a chair. 3. Start with your arms up and comfortably apart, elbows straight. There should be a slight tension in the tubing or band. 4. Slightly tuck your chin, and look straight ahead. 5. Keeping your back straight, slowly pull down and back, bending your elbows. 6. Stop where your hands are level with your chin, in a \"goalpost\" position. 7. Repeat 8 to 12 times. Chest T stretch   1. Lie on your back. Raise your knees so they are bent. Plant your feet on the floor, hip-width apart. 2. Tuck your chin, and relax your shoulders. 3. Reach your arms straight out to the sides. If you don't feel a mild stretch in your shoulders and across your chest, use a foam roll or a tightly rolled blanket under your spine, from your tailbone to your head. 4. Relax in this position for at least 15 to 30 seconds while you breathe normally. Repeat 2 to 4 times. 5. As you get used to this stretch, keep adding a little more time until you are able relax in this position for 2 or 3 minutes. When you can relax for at least 2 minutes, you only need to do the exercise 1 time per session. Chest goalpost stretch   1. Lie on your back. Raise your knees so they are bent. Plant your feet on the floor, hip-width apart. 2. Tuck your chin, and relax your shoulders. 3. Reach your arms straight out to the sides. 4. Bend your arms at the elbows, with your hands pointed toward the top of your head. Your arms should make an L on either side of your head. Your palms should be facing up. 5. If you don't feel a mild stretch in your shoulders and across your chest, use a foam roll or tightly rolled blanket under your spine, from your tailbone to your head. 6. Relax in this position for at least 15 to 30 seconds while you breathe normally. Repeat 2 to 4 times. 7. Each day you do this exercise, add a little more time until you can relax in this position for 2 or 3 minutes. When you can relax for at least 2 minutes, you only need to do the exercise 1 time per session. Follow-up care is a key part of your treatment and safety. Be sure to make and go to all appointments, and call your doctor if you are having problems. It's also a good idea to know your test results and keep a list of the medicines you take. Where can you learn more? Go to https://Prolifyderrickeb.ThermoCeramix. org and sign in to your [employer] account. Enter [id number] in the Certess box to learn more about \"Shoulder Blade: Exercises. \" If you do not have an account, please click on the \"Sign Up Now\" link. Current as of: March 2, 2020               Content Version: 12.6  © 2006-2020 [employer]. Care instructions adapted under license by [facility] ([facility]). If you have questions about a medical condition or this instruction, always ask your healthcare professional. [address] any warranty or liability for your use of this information. Patient Education        Rotator Cuff: Exercises  Introduction  Here are some examples of exercises for you to try. The exercises may be suggested for a condition or for rehabilitation. Start each exercise slowly. Ease off the exercises if you start to have pain. You will be told when to start these exercises and which ones will work best for you. How to do the exercises  Pendulum swing   If you have pain in your back, do not do this exercise. 1. Hold on to a table or the back of a chair with your good arm. Then bend forward a little and let your sore arm hang straight down. This exercise does not use the arm muscles. Rather, use your legs and your hips to create movement that makes your arm swing freely. 2. Use the movement from your hips and legs to guide the slightly swinging arm back and forth like a pendulum (or elephant trunk). Then guide it in circles that start small (about the size of a dinner plate). Make the circles a bit larger each day, as your pain allows. 3. Do this exercise for 5 minutes, 5 to 7 times each day. 4. As you have less pain, try bending over a little farther to do this exercise. This will increase the amount of movement at your shoulder. Posterior stretching exercise   1. Hold the elbow of your injured arm with your other hand. 2. Use your hand to pull your injured arm gently up and across your body. You will feel a gentle stretch across the back of your injured shoulder. 3. Hold for at least 15 to 30 seconds. Then slowly lower your arm. 4. Repeat 2 to 4 times. Up-the-back stretch   Your doctor or physical therapist may want you to wait to do this stretch until you have regained most of your range of motion and strength. You can do this stretch in different ways. Hold any of these stretches for at least 15 to 30 seconds. Repeat them 2 to 4 times. 1. Light stretch: Put your hand in your back pocket. Let it rest there to stretch your shoulder. 2. Moderate stretch: With your other hand, hold your injured arm (palm outward) behind your back by the wrist. Pull your arm up gently to stretch your shoulder. 3. Advanced stretch: Put a towel over your other shoulder. Put the hand of your injured arm behind your back. Now hold the back end of the towel. With the other hand, hold the front end of the towel in front of your body. Pull gently on the front end of the towel. This will bring your hand farther up your back to stretch your shoulder. Overhead stretch   1. Standing about an arm's length away, grasp onto a solid surface. You could use a countertop, a doorknob, or the back of a sturdy chair. 2. With your knees slightly bent, bend forward with your arms straight. Lower your upper body, and let your shoulders stretch. 3. As your shoulders are able to stretch farther, you may need to take a step or two backward. 4. Hold for at least 15 to 30 seconds. Then stand up and relax. If you had stepped back during your stretch, step forward so you can keep your hands on the solid surface. 5. Repeat 2 to 4 times. Shoulder flexion (lying down)   To make a wand for this exercise, use a piece of PVC pipe or a broom handle with the broom removed. Make the wand about a foot wider than your shoulders. 1. Lie on your back, holding a wand with both hands. Your palms should face down as you hold the wand. 2. Keeping your elbows straight, slowly raise your arms over your head.  Raise them until you feel a stretch in your shoulders, upper back, and chest. 3. Hold for 15 to 30 seconds. 4. Repeat 2 to 4 times. Shoulder rotation (lying down)   To make a wand for this exercise, use a piece of PVC pipe or a broom handle with the broom removed. Make the wand about a foot wider than your shoulders. 1. Lie on your back. Hold a wand with both hands with your elbows bent and palms up. 2. Keep your elbows close to your body, and move the wand across your body toward the sore arm. 3. Hold for 8 to 12 seconds. 4. Repeat 2 to 4 times. Wall climbing (to the side)   Avoid any movement that is straight to your side, and be careful not to arch your back. Your arm should stay about 30 degrees to the front of your side. 1. Stand with your side to a wall so that your fingers can just touch it at an angle about 30 degrees toward the front of your body. 2. Walk the fingers of your injured arm up the wall as high as pain permits. Try not to shrug your shoulder up toward your ear as you move your arm up. 3. Hold that position for a count of at least 15 to 20.  4. Walk your fingers back down to the starting position. 5. Repeat at least 2 to 4 times. Try to reach higher each time. Wall climbing (to the front)   During this stretching exercise, be careful not to arch your back. 1. Face a wall, and stand so your fingers can just touch it. 2. Keeping your shoulder down, walk the fingers of your injured arm up the wall as high as pain permits. (Don't shrug your shoulder up toward your ear.)  3. Hold your arm in that position for at least 15 to 30 seconds. 4. Slowly walk your fingers back down to where you started. 5. Repeat at least 2 to 4 times. Try to reach higher each time. Shoulder blade squeeze   1. Stand with your arms at your sides, and squeeze your shoulder blades together. Do not raise your shoulders up as you squeeze. 2. Hold 6 seconds. 3. Repeat 8 to 12 times.     Scapular exercise: Arm reach 1. Lie flat on your back. This exercise is a very slight motion that starts with your arms raised (elbows straight, arms straight). 2. From this position, reach higher toward the cornelio or ceiling. Keep your elbows straight. All motion should be from your shoulder blade only. 3. Relax your arms back to where you started. 4. Repeat 8 to 12 times. Arm raise to the side   During this strengthening exercise, your arm should stay about 30 degrees to the front of your side. 1. Slowly raise your injured arm to the side, with your thumb facing up. Raise your arm 60 degrees at the most (shoulder level is 90 degrees). 2. Hold the position for 3 to 5 seconds. Then lower your arm back to your side. If you need to, bring your \"good\" arm across your body and place it under the elbow as you lower your injured arm. Use your good arm to keep your injured arm from dropping down too fast.  3. Repeat 8 to 12 times. 4. When you first start out, don't hold any extra weight in your hand. As you get stronger, you may use a 1-pound to 2-pound dumbbell or a small can of food. Shoulder flexor and extensor exercise   These are isometric exercises. That means you contract your muscles without actually moving. 1. Push forward (flex): Stand facing a wall or doorjamb, about 6 inches or less back. Hold your injured arm against your body. Make a closed fist with your thumb on top. Then gently push your hand forward into the wall with about 25% to 50% of your strength. Don't let your body move backward as you push. Hold for about 6 seconds. Relax for a few seconds. Repeat 8 to 12 times. 2. Push backward (extend): Stand with your back flat against a wall. Your upper arm should be against the wall, with your elbow bent 90 degrees (your hand straight ahead). Push your elbow gently back against the wall with about 25% to 50% of your strength. Don't let your body move forward as you push. Hold for about 6 seconds. Relax for a few seconds. Repeat 8 to 12 times. Scapular exercise: Wall push-ups   This exercise is best done with your fingers somewhat turned out, rather than straight up and down. 1. Stand facing a wall, about 12 inches to 18 inches away. 2. Place your hands on the wall at shoulder height. 3. Slowly bend your elbows and bring your face to the wall. Keep your back and hips straight. 4. Push back to where you started. 5. Repeat 8 to 12 times. 6. When you can do this exercise against a wall comfortably, you can try it against a counter. You can then slowly progress to the end of a couch, then to a sturdy chair, and finally to the floor. Scapular exercise: Retraction   For this exercise, you will need elastic exercise material, such as surgical tubing or Thera-Band. 1. Put the band around a solid object at about waist level. (A bedpost will work well.) Each hand should hold an end of the band. 2. With your elbows at your sides and bent to 90 degrees, pull the band back. Your shoulder blades should move toward each other. Then move your arms back where you started. 3. Repeat 8 to 12 times. 4. If you have good range of motion in your shoulders, try this exercise with your arms lifted out to the sides. Keep your elbows at a 90-degree angle. Raise the elastic band up to about shoulder level. Pull the band back to move your shoulder blades toward each other. Then move your arms back where you started. Internal rotator strengthening exercise   1. Start by tying a piece of elastic exercise material to a doorknob. You can use surgical tubing or Thera-Band. 2. Stand or sit with your shoulder relaxed and your elbow bent 90 degrees. Your upper arm should rest comfortably against your side. Squeeze a rolled towel between your elbow and your body for comfort. This will help keep your arm at your side. 3. Hold one end of the elastic band in the hand of the painful arm. 4. Slowly rotate your forearm toward your body until it touches your belly. Slowly move it back to where you started. 5. Keep your elbow and upper arm firmly tucked against the towel roll or at your side. 6. Repeat 8 to 12 times. External rotator strengthening exercise   1. Start by tying a piece of elastic exercise material to a doorknob. You can use surgical tubing or Thera-Band. (You may also hold one end of the band in each hand.)  2. Stand or sit with your shoulder relaxed and your elbow bent 90 degrees. Your upper arm should rest comfortably against your side. Squeeze a rolled towel between your elbow and your body for comfort. This will help keep your arm at your side. 3. Hold one end of the elastic band with the hand of the painful arm. 4. Start with your forearm across your belly. Slowly rotate the forearm out away from your body. Keep your elbow and upper arm tucked against the towel roll or the side of your body until you begin to feel tightness in your shoulder. Slowly move your arm back to where you started. 5. Repeat 8 to 12 times. Follow-up care is a key part of your treatment and safety. Be sure to make and go to all appointments, and call your doctor if you are having problems. It's also a good idea to know your test results and keep a list of the medicines you take. Where can you learn more? Go to https://Bridge Semiconductorderrickeb.Solaborate. org and sign in to your [employer] account. Enter Cesar Carrizales in the [employer] box to learn more about \"Rotator Cuff: Exercises. \"     If you do not have an account, please click on the \"Sign Up Now\" link. Current as of: March 2, 2020               Content Version: 12.6  © 4307-0636 [employer]. Care instructions adapted under license by [facility] ([facility]). If you have questions about a medical condition or this instruction, always ask your healthcare professional. [address] any warranty or liability for your use of this information. Patient Education        Rotator Cuff Injury: Care Instructions  Your Care Instructions     The rotator cuff is a group of tendons and muscles around the shoulder that keeps the upper arm bone in place. It keeps the shoulder joint stable and allows you to raise and rotate your arm. Damage to the rotator cuff can be caused by overuse, a fall, or a direct blow to the shoulder area, which can tear the tendons. Over time, everyday wear can damage the tendons and make injury more likely. Treatment can depend upon the amount of damage to the tendons. In a younger person, surgery may be the first choice. But if you are older than 25, you likely have some wear on your rotator cuff. Surgery may not be the most effective treatment. Your doctor may have you try physical therapy and exercise first.  Follow-up care is a key part of your treatment and safety. Be sure to make and go to all appointments, and call your doctor if you are having problems. It's also a good idea to know your test results and keep a list of the medicines you take. How can you care for yourself at home? · Rest your shoulder as much as you can. If your doctor put your arm in a sling or shoulder immobilizer, wear it as directed. Do not take it off before your doctor tells you to. If it is too tight, loosen it. · Be safe with medicines. Read and follow all instructions on the label. ? If the doctor gave you a prescription medicine for pain, take it as prescribed. ? If you are not taking a prescription pain medicine, ask your doctor if you can take an over-the-counter medicine. · Put ice or a cold pack on your shoulder for 10 to 20 minutes at a time. Try to do this every 1 to 2 hours for the next 3 days (when you are awake). Put a thin cloth between the ice pack and your skin. · After 3 days, put a warm, wet towel on your shoulder. This is to relax the muscles and help blood flow. · While holding a warm, wet towel on your shoulder, lean forward so your arm hangs freely, and gently swing your arm back and forth like a pendulum. You also can do this standing under a warm shower. · Do not do anything that makes your pain worse. · Follow your doctor's advice about whether you need physical therapy. When should you call for help? Call your doctor now or seek immediate medical care if:    · You have severe pain.     · You cannot move your shoulder or arm.     · You have tingling or numbness in your arm or hand.     · Your arm or hand is cool or pale. Watch closely for changes in your health, and be sure to contact your doctor if:    · Your pain gets worse.     · You have new or worse swelling in your arm or hand.     · You do not get better as expected. Where can you learn more? Go to https://GreenPocketpeCro Yachting.United Preference. org and sign in to your [employer] account. Enter [phone] in the Project [employer]K box to learn more about \"Rotator Cuff Injury: Care Instructions. \"     If you do not have an account, please click on the \"Sign Up Now\" link. Current as of: March 2, 2020               Content Version: 12.6  © 4869-4958 [employer]. Care instructions adapted under license by [facility] ([facility]). If you have questions about a medical condition or this instruction, always ask your healthcare professional. [place] [address] any warranty or liability for your use of this information.

## 2020-12-22 RX ORDER — HYDROCODONE BITARTRATE AND ACETAMINOPHEN 5; 325 MG/1; MG/1
1 TABLET ORAL EVERY 6 HOURS PRN
Qty: 28 TABLET | Refills: 0 | Status: SHIPPED | OUTPATIENT
Start: 2020-12-22 | End: 2021-01-11 | Stop reason: SDUPTHER

## 2021-01-05 ENCOUNTER — OFFICE VISIT (OUTPATIENT)
Dept: FAMILY MEDICINE CLINIC | Age: 66
End: 2021-01-05
Payer: COMMERCIAL

## 2021-01-05 VITALS
RESPIRATION RATE: 16 BRPM | HEART RATE: 92 BPM | DIASTOLIC BLOOD PRESSURE: 86 MMHG | BODY MASS INDEX: 37.04 KG/M2 | OXYGEN SATURATION: 94 % | HEIGHT: 67 IN | WEIGHT: 236 LBS | TEMPERATURE: 97.3 F | SYSTOLIC BLOOD PRESSURE: 135 MMHG

## 2021-01-05 DIAGNOSIS — M54.12 CERVICAL RADICULOPATHY: ICD-10-CM

## 2021-01-05 DIAGNOSIS — M25.612 DECREASED RANGE OF MOTION OF LEFT SHOULDER: ICD-10-CM

## 2021-01-05 DIAGNOSIS — R20.0 NUMBNESS AND TINGLING IN LEFT HAND: ICD-10-CM

## 2021-01-05 DIAGNOSIS — S46.912A STRAIN OF LEFT SHOULDER, INITIAL ENCOUNTER: ICD-10-CM

## 2021-01-05 DIAGNOSIS — R20.2 NUMBNESS AND TINGLING IN LEFT HAND: ICD-10-CM
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
## 2021-01-05 PROCEDURE — 99213 OFFICE O/P EST LOW 20 MIN: CPT | Performed by: INTERNAL MEDICINE

## 2021-01-05 RX ORDER — BACLOFEN 10 MG/1
10 TABLET ORAL 3 TIMES DAILY
Qty: 30 TABLET | Refills: 1 | Status: SHIPPED | OUTPATIENT
Start: 2021-01-05 | End: 2021-01-30 | Stop reason: SDUPTHER

## 2021-01-05 NOTE — PROGRESS NOTES
Visit Information    Have you changed or started any medications since your last visit including any over-the-counter medicines, vitamins, or herbal medicines? no   Are you having any side effects from any of your medications? -  no  Have you stopped taking any of your medications? Is so, why? -  no    Have you seen any other physician or provider since your last visit? No  Have you had any other diagnostic tests since your last visit? No  Have you been seen in the emergency room and/or had an admission to a hospital since we last saw you? No  Have you had your routine dental cleaning in the past 6 months? no    Have you activated your [employer] account? If not, what are your barriers?  Yes     Patient Care Team:  Maria Del Rosario Rico DO as PCP - General (Family Medicine)  Maria Del Rosario Rico DO as PCP - [facility] Provider    Medical History Review  Past Medical, Family, and Social History reviewed and does contribute to the patient presenting condition    Health Maintenance   Topic Date Due    DTaP/Tdap/Td vaccine (1 - Tdap) 11/19/1976    Shingles Vaccine (1 of 2) 11/19/2007    Breast cancer screen  08/18/2019    Lipid screen  03/25/2020    Colon cancer screen colonoscopy  09/02/2020    A1C test (Diabetic or Prediabetic)  12/30/2020    Flu vaccine (1) 12/21/2021 (Originally 9/1/2020)    Annual Wellness Visit (AWV)  08/05/2021    TSH testing  09/29/2021    Potassium monitoring  09/29/2021    Creatinine monitoring  09/29/2021    Pneumococcal 0-64 years Vaccine  Completed    Hepatitis C screen  Completed    HIV screen  Completed    Hepatitis A vaccine  Aged Out    Hepatitis B vaccine  Aged Out    Hib vaccine  Aged Out    Meningococcal (ACWY) vaccine  Aged Out

## 2021-01-07 DIAGNOSIS — R20.0 NUMBNESS AND TINGLING IN LEFT HAND: ICD-10-CM
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
## 2021-01-07 DIAGNOSIS — M25.612 DECREASED RANGE OF MOTION OF LEFT SHOULDER: ICD-10-CM

## 2021-01-07 DIAGNOSIS — M54.12 CERVICAL RADICULOPATHY: ICD-10-CM

## 2021-01-07 DIAGNOSIS — R20.2 NUMBNESS AND TINGLING IN LEFT HAND: ICD-10-CM

## 2021-01-07 DIAGNOSIS — S46.912A STRAIN OF LEFT SHOULDER, INITIAL ENCOUNTER: ICD-10-CM

## 2021-01-08 ENCOUNTER — TELEPHONE (OUTPATIENT)
Dept: FAMILY MEDICINE CLINIC | Age: 66
End: 2021-01-08

## 2021-01-08 NOTE — TELEPHONE ENCOUNTER
Patient calling back after xray results were given, She wanted to know what to do for her pain I advised tylenol, motrin, salonpas pataches, and ice heat. She asked if her fingers being numb are normal? And she also has a stiff neck. I discuss with walk in providers and they advised ER.   Explained to patient to go back to ER for evaluation patient agreed

## 2021-01-10 ASSESSMENT — ENCOUNTER SYMPTOMS
CHEST TIGHTNESS: 0
BACK PAIN: 0
WHEEZING: 0
CONSTIPATION: 0
COLOR CHANGE: 0
STRIDOR: 0
COUGH: 0
SHORTNESS OF BREATH: 0
ABDOMINAL PAIN: 0
DIARRHEA: 0
CHOKING: 0

## 2021-01-10 NOTE — PROGRESS NOTES
[facility]  [address] [address]  Dept: [phone]  Dept Fax: [phone]    Levon Patterson a 63 y.o. female who presents today for her medical conditions/complaints as notedbelow. Levon Patterson is c/o of   Chief Complaint   Patient presents with    Shoulder Injury     left shouler pain not any better. HPI:     Here for routine f/u but wants to discuss left arm and shoulder pain   Hurts a lot under the arm pit mainly   Is actually getting worse vs better   intial injury was now about 2.5 to 3 weeks ago   Had gone to the ed   They did humerus and fore arm xrays but not the shoulder   She states yasmine has still not contacted her about ct and xray I had ordered for the shoulder   She is not allowed to have mris due to the shunt in her brain  No cp or sob   Some MSK around clavicle region though , tender to touch   Cannot lift arm much at all without extreme pain   Nothing otc helps   Not sleeping well due to it   vicodin did help a little ; ran out yesterday         Shoulder Injury   The incident occurred at home. The left shoulder is affected. The incident occurred more than 1 week ago. The injury mechanism was a fall. The quality of the pain is described as aching and cramping. The pain radiates to the left arm. The pain is at a severity of 8/10. Associated symptoms include muscle weakness, numbness and tingling. Pertinent negatives include no chest pain. The symptoms are aggravated by movement, overhead lifting and palpation. She has tried elevation, acetaminophen, ice, NSAIDs, immobilization, non-weight bearing and rest for the symptoms. The treatment provided mild relief. Hemoglobin A1C (%)   Date Value   12/30/2019 5.9   12/21/2018 5.8   07/13/2017 5.8         ( goal A1C is < 7)   Microalb/Crt.  Ratio (mcg/mg creat)   Date Value   07/13/2017 25 (H)     LDL Cholesterol (mg/dL)   Date Value 03/25/2019 80   12/21/2018 156 (H)   01/15/2014 93     LDL Calculated (mg/dL)   Date Value   06/16/2016 70   12/16/2015 124   02/11/2015 57       (goal LDL is <100)   AST (U/L)   Date Value   09/29/2020 15     ALT (U/L)   Date Value   09/29/2020 18     BUN (mg/dL)   Date Value   09/29/2020 14     BP Readings from Last 3 Encounters:   01/05/21 135/86   12/21/20 126/74   10/13/20 136/82          (goal 120/80)    Past Medical History:   Diagnosis Date    [facility]     Asthma     Bipolar 2 disorder, major depressive episode ([facility]     COPD (chronic obstructive pulmonary disease) ([facility]     Depression     Diabetes mellitus ([facility]     Fibromyalgia     Gastric ulcer     Hyperlipidemia     Hypertension     Hypothyroidism 3/16/2018    Mild intermittent asthma without complication 8/49/4017    NASIR (obstructive sleep apnea)     Osteoporosis     Peripheral neuropathy     Schizophrenia (HCC)     Seizures (HCC)     pseudo seizures    Thyroid disease     hypothyroidism      Past Surgical History:   Procedure Laterality Date    APPENDECTOMY      CHOLECYSTECTOMY      FRACTURE SURGERY      left ankle    HYSTERECTOMY      polyps    HYSTERECTOMY, TOTAL ABDOMINAL      INSERTABLE CARDIAC MONITOR Left 01/10/2018       Family History   Problem Relation Age of Onset    Cancer Mother         skin    Heart Disease Mother     Thyroid Disease Mother     Stroke Mother     Heart Disease Father     High Blood Pressure Father     Stroke Father     Other Father         alzhemier's        Social History     Tobacco Use    Smoking status: Current Every Day Smoker     Packs/day: 0.50     Years: 50.00     Pack years: 25.00     Types: Cigarettes    Smokeless tobacco: Never Used   Substance Use Topics    Alcohol use: No     Alcohol/week: 0.0 standard drinks      Current Outpatient Medications   Medication Sig Dispense Refill  baclofen (LIORESAL) 10 MG tablet Take 1 tablet by mouth 3 times daily 30 tablet 1    DULoxetine (CYMBALTA) 60 MG extended release capsule TAKE ONE CAPSULE BY MOUTH DAILY 30 capsule 4    albuterol sulfate HFA (PROVENTIL HFA) 108 (90 Base) MCG/ACT inhaler Inhale 2 puffs into the lungs every 6 hours as needed for Wheezing 1 Inhaler 3    BREO ELLIPTA 200-25 MCG/INH AEPB inhaler       fluconazole (DIFLUCAN) 150 MG tablet TK 1 T PO  ONCE      doxycycline hyclate (VIBRAMYCIN) 100 MG capsule       ketoconazole (NIZORAL) 2 % cream       predniSONE (DELTASONE) 10 MG tablet       ipratropium-albuterol (DUONEB) 0.5-2.5 (3) MG/3ML SOLN nebulizer solution U 3 ML VIA NEB QID PRF WHZ      nystatin (MYCOSTATIN) 569541 UNIT/GM powder Apply 3 times daily. 60 g 5    lisinopril (PRINIVIL;ZESTRIL) 20 MG tablet Take 1 tablet by mouth daily 30 tablet 5    traZODone (DESYREL) 50 MG tablet Take 50 mg by mouth nightly Add to the 150 mg taken at night.  lovastatin (MEVACOR) 40 MG tablet TAKE ONE TABLET BY MOUTH ONCE NIGHTLY 30 tablet 10    levothyroxine (SYNTHROID) 150 MCG tablet TAKE ONE TABLET BY MOUTH DAILY 30 tablet 10    ARIPiprazole (ABILIFY) 5 MG tablet Take 5 mg by mouth daily      divalproex (DEPAKOTE ER) 250 MG extended release tablet One in morning, 2 at night      traZODone (DESYREL) 150 MG tablet Take 150 mg by mouth 2 tablets at bedtime       No current facility-administered medications for this visit.       Allergies   Allergen Reactions    Latex Rash    Biaxin [Clarithromycin] Hives    Dicloxacillin Hives    Pcn [Penicillins] Hives    Zithromax [Azithromycin] Hives    Amoxicillin     Exelon [Rivastigmine Tartrate]     Lithium     Lyrica [Pregabalin]     Magnesium Trisilicate     Magnesium-Containing Compounds     Naproxen      rash    Nystatin     Prednisone     Rivastigmine Tartrate     Seroquel [Quetiapine Fumarate] Other (See Comments)     Excess weight gain  Symbicort [Budesonide-Formoterol Fumarate] Other (See Comments)     Chest pain    Aricept [Donepezil Hydrochloride] Nausea And Vomiting    Donepezil Hcl Nausea Only    Meloxicam Rash    Morphine Other (See Comments)     headaches          Health Maintenance   Topic Date Due    DTaP/Tdap/Td vaccine (1 - Tdap) 11/19/1976    Shingles Vaccine (1 of 2) 11/19/2007    Breast cancer screen  08/18/2019    Lipid screen  03/25/2020    Colon cancer screen colonoscopy  09/02/2020    A1C test (Diabetic or Prediabetic)  12/30/2020    Flu vaccine (1) 12/21/2021 (Originally 9/1/2020)    Annual Wellness Visit (AWV)  08/05/2021    TSH testing  09/29/2021    Potassium monitoring  09/29/2021    Creatinine monitoring  09/29/2021    Pneumococcal 0-64 years Vaccine  Completed    Hepatitis C screen  Completed    HIV screen  Completed    Hepatitis A vaccine  Aged Out    Hepatitis B vaccine  Aged Out    Hib vaccine  Aged Out    Meningococcal (ACWY) vaccine  Aged Out       Subjective:     Review of Systems   Constitutional: Positive for activity change. Negative for fatigue, fever and unexpected weight change. Eyes: Negative for visual disturbance. Respiratory: Negative for cough, choking, chest tightness, shortness of breath, wheezing and stridor. Cardiovascular: Negative for chest pain, palpitations and leg swelling. Gastrointestinal: Negative for abdominal pain, constipation and diarrhea. Musculoskeletal: Positive for arthralgias and myalgias. Negative for back pain, gait problem, joint swelling, neck pain and neck stiffness. Skin: Negative for color change, pallor, rash and wound. Neurological: Positive for tingling, weakness and numbness. Negative for dizziness, light-headedness and headaches. Psychiatric/Behavioral: Negative for sleep disturbance. Objective:      Physical Exam  Vitals signs and nursing note reviewed. Constitutional:       General: She is not in acute distress. Appearance: She is obese. She is not ill-appearing, toxic-appearing or diaphoretic. Comments: Chronically ill    HENT:      Head: Normocephalic and atraumatic. Neck:      Musculoskeletal: Normal range of motion. Pain with movement and muscular tenderness present. No injury, torticollis or spinous process tenderness. Cardiovascular:      Rate and Rhythm: Normal rate and regular rhythm. Heart sounds: S1 normal and S2 normal. Heart sounds are distant. No murmur. Pulmonary:      Effort: No bradypnea, accessory muscle usage, prolonged expiration or retractions. Breath sounds: Normal breath sounds and air entry. Chest:      Chest wall: Tenderness present. No mass, lacerations, deformity, swelling, crepitus or edema. There is no dullness to percussion. Musculoskeletal:      Left shoulder: She exhibits decreased range of motion, tenderness, crepitus, pain, spasm and decreased strength. She exhibits no bony tenderness, no swelling, no effusion, no deformity, no laceration and normal pulse. Neurological:      Mental Status: She is alert and oriented to person, place, and time. Cranial Nerves: Cranial nerves are intact. No cranial nerve deficit. Sensory: Sensory deficit present. Motor: Weakness present. No tremor, atrophy or abnormal muscle tone. Gait: Gait is intact. Deep Tendon Reflexes: Reflexes are normal and symmetric. Psychiatric:         Attention and Perception: Attention normal.         Mood and Affect: Mood is anxious. /86 (Site: Right Upper Arm, Position: Sitting, Cuff Size: Large Adult)   Pulse 92   Temp 97.3 °F (36.3 °C) (Temporal)   Resp 16   Ht 5' 7\" (1.702 m)   Wt 236 lb (107 kg)   SpO2 94%   BMI 36.96 kg/m²     Assessment:       Diagnosis Orders   1.  Fall, initial encounter  [facility]    XR SHOULDER LEFT (MIN 2 VIEWS) Patientgiven educational materials - see patient instructions. Discussed use, benefit,and side effects of prescribed medications. All patient questions answered. Ptvoiced understanding. Reviewed health maintenance. Instructed to continue currentmedications, diet and exercise. Patient agreed with treatment plan. Follow up asdirected.      Electronically signed by Leyda Brown DO on 1/10/2021 at 1:22 PM

## 2021-01-11 DIAGNOSIS — S46.912A STRAIN OF LEFT SHOULDER, INITIAL ENCOUNTER: ICD-10-CM
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
## 2021-01-11 RX ORDER — HYDROCODONE BITARTRATE AND ACETAMINOPHEN 5; 325 MG/1; MG/1
1 TABLET ORAL EVERY 6 HOURS PRN
Qty: 28 TABLET | Refills: 0 | Status: SHIPPED | OUTPATIENT
Start: 2021-01-11 | End: 2021-01-18

## 2021-01-22 DIAGNOSIS — S46.912A STRAIN OF LEFT SHOULDER, INITIAL ENCOUNTER: ICD-10-CM

## 2021-01-22 DIAGNOSIS — M25.612 DECREASED RANGE OF MOTION OF LEFT SHOULDER: ICD-10-CM
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
## 2021-01-25 DIAGNOSIS — M25.612 DECREASED RANGE OF MOTION OF LEFT SHOULDER: ICD-10-CM

## 2021-01-25 DIAGNOSIS — R20.2 NUMBNESS AND TINGLING IN LEFT HAND: ICD-10-CM
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
## 2021-01-25 DIAGNOSIS — S46.912A STRAIN OF LEFT SHOULDER, INITIAL ENCOUNTER: ICD-10-CM

## 2021-01-25 DIAGNOSIS — R20.0 NUMBNESS AND TINGLING IN LEFT HAND: ICD-10-CM

## 2021-01-25 DIAGNOSIS — M54.12 CERVICAL RADICULOPATHY: ICD-10-CM

## 2021-01-29 ENCOUNTER — VIRTUAL VISIT (OUTPATIENT)
Dept: FAMILY MEDICINE CLINIC | Age: 66
End: 2021-01-29
Payer: COMMERCIAL

## 2021-01-29 ENCOUNTER — NURSE TRIAGE (OUTPATIENT)
Dept: OTHER | Facility: CLINIC | Age: 66
End: 2021-01-29

## 2021-01-29 DIAGNOSIS — Z12.31 ENCOUNTER FOR SCREENING MAMMOGRAM FOR BREAST CANCER: ICD-10-CM

## 2021-01-29 DIAGNOSIS — S46.912A STRAIN OF LEFT SHOULDER, INITIAL ENCOUNTER: ICD-10-CM

## 2021-01-29 DIAGNOSIS — B96.89 ACUTE BACTERIAL SINUSITIS: Primary | ICD-10-CM

## 2021-01-29 DIAGNOSIS — J01.90 ACUTE BACTERIAL SINUSITIS: Primary | ICD-10-CM

## 2021-01-29 DIAGNOSIS — R91.8 LUNG MASS: ICD-10-CM

## 2021-01-29 DIAGNOSIS — Z13.220 SCREENING FOR HYPERLIPIDEMIA: ICD-10-CM

## 2021-01-29 PROCEDURE — 99442 PR PHYS/QHP TELEPHONE EVALUATION 11-20 MIN: CPT | Performed by: INTERNAL MEDICINE

## 2021-01-29 RX ORDER — DIVALPROEX SODIUM 250 MG/1
TABLET, DELAYED RELEASE ORAL
COMMUNITY
Start: 2021-01-18 | End: 2021-01-29

## 2021-01-29 NOTE — TELEPHONE ENCOUNTER
Reason for Disposition   Continuous (nonstop) coughing interferes with work or school and no improvement using cough treatment per Care Advice    Answer Assessment - Initial Assessment Questions  1. ONSET: \"When did the cough begin? \"       2 weeks    2. SEVERITY: \"How bad is the cough today? \"       Constant    3. RESPIRATORY DISTRESS: \"Describe your breathing. \"      Normal    4. FEVER: \"Do you have a fever? \" If so, ask: \"What is your temperature, how was it measured, and when did it start? \"      No    5. HEMOPTYSIS: \"Are you coughing up any blood? \" If so ask: \"How much? \" (flecks, streaks, tablespoons, etc.)      No    6. TREATMENT: \"What have you done so far to treat the cough? \" (e.g., meds, fluids, humidifier)      Mucinex, Benadryl    7. CARDIAC HISTORY: \"Do you have any history of heart disease? \" (e.g., heart attack, congestive heart failure)      None    8. LUNG HISTORY: \"Do you have any history of lung disease? \"  (e.g., pulmonary embolus, asthma, emphysema)      COPD    9. PE RISK FACTORS: \"Do you have a history of blood clots? \" (or: recent major surgery, recent prolonged travel, bedridden)      No    10. OTHER SYMPTOMS: \"Do you have any other symptoms? (e.g., runny nose, wheezing, chest pain)        Nasal congestion    11. PREGNANCY: \"Is there any chance you are pregnant? \" \"When was your last menstrual period? \"        NA    12. TRAVEL: \"Have you traveled out of the country in the last month? \" (e.g., travel history, exposures)        No    Protocols used: COUGH-ADULT-[place]    Patient called [employer] [facility])  with red flag complaint. Brief description of triage: Continuous cough     Triage indicates for patient to be seen today or tomorrow    Care advice provided, patient verbalizes understanding; denies any other questions or concerns; instructed to call back for any new or worsening symptoms. Writer provided warm transfer to [facility] for appointment scheduling.     Attention Provider: Thank you for allowing me to participate in the care of your patient. The patient was connected to triage in response to information provided to the ECC. Please do not respond through this encounter as the response is not directed to a shared pool.

## 2021-01-30 RX ORDER — SULFAMETHOXAZOLE AND TRIMETHOPRIM 800; 160 MG/1; MG/1
1 TABLET ORAL 2 TIMES DAILY
Qty: 10 TABLET | Refills: 0 | Status: SHIPPED | OUTPATIENT
Start: 2021-01-30 | End: 2021-02-04

## 2021-01-30 RX ORDER — BACLOFEN 10 MG/1
10 TABLET ORAL 3 TIMES DAILY
Qty: 30 TABLET | Refills: 1 | Status: SHIPPED | OUTPATIENT
Start: 2021-01-30 | End: 2021-02-21

## 2021-01-31 NOTE — PROGRESS NOTES
Jesús Alva is a 63 y.o. female evaluated via telephone on 1/29/2021. Consent:  She and/or health care decision maker is aware that that she may receive a bill for this telephone service, depending on her insurance coverage, and has provided verbal consent to proceed: Yes      Documentation:  I communicated with the patient and/or health care decision maker about  Patient has had sinus pressure and cough   Does not feel she has covid   No recent exposure   No chest pressure or sob  Does have inhaler at home   Has tried some otc medications without relief   Has had sxs for about 4-5 days, not any better but not worse either . No f/c   Nasal drainage is yellow/green and when she coughs has yellow sputum as well . Delsa Severance Also had ct chest last Friday   It did not show any fracture of shoulder or rotator cuff damage  Showed some inflammation and her fractured ribs  Also showed a mass near aortic arch   Patient states she has been told a few times before she has multiple pulmonary nodules   Attempting to find previous imaging to compare this to as patient somewhat poor historian   Pt does state baclofen helping greatly with pain , pain overall better and she would liek refill of this as it helped a lot better than the pain medication     Details of this discussion including any medical advice provided: Will check check old records and care everywhere further   If no ct /mri of chest in the last 1-1.5 yrs will need ot up dated based on this ct of the shoulder to re-evaluate nodules . Also will review recent cxr. Will also refill baclofen. Reviewed SE. Will also send in bactrim for one week for likely sinus infection . Pt will have to do this as pt has many/multiple allergies ti abx .  Push fluids and rest. If no improvement or worse in 2-3 days will need ot be seen in walk in or ER to be evaluated for flu/covid, etc.   Call with q/c I affirm this is a Patient Initiated Episode with a Patient who has not had a related appointment within my department in the past 7 days or scheduled within the next 24 hours.     Patient identification was verified at the start of the visit: Yes    Total Time: minutes: 11-20 minutes    Note: not billable if this call serves to triage the patient into an appointment for the relevant concern      Pedro De Leon

## 2021-02-01 DIAGNOSIS — R59.0 HILAR ADENOPATHY: ICD-10-CM

## 2021-02-01 DIAGNOSIS — R91.8 LUNG MASS: Primary | ICD-10-CM

## 2021-02-01 RX ORDER — LISINOPRIL 20 MG/1
20 TABLET ORAL DAILY
Qty: 90 TABLET | Refills: 5 | Status: SHIPPED | OUTPATIENT
Start: 2021-02-01 | End: 2021-04-05

## 2021-02-02 ENCOUNTER — TELEPHONE (OUTPATIENT)
Dept: FAMILY MEDICINE CLINIC | Age: 66
End: 2021-02-02

## 2021-02-21 RX ORDER — BACLOFEN 10 MG/1
TABLET ORAL
Qty: 30 TABLET | Refills: 1 | Status: SHIPPED | OUTPATIENT
Start: 2021-02-21

## 2021-03-07 LAB
AVERAGE GLUCOSE: NORMAL
HBA1C MFR BLD: 6.7 %

## 2021-03-10 ENCOUNTER — TELEPHONE (OUTPATIENT)
Dept: FAMILY MEDICINE CLINIC | Age: 66
End: 2021-03-10

## 2021-03-13 DIAGNOSIS — E78.2 MIXED HYPERLIPIDEMIA: ICD-10-CM

## 2021-03-15 ENCOUNTER — TELEPHONE (OUTPATIENT)
Dept: FAMILY MEDICINE CLINIC | Age: 66
End: 2021-03-15

## 2021-03-15 NOTE — TELEPHONE ENCOUNTER
Yousif Victor Manuel with [facility] home care calling to let us know they started home care she will be getting Nursing, PT and OT she declined an aid     [id number] [place]

## 2021-03-16 RX ORDER — LOVASTATIN 40 MG/1
TABLET ORAL
Qty: 30 TABLET | Refills: 10 | Status: SHIPPED | OUTPATIENT
Start: 2021-03-16

## 2021-03-17 ENCOUNTER — TELEPHONE (OUTPATIENT)
Dept: FAMILY MEDICINE CLINIC | Age: 66
End: 2021-03-17

## 2021-03-17 NOTE — TELEPHONE ENCOUNTER
[facility] home care called stating that they will be seeing patients for another 4 weeks. Christine Larson wanted to let Vicky Sarmiento know that patient has been having L shoulder pain and that patient is rating it 8/10. Statement Selected

## 2021-03-19 ENCOUNTER — TELEPHONE (OUTPATIENT)
Dept: FAMILY MEDICINE CLINIC | Age: 66
End: 2021-03-19

## 2021-03-19 NOTE — TELEPHONE ENCOUNTER
Would do an additional 40 mg of lasix daily so 80 for 7 days and then we can re access where she is it , they can call and up date us

## 2021-03-19 NOTE — TELEPHONE ENCOUNTER
Hardeep Charles from [facility] called to report a significant jump in the patients weight. Hardeep Charles stated that the patient has been under their care for 3 days and her start weight was 233.5lbs and she is now reporting a weight of 239lbs in just the matter of those 3 days. The patient said that her legs were a little swollen so there may be some water retention going on. Her diet has been the same, she is taking Lasix 40mg daily. Hardeep Charles said that patients vitals look good today. /86, HR89-91, pulse ox is at 95%. She still experiences SOB with exertion however she still is a smoker as well. The main concern was the jump in weight.

## 2021-03-22 ENCOUNTER — OFFICE VISIT (OUTPATIENT)
Dept: FAMILY MEDICINE CLINIC | Age: 66
End: 2021-03-22
Payer: COMMERCIAL

## 2021-03-22 ENCOUNTER — TELEPHONE (OUTPATIENT)
Dept: FAMILY MEDICINE CLINIC | Age: 66
End: 2021-03-22

## 2021-03-22 VITALS
WEIGHT: 237 LBS | SYSTOLIC BLOOD PRESSURE: 132 MMHG | HEART RATE: 90 BPM | BODY MASS INDEX: 37.2 KG/M2 | HEIGHT: 67 IN | OXYGEN SATURATION: 95 % | DIASTOLIC BLOOD PRESSURE: 80 MMHG
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
## 2021-03-22 DIAGNOSIS — J44.1 CHRONIC OBSTRUCTIVE PULMONARY DISEASE WITH ACUTE EXACERBATION (HCC): Primary | ICD-10-CM

## 2021-03-22 DIAGNOSIS — Z09 HOSPITAL DISCHARGE FOLLOW-UP: ICD-10-CM

## 2021-03-22 DIAGNOSIS — R73.03 PRE-DIABETES: ICD-10-CM

## 2021-03-22 DIAGNOSIS — G47.33 OSA (OBSTRUCTIVE SLEEP APNEA): ICD-10-CM

## 2021-03-22 DIAGNOSIS — S46.912A STRAIN OF LEFT SHOULDER, INITIAL ENCOUNTER: ICD-10-CM

## 2021-03-22 PROCEDURE — 99214 OFFICE O/P EST MOD 30 MIN: CPT | Performed by: INTERNAL MEDICINE

## 2021-03-22 RX ORDER — DIVALPROEX SODIUM 500 MG/1
TABLET, EXTENDED RELEASE ORAL
COMMUNITY
Start: 2021-03-11

## 2021-03-22 RX ORDER — MONTELUKAST SODIUM 10 MG/1
TABLET ORAL
COMMUNITY
Start: 2021-03-11

## 2021-03-22 RX ORDER — FUROSEMIDE 40 MG/1
80 TABLET ORAL
COMMUNITY
Start: 2021-03-11

## 2021-03-22 RX ORDER — ALBUTEROL SULFATE 90 UG/1
2 AEROSOL, METERED RESPIRATORY (INHALATION) EVERY 6 HOURS PRN
COMMUNITY
Start: 2021-03-10 | End: 2021-04-05

## 2021-03-22 RX ORDER — ARIPIPRAZOLE 20 MG/1
10 TABLET ORAL
COMMUNITY
Start: 2021-02-02

## 2021-03-22 NOTE — TELEPHONE ENCOUNTER
She can take tylenol or motrin as well ( as long as no allergies) scheduled the next day to two to help with the pain ie every 6-8 hours .  I know she already has chronic left shoulder pain which we have imaged before

## 2021-03-22 NOTE — TELEPHONE ENCOUNTER
Jo from home care called stating patient had a fall in her kitchen yesterday. Did not hit her head. Did land on her buttock and used her left arm to help her stand up. She is now having left shoulder pain. Did take a muscle relaxer. Vitals are good. BP is a little low at 97/70. Pain 8 out of 10. Please advise.  Thank you

## 2021-03-23 ENCOUNTER — TELEPHONE (OUTPATIENT)
Dept: PRIMARY CARE CLINIC | Age: 66
End: 2021-03-23

## 2021-03-23 NOTE — TELEPHONE ENCOUNTER
[facility] called and stated that the patient Heart rate was 118 and yesterday was 140 yesterday with increase sob. Pt states this happens with no physical activity. I spoke with Dr. Iesha Joya and she advise that the patient should go to the er. I advised mica and she agreed.

## 2021-03-24 ASSESSMENT — ENCOUNTER SYMPTOMS
STRIDOR: 0
RECTAL PAIN: 0
APNEA: 0
SHORTNESS OF BREATH: 1
CONSTIPATION: 0
NAUSEA: 0
BLOOD IN STOOL: 0
CHEST TIGHTNESS: 0
WHEEZING: 1
ABDOMINAL PAIN: 0
COUGH: 0
ANAL BLEEDING: 0
DIARRHEA: 0
BACK PAIN: 0
CHOKING: 0

## 2021-03-24 NOTE — PROGRESS NOTES
[facility]  [address]  [address] [place] [id number]  Dept: [phone]  Dept Fax: [phone]    Mónica Boswell a 63 y.o. female who presents today for her medical conditions/complaints as notedbelow. Mónica Boswell is c/o of   Chief Complaint   Patient presents with    Follow-Up from Hospital       HPI:     Here for f/u from [facility] hospitalization   She was admitted for chf and copd exacerbation   Was admitted for 4 days   They gave iv abx, iv steroid and iv lasix   She was stablized and sent home   Labs prior to d/c were overall normal     She has been slightly better since d/c  She did not qualify for oxygen but has been using husbands when he does not need as this helps   She also states her cpap is very old and workign with insurnce to get a new one   She feels this might be the cause of her confusion and disoriented carl at night when she gets up and then falls  She fell last night and worsened left shoulder pain   Left shoulder has been an ongoing issues and has had w//u and imaging   Does take baclofen very sparingly when it is very painful   co2 in hospital was high     Had to cancel pulm f/i with dr. Curt Monson due to cost        Hemoglobin A1C (%)   Date Value   03/07/2021 6.7   12/30/2019 5.9   12/21/2018 5.8         ( goal A1C is < 7)   Microalb/Crt.  Ratio (mcg/mg creat)   Date Value   07/13/2017 25 (H)     LDL Cholesterol (mg/dL)   Date Value   03/25/2019 80   12/21/2018 156 (H)   01/15/2014 93     LDL Calculated (mg/dL)   Date Value   06/16/2016 70   12/16/2015 124   02/11/2015 57       (goal LDL is <100)   AST (U/L)   Date Value   09/29/2020 15     ALT (U/L)   Date Value   09/29/2020 18     BUN (mg/dL)   Date Value   09/29/2020 14     BP Readings from Last 3 Encounters:   03/22/21 132/80   01/05/21 135/86   12/21/20 126/74          (goal 120/80)    Past Medical History:   Diagnosis Date    [facility] ([facility]     Asthma     Bipolar 2 disorder, major depressive episode ([facility]     COPD (chronic obstructive pulmonary disease) ([facility]     Depression     Diabetes mellitus ([facility]     Fibromyalgia     Gastric ulcer     Hyperlipidemia     Hypertension     Hypothyroidism 3/16/2018    Mild intermittent asthma without complication 3/12/8210    NASIR (obstructive sleep apnea)     Osteoporosis     Peripheral neuropathy     Schizophrenia ([facility]     Seizures (HCC)     pseudo seizures    Thyroid disease     hypothyroidism      Past Surgical History:   Procedure Laterality Date    APPENDECTOMY      CHOLECYSTECTOMY      FRACTURE SURGERY      left ankle    HYSTERECTOMY      polyps    HYSTERECTOMY, TOTAL ABDOMINAL      INSERTABLE CARDIAC MONITOR Left 01/10/2018       Family History   Problem Relation Age of Onset    Cancer Mother         skin    Heart Disease Mother     Thyroid Disease Mother     Stroke Mother     Heart Disease Father     High Blood Pressure Father     Stroke Father     Other Father         alzhemier's        Social History     Tobacco Use    Smoking status: Current Every Day Smoker     Packs/day: 0.50     Years: 50.00     Pack years: 25.00     Types: Cigarettes    Smokeless tobacco: Never Used   Substance Use Topics    Alcohol use: No     Alcohol/week: 0.0 standard drinks      Current Outpatient Medications   Medication Sig Dispense Refill    metFORMIN (GLUCOPHAGE) 500 MG tablet 500 mg 2 times daily (with meals)       montelukast (SINGULAIR) 10 MG tablet       ARIPiprazole (ABILIFY) 20 MG tablet 10 mg       furosemide (LASIX) 40 MG tablet 80 mg       albuterol sulfate  (90 Base) MCG/ACT inhaler Inhale 2 puffs into the lungs every 6 hours as needed      divalproex (DEPAKOTE ER) 500 MG extended release tablet       lovastatin (MEVACOR) 40 MG tablet TAKE 1 TABLET BY MOUTH NIGHTLY 30 tablet 10    baclofen (LIORESAL) 10 MG tablet TAKE 1 TABLET BY MOUTH THREE TIMES DAILY 30 tablet 1    lisinopril (PRINIVIL;ZESTRIL) 20 MG tablet TAKE 1 TABLET BY MOUTH DAILY 90 tablet 5    albuterol sulfate HFA (PROVENTIL HFA) 108 (90 Base) MCG/ACT inhaler Inhale 2 puffs into the lungs every 6 hours as needed for Wheezing 1 Inhaler 3    ketoconazole (NIZORAL) 2 % cream       nystatin (MYCOSTATIN) 915033 UNIT/GM powder Apply 3 times daily. 60 g 5    traZODone (DESYREL) 50 MG tablet Take 50 mg by mouth nightly Add to the 150 mg taken at night.  levothyroxine (SYNTHROID) 150 MCG tablet TAKE ONE TABLET BY MOUTH DAILY 30 tablet 10    ipratropium-albuterol (DUONEB) 0.5-2.5 (3) MG/3ML SOLN nebulizer solution U 3 ML VIA NEB QID PRF WHZ       No current facility-administered medications for this visit.       Allergies   Allergen Reactions    Latex Rash    Biaxin [Clarithromycin] Hives    Dicloxacillin Hives    Pcn [Penicillins] Hives    Zithromax [Azithromycin] Hives    Amoxicillin     Exelon [Rivastigmine Tartrate]     Lithium     Lyrica [Pregabalin]     Magnesium Trisilicate     Magnesium-Containing Compounds     Naproxen      rash    Nystatin     Prednisone     Rivastigmine Tartrate     Seroquel [Quetiapine Fumarate] Other (See Comments)     Excess weight gain     Symbicort [Budesonide-Formoterol Fumarate] Other (See Comments)     Chest pain    Aricept [Donepezil Hydrochloride] Nausea And Vomiting    Donepezil Hcl Nausea Only    Meloxicam Rash    Morphine Other (See Comments)     headaches          Health Maintenance   Topic Date Due    Diabetic foot exam  Never done    Diabetic retinal exam  Never done    COVID-19 Vaccine (1) Never done    DTaP/Tdap/Td vaccine (1 - Tdap) Never done    Shingles Vaccine (1 of 2) Never done    Diabetic microalbuminuria test  07/13/2018    Breast cancer screen  08/18/2019    Lipid screen  03/25/2020    Colon cancer screen colonoscopy  09/02/2020    Flu vaccine (1) 12/21/2021 (Originally 9/1/2020)    Annual Wellness Visit (AWV)  08/05/2021    TSH testing  09/29/2021    Potassium monitoring  09/29/2021    Creatinine monitoring  09/29/2021    A1C test (Diabetic or Prediabetic)  03/07/2022    Pneumococcal 0-64 years Vaccine  Completed    Hepatitis C screen  Completed    HIV screen  Completed    Hepatitis A vaccine  Aged Out    Hepatitis B vaccine  Aged Out    Hib vaccine  Aged Out    Meningococcal (ACWY) vaccine  Aged Out       Subjective:     Review of Systems   Constitutional: Positive for activity change and fatigue. Negative for appetite change, chills, diaphoresis, fever and unexpected weight change. Eyes: Negative for visual disturbance. Respiratory: Positive for shortness of breath and wheezing. Negative for apnea, cough, choking, chest tightness and stridor. Cardiovascular: Negative for chest pain, palpitations and leg swelling. Gastrointestinal: Negative for abdominal pain, anal bleeding, blood in stool, constipation, diarrhea, nausea and rectal pain. Genitourinary: Negative for difficulty urinating. Musculoskeletal: Positive for arthralgias and joint swelling. Negative for back pain, gait problem, myalgias, neck pain and neck stiffness. Skin: Negative for rash. Allergic/Immunologic: Positive for immunocompromised state. Neurological: Positive for weakness. Negative for dizziness, light-headedness and headaches. Psychiatric/Behavioral: Positive for behavioral problems. Negative for sleep disturbance. The patient is nervous/anxious. Objective:      Physical Exam  Vitals signs and nursing note reviewed. Constitutional:       General: She is not in acute distress. Appearance: Normal appearance. She is well-developed. She is obese. She is not ill-appearing, toxic-appearing or diaphoretic. Comments: Chronically ill     HENT:      Head: Normocephalic and atraumatic.       Right Ear: Tympanic membrane, ear canal and external ear normal.      Left Ear: Tympanic membrane, ear canal and external ear normal.   Neck:

## 2021-03-29 ENCOUNTER — TELEPHONE (OUTPATIENT)
Dept: FAMILY MEDICINE CLINIC | Age: 66
End: 2021-03-29

## 2021-03-29 RX ORDER — CALCIUM CARBONATE/VITAMIN D3 500-10/5ML
1 LIQUID (ML) ORAL 2 TIMES DAILY
Qty: 60 CAPSULE | Refills: 5 | Status: SHIPPED | OUTPATIENT
Start: 2021-03-29 | End: 2021-04-05

## 2021-03-29 NOTE — TELEPHONE ENCOUNTER
Dariela Camarena from [facility] called- they started visitation with patient again. 3 times this week. 2 times next week. 1 time for three weeks. OT and PT withh visit patient. Patient refused home health aide. The hospital wants patient on magnesium 400mg 2 tablets daily for electrolyte replacement- can you send rx to [facility] [place] please.

## 2021-03-29 NOTE — TELEPHONE ENCOUNTER
Dany Palacios I sent to [facility] but pt does have a mag supplement listed under her allergies so I would clarify with patient to make sure it is not an allergic reaction and she is ok to take mag before she starts it

## 2021-04-02 ENCOUNTER — TELEPHONE (OUTPATIENT)
Dept: FAMILY MEDICINE CLINIC | Age: 66
End: 2021-04-02

## 2021-04-05 ENCOUNTER — OFFICE VISIT (OUTPATIENT)
Dept: FAMILY MEDICINE CLINIC | Age: 66
End: 2021-04-05
Payer: COMMERCIAL

## 2021-04-05 VITALS
DIASTOLIC BLOOD PRESSURE: 54 MMHG | BODY MASS INDEX: 38.92 KG/M2 | SYSTOLIC BLOOD PRESSURE: 92 MMHG | WEIGHT: 248 LBS | HEART RATE: 74 BPM | HEIGHT: 67 IN | TEMPERATURE: 97.4 F | OXYGEN SATURATION: 96 %

## 2021-04-05 DIAGNOSIS — J44.1 COPD EXACERBATION (HCC): Primary | ICD-10-CM

## 2021-04-05 DIAGNOSIS — I95.0 IDIOPATHIC HYPOTENSION: ICD-10-CM

## 2021-04-05 DIAGNOSIS — Z09 HOSPITAL DISCHARGE FOLLOW-UP: ICD-10-CM

## 2021-04-05 DIAGNOSIS — I11.0 HYPERTENSIVE HEART DISEASE WITH HEART FAILURE (HCC): ICD-10-CM

## 2021-04-05 DIAGNOSIS — E83.42 HYPOMAGNESEMIA: ICD-10-CM

## 2021-04-05 DIAGNOSIS — R94.6 ABNORMAL RESULTS OF THYROID FUNCTION STUDIES: ICD-10-CM

## 2021-04-05 DIAGNOSIS — E87.5 HYPERKALEMIA: ICD-10-CM

## 2021-04-05 DIAGNOSIS — E03.9 HYPOTHYROIDISM, UNSPECIFIED TYPE: ICD-10-CM

## 2021-04-05 PROBLEM — R06.00 DYSPNEA: Status: ACTIVE | Noted: 2021-03-23

## 2021-04-05 PROBLEM — R09.02 HYPOXIA: Status: ACTIVE | Noted: 2020-11-02

## 2021-04-05 PROCEDURE — 99214 OFFICE O/P EST MOD 30 MIN: CPT | Performed by: INTERNAL MEDICINE

## 2021-04-05 RX ORDER — PREDNISONE 10 MG/1
TABLET ORAL
COMMUNITY
Start: 2021-03-26 | End: 2021-04-05

## 2021-04-05 RX ORDER — DOXYCYCLINE HYCLATE 100 MG
100 TABLET ORAL 2 TIMES DAILY
Qty: 14 TABLET | Refills: 0 | Status: SHIPPED | OUTPATIENT
Start: 2021-04-05 | End: 2021-04-12

## 2021-04-05 RX ORDER — DOXYCYCLINE HYCLATE 100 MG/1
CAPSULE ORAL
COMMUNITY
Start: 2021-03-26 | End: 2021-04-05

## 2021-04-05 RX ORDER — BUPROPION HYDROCHLORIDE 150 MG/1
150 TABLET, EXTENDED RELEASE ORAL 2 TIMES DAILY
Qty: 60 TABLET | Refills: 3 | Status: SHIPPED | OUTPATIENT
Start: 2021-04-05

## 2021-04-05 RX ORDER — MAGNESIUM OXIDE 400 MG/1
800 TABLET ORAL DAILY
COMMUNITY
Start: 2021-03-27 | End: 2021-04-05

## 2021-04-05 ASSESSMENT — ENCOUNTER SYMPTOMS
CHANGE IN BOWEL HABIT: 0
EYE REDNESS: 0
WHEEZING: 1
PHOTOPHOBIA: 0
SWOLLEN GLANDS: 0
COLOR CHANGE: 0
CHEST TIGHTNESS: 0
APNEA: 1
COUGH: 1
SINUS PAIN: 0
SHORTNESS OF BREATH: 1
VOMITING: 0
CHOKING: 0
SINUS PRESSURE: 0
ABDOMINAL PAIN: 0
CONSTIPATION: 0
NAUSEA: 0
VISUAL CHANGE: 0
SORE THROAT: 0
BACK PAIN: 0
RHINORRHEA: 0
DIARRHEA: 0
STRIDOR: 0

## 2021-04-05 NOTE — PROGRESS NOTES
[facility] [facility]  [address] [place] [id number]  Dept: [phone]  Dept Fax: [phone]    Selma Johnson a 63 y.o. female who presents today for her medical conditions/complaints as notedbelow.   Selma Johnson is c/o of   Chief Complaint   Patient presents with   [facility] follow up -[place]         HPI:     Here for hospital follow up   Went to the ED or hospital the day after last appt due to high heart rate, dizziness and worsening shortness of breath   Was then admitted for 4 days   They did cardiac w/u   They found mild diastolic dysfunction even though per charting patient has chf and was treated for that last admission pt clams this is new  Was grade 1   Otherwise echo and stress were normal   Apparently cardio does not want to do anything further or change meds until oral steroids are completed , she has about 5 days left   They had done a taper the last hospital from 3/23 to 3/26 also to treat copd, pt has had quite a few of these in last few months  Is set up with cardio outpatient  Also has pulm appt 4/13   She is using the dulera we started at last appt and feels it is working well   Also is supposed to do nebulizer every 4 hours but has not done one since last night       bp is low today also both checks  She feels dizzy , tired out of it and in a fog just like she did before going to the hospital   They did not change the lasix or the lisinopril she is still taking these both daily  Her mag was 1.6 so they started her on mag 800 mg daily   But her potassium runs borderline high, she does not take potassium supplement, has been having a lot of muscle cramps the last 2 weeks or so   She does not take a MV etc    Last a1c very good   Did bring in low today   No low blood sugars     Also does want to quit smoking a lot does not want to get worse but then she said she is not ready to quit   Has cut back by half  She failed chantix due to se and continued to smoke on patches     Pt has home health still coming out a few times a week as well to check vitals  She also got home 02 qualified last stay so is using with any activity and at night         Heart Problem  This is a recurrent problem. The current episode started more than 1 month ago. Associated symptoms include arthralgias, coughing, fatigue, myalgias and weakness. Pertinent negatives include no abdominal pain, anorexia, change in bowel habit, chest pain, chills, congestion, diaphoresis, fever, headaches, joint swelling, nausea, neck pain, numbness, rash, sore throat, swollen glands, urinary symptoms, vertigo, visual change or vomiting. The symptoms are aggravated by exertion, twisting, walking, stress and standing. She has tried rest, sleep, walking, lying down and relaxation for the symptoms. Hemoglobin A1C (%)   Date Value   03/07/2021 6.7   12/30/2019 5.9   12/21/2018 5.8         ( goal A1C is < 7)   Microalb/Crt.  Ratio (mcg/mg creat)   Date Value   07/13/2017 25 (H)     LDL Cholesterol (mg/dL)   Date Value   03/25/2019 80   12/21/2018 156 (H)   01/15/2014 93     LDL Calculated (mg/dL)   Date Value   06/16/2016 70   12/16/2015 124   02/11/2015 57       (goal LDL is <100)   AST (U/L)   Date Value   09/29/2020 15     ALT (U/L)   Date Value   09/29/2020 18     BUN (mg/dL)   Date Value   09/29/2020 14     BP Readings from Last 3 Encounters:   04/05/21 (!) 92/54   03/22/21 132/80   01/05/21 135/86          (goal 120/80)    Past Medical History:   Diagnosis Date    [facility]     Asthma     Bipolar 2 disorder, major depressive episode ([facility]     COPD (chronic obstructive pulmonary disease) ([facility]     Depression     Diabetes mellitus ([facility]     Fibromyalgia     Gastric ulcer     Hyperlipidemia     Hypertension     Hypothyroidism 3/16/2018    Mild intermittent asthma without complication 1/13/1986    NASIR (obstructive sleep apnea)  Osteoporosis     Peripheral neuropathy     Schizophrenia ([facility]     Seizures ([facility]     pseudo seizures    Thyroid disease     hypothyroidism      Past Surgical History:   Procedure Laterality Date    APPENDECTOMY      CHOLECYSTECTOMY      FRACTURE SURGERY      left ankle    HYSTERECTOMY      polyps    HYSTERECTOMY, TOTAL ABDOMINAL      INSERTABLE CARDIAC MONITOR Left 01/10/2018       Family History   Problem Relation Age of Onset    Cancer Mother         skin    Heart Disease Mother     Thyroid Disease Mother     Stroke Mother     Heart Disease Father     High Blood Pressure Father     Stroke Father     Other Father         alzhemier's        Social History     Tobacco Use    Smoking status: Current Every Day Smoker     Packs/day: 0.50     Years: 50.00     Pack years: 25.00     Types: Cigarettes    Smokeless tobacco: Never Used   Substance Use Topics    Alcohol use: No     Alcohol/week: 0.0 standard drinks      Current Outpatient Medications   Medication Sig Dispense Refill    MAGNESIUM-OXIDE 400 (241.3 Mg) MG TABS tablet TAKE 1 TABLET BY MOUTH TWICE DAILY      doxycycline hyclate (VIBRA-TABS) 100 MG tablet Take 1 tablet by mouth 2 times daily for 7 days 14 tablet 0    buPROPion (WELLBUTRIN SR) 150 MG extended release tablet Take 1 tablet by mouth 2 times daily 60 tablet 3    metFORMIN (GLUCOPHAGE) 500 MG tablet 500 mg 2 times daily (with meals)       montelukast (SINGULAIR) 10 MG tablet       ARIPiprazole (ABILIFY) 20 MG tablet 10 mg       furosemide (LASIX) 40 MG tablet 80 mg       divalproex (DEPAKOTE ER) 500 MG extended release tablet       lovastatin (MEVACOR) 40 MG tablet TAKE 1 TABLET BY MOUTH NIGHTLY 30 tablet 10    baclofen (LIORESAL) 10 MG tablet TAKE 1 TABLET BY MOUTH THREE TIMES DAILY 30 tablet 1    albuterol sulfate HFA (PROVENTIL HFA) 108 (90 Base) MCG/ACT inhaler Inhale 2 puffs into the lungs every 6 hours as needed for Wheezing 1 Inhaler 3    traZODone (DESYREL) 50 HENT: Negative for congestion, postnasal drip, rhinorrhea, sinus pressure, sinus pain, sneezing and sore throat. Eyes: Negative for photophobia, redness and visual disturbance. Respiratory: Positive for apnea, cough, shortness of breath and wheezing. Negative for choking, chest tightness and stridor. Cardiovascular: Positive for leg swelling. Negative for chest pain and palpitations. Gastrointestinal: Negative for abdominal pain, anorexia, change in bowel habit, constipation, diarrhea, nausea and vomiting. Genitourinary: Negative for decreased urine volume, difficulty urinating and urgency. Musculoskeletal: Positive for arthralgias and myalgias. Negative for back pain, gait problem, joint swelling, neck pain and neck stiffness. Skin: Negative for color change, pallor and rash. Allergic/Immunologic: Positive for immunocompromised state. Neurological: Positive for dizziness, weakness and light-headedness. Negative for vertigo, tremors, seizures, syncope, speech difficulty, numbness and headaches. Psychiatric/Behavioral: Positive for sleep disturbance. Negative for confusion and decreased concentration. Objective:      Physical Exam  Vitals signs and nursing note reviewed. Constitutional:       General: She is not in acute distress. Appearance: Normal appearance. She is well-developed. She is obese. She is not ill-appearing, toxic-appearing or diaphoretic. Comments: Chronically ill     HENT:      Head: Normocephalic and atraumatic. Right Ear: Tympanic membrane, ear canal and external ear normal.      Left Ear: Tympanic membrane, ear canal and external ear normal.      Mouth/Throat:      Mouth: Mucous membranes are moist.   Eyes:      Extraocular Movements: Extraocular movements intact. Neck:      Musculoskeletal: Normal range of motion and neck supple. No neck rigidity. Thyroid: No thyroid mass or thyroid tenderness. Vascular: No carotid bruit.    Cardiovascular: Rate and Rhythm: Normal rate and regular rhythm. No extrasystoles are present. Pulses: Normal pulses. Heart sounds: Normal heart sounds, S1 normal and S2 normal. Heart sounds not distant. No murmur. No friction rub. No gallop. Comments: 1 + pitting edema , no rash , no erythema   Pulmonary:      Effort: Pulmonary effort is normal. Prolonged expiration present. No tachypnea, bradypnea, accessory muscle usage, respiratory distress or retractions. Breath sounds: Decreased air movement present. No stridor or transmitted upper airway sounds. Examination of the left-upper field reveals decreased breath sounds. Examination of the right-middle field reveals decreased breath sounds. Examination of the right-lower field reveals decreased breath sounds. Examination of the left-lower field reveals decreased breath sounds. Decreased breath sounds present. No wheezing, rhonchi or rales. Chest:      Chest wall: No lacerations, deformity, swelling, tenderness, crepitus or edema. Abdominal:      General: There is no distension. Palpations: There is no hepatomegaly or splenomegaly. Musculoskeletal:      Right shoulder: She exhibits no tenderness, no bony tenderness, no pain, no spasm, normal pulse and normal strength. Left knee: She exhibits normal range of motion, no swelling, no effusion, no ecchymosis, no deformity, no laceration and no erythema. No tenderness found. Lumbar back: She exhibits spasm. She exhibits normal range of motion, no tenderness, no bony tenderness, no swelling, no edema, no deformity, no pain and normal pulse. Right lower leg: No edema. Left lower leg: No edema. Lymphadenopathy:      Cervical: No cervical adenopathy. Skin:     General: Skin is warm and dry. Capillary Refill: Capillary refill takes less than 2 seconds. Findings: No rash. Neurological:      General: No focal deficit present.       Mental Status: She is alert and oriented to

## 2021-04-09 LAB
ALBUMIN SERPL-MCNC: NORMAL G/DL
ALP BLD-CCNC: NORMAL U/L
ALT SERPL-CCNC: NORMAL U/L
ANION GAP SERPL CALCULATED.3IONS-SCNC: NORMAL MMOL/L
AST SERPL-CCNC: NORMAL U/L
B-TYPE NATRIURETIC PEPTIDE: 20 PG/ML
BILIRUB SERPL-MCNC: NORMAL MG/DL
BUN BLDV-MCNC: NORMAL MG/DL
CALCIUM SERPL-MCNC: NORMAL MG/DL
CHLORIDE BLD-SCNC: NORMAL MMOL/L
CO2: NORMAL
CREAT SERPL-MCNC: NORMAL MG/DL
GFR CALCULATED: NORMAL
GLUCOSE BLD-MCNC: NORMAL MG/DL
MAGNESIUM: 1.7 MG/DL
POTASSIUM SERPL-SCNC: NORMAL MMOL/L
SODIUM BLD-SCNC: NORMAL MMOL/L
TOTAL PROTEIN: NORMAL
TSH SERPL DL<=0.05 MIU/L-ACNC: 1.01 UIU/ML

## 2021-04-13 DIAGNOSIS — E03.9 HYPOTHYROIDISM, UNSPECIFIED TYPE: ICD-10-CM

## 2021-04-13 DIAGNOSIS — R94.6 ABNORMAL RESULTS OF THYROID FUNCTION STUDIES: ICD-10-CM

## 2021-04-13 DIAGNOSIS — I95.0 IDIOPATHIC HYPOTENSION: ICD-10-CM

## 2021-04-13 DIAGNOSIS — I11.0 HYPERTENSIVE HEART DISEASE WITH HEART FAILURE (HCC): ICD-10-CM

## 2021-04-13 RX ORDER — LEVOTHYROXINE SODIUM 0.15 MG/1
TABLET ORAL
Qty: 30 TABLET | Refills: 10 | Status: SHIPPED | OUTPATIENT
Start: 2021-04-13

## 2021-04-26 ENCOUNTER — TELEPHONE (OUTPATIENT)
Dept: FAMILY MEDICINE CLINIC | Age: 66
End: 2021-04-26

## 2021-04-26 NOTE — TELEPHONE ENCOUNTER
PT Gillian Pond for home care called stating that patient has been having severe chest pain for the last 7-8 days. It started last weekend and this week. He states that patients blood pressure is good. He advised the patient that she needs to be seen for chest pain but she declined. She stated that she has many bills to doctors and ER and does not need another one.      Please advise          Gillian Pond Phone: [phone]

## 2021-04-27 ENCOUNTER — TELEPHONE (OUTPATIENT)
Dept: PRIMARY CARE CLINIC | Age: 66
End: 2021-04-27

## 2021-04-29 ENCOUNTER — TELEPHONE (OUTPATIENT)
Dept: FAMILY MEDICINE CLINIC | Age: 66
End: 2021-04-29

## 2021-05-03 ENCOUNTER — OFFICE VISIT (OUTPATIENT)
Dept: FAMILY MEDICINE CLINIC | Age: 66
End: 2021-05-03
Payer: COMMERCIAL

## 2021-05-03 VITALS
WEIGHT: 248 LBS | DIASTOLIC BLOOD PRESSURE: 76 MMHG | SYSTOLIC BLOOD PRESSURE: 128 MMHG | HEIGHT: 67 IN | HEART RATE: 88 BPM | OXYGEN SATURATION: 94 % | BODY MASS INDEX: 38.92 KG/M2

## 2021-05-03 DIAGNOSIS — E03.9 HYPOTHYROIDISM, UNSPECIFIED TYPE: ICD-10-CM

## 2021-05-03 DIAGNOSIS — R73.9 HYPERGLYCEMIA: ICD-10-CM

## 2021-05-03 DIAGNOSIS — E11.9 TYPE 2 DIABETES MELLITUS WITHOUT COMPLICATION, UNSPECIFIED WHETHER LONG TERM INSULIN USE (HCC): ICD-10-CM

## 2021-05-03 DIAGNOSIS — I20.9 ANGINA PECTORIS (HCC): ICD-10-CM

## 2021-05-03 DIAGNOSIS — J44.1 COPD EXACERBATION (HCC): Primary | ICD-10-CM

## 2021-05-03 DIAGNOSIS — R00.0 TACHYCARDIA: ICD-10-CM

## 2021-05-03 PROBLEM — L03.115 CELLULITIS OF RIGHT LEG: Status: ACTIVE | Noted: 2020-11-05

## 2021-05-03 PROCEDURE — 99213 OFFICE O/P EST LOW 20 MIN: CPT | Performed by: INTERNAL MEDICINE

## 2021-05-03 RX ORDER — DILTIAZEM HYDROCHLORIDE 120 MG/1
CAPSULE, COATED, EXTENDED RELEASE ORAL
COMMUNITY
Start: 2021-04-15 | End: 2021-05-03 | Stop reason: SDUPTHER

## 2021-05-03 RX ORDER — ALBUTEROL SULFATE 90 UG/1
2 AEROSOL, METERED RESPIRATORY (INHALATION) EVERY 6 HOURS PRN
Qty: 1 INHALER | Refills: 3 | Status: SHIPPED | OUTPATIENT
Start: 2021-05-03

## 2021-05-03 RX ORDER — LISINOPRIL 20 MG/1
TABLET ORAL
COMMUNITY
Start: 2021-04-30 | End: 2021-05-03

## 2021-05-03 RX ORDER — FLUCONAZOLE 100 MG/1
100 TABLET ORAL DAILY
Qty: 7 TABLET | Refills: 0 | Status: SHIPPED | OUTPATIENT
Start: 2021-05-03 | End: 2021-05-10

## 2021-05-03 RX ORDER — NYSTATIN 100000 [USP'U]/G
POWDER TOPICAL
COMMUNITY
Start: 2021-04-23

## 2021-05-03 RX ORDER — ISOSORBIDE MONONITRATE 30 MG/1
30 TABLET, EXTENDED RELEASE ORAL DAILY
Qty: 30 TABLET | Refills: 3 | Status: SHIPPED | OUTPATIENT
Start: 2021-05-03

## 2021-05-03 ASSESSMENT — ENCOUNTER SYMPTOMS
SHORTNESS OF BREATH: 1
CHOKING: 0
HEMOPTYSIS: 0
RHINORRHEA: 0
FREQUENT THROAT CLEARING: 0
STRIDOR: 0
DIARRHEA: 0
TROUBLE SWALLOWING: 0
COLOR CHANGE: 1
HOARSE VOICE: 0
CONSTIPATION: 0
CHEST TIGHTNESS: 1
DIFFICULTY BREATHING: 1
ABDOMINAL PAIN: 0
BLOOD IN STOOL: 0
COUGH: 0
SORE THROAT: 0
HEARTBURN: 0
WHEEZING: 1

## 2021-05-03 ASSESSMENT — COPD QUESTIONNAIRES: COPD: 1

## 2021-05-03 NOTE — PROGRESS NOTES
[facility]  [address] [address]  Dept: [phone]  Dept Fax: [phone]    Marlee Douglass a 63 y.o. female who presents today for her medical conditions/complaints as notedbelow. Marlee Douglass is c/o of   Chief Complaint   Patient presents with    Shortness of Breath    Medication Reaction     diltiazem         HPI:     Here for f/u   No er or hospitalizations since last visit  Patient though states that she overall feels the same   Has been to a few specialist since last visit ; was refusing before due to [place] and high medical bills  Saw dr. Chris Pinto , switched her from [facility] to [facility] and that is working good/fine as well   Does still have oxygen at home   o2 was lower today than normal she has portable at home o2 but did not bring with her     Also saw cardiology   They started her on cardizem due to her recurrent angina and sob since recent admission   She stopped it though in the last week, after about a week of taking due to rash , and not helping with symptoms at all ie side effects. bp okay today   Home health has finished with her , they completed their course last week     Is not quite due for a1c yet   Not until June 2021   Was mildly elevated last check   Is taking all her DM meds daily, does not see endo   Sugars do fluctuate from 100-180s typically no low or high blood sugar symptoms       Also still has a rash she feels from the Cardizem   Is related/similar to her previous rashes though     COPD  She complains of chest tightness, difficulty breathing, shortness of breath and wheezing. There is no cough, frequent throat clearing, hemoptysis or hoarse voice. This is a recurrent problem. The current episode started more than 1 month ago. The problem occurs constantly. The problem has been gradually worsening. Associated symptoms include dyspnea on exertion and myalgias.  Pertinent negatives 01/15/2014 93     LDL Calculated (mg/dL)   Date Value   06/16/2016 70   12/16/2015 124   02/11/2015 57       (goal LDL is <100)   AST (U/L)   Date Value   09/29/2020 15     ALT (U/L)   Date Value   09/29/2020 18     BUN (mg/dL)   Date Value   09/29/2020 14     BP Readings from Last 3 Encounters:   05/03/21 128/76   04/05/21 (!) 92/54   03/22/21 132/80          (goal 120/80)    Past Medical History:   Diagnosis Date    Alzheimer's disease ([facility]     Asthma     Bipolar 2 disorder, major depressive episode ([facility]     COPD (chronic obstructive pulmonary disease) ([facility]     Depression     Diabetes mellitus ([facility]     Fibromyalgia     Gastric ulcer     Hyperlipidemia     Hypertension     Hypothyroidism 3/16/2018    Mild intermittent asthma without complication 9/33/8366    NASIR (obstructive sleep apnea)     Osteoporosis     Peripheral neuropathy     Schizophrenia (HCC)     Seizures (HCC)     pseudo seizures    Thyroid disease     hypothyroidism      Past Surgical History:   Procedure Laterality Date    APPENDECTOMY      CHOLECYSTECTOMY      FRACTURE SURGERY      left ankle    HYSTERECTOMY      polyps    HYSTERECTOMY, TOTAL ABDOMINAL      INSERTABLE CARDIAC MONITOR Left 01/10/2018       Family History   Problem Relation Age of Onset    Cancer Mother         skin    Heart Disease Mother     Thyroid Disease Mother     Stroke Mother     Heart Disease Father     High Blood Pressure Father     Stroke Father     Other Father         alzhemier's        Social History     Tobacco Use    Smoking status: Current Every Day Smoker     Packs/day: 0.50     Years: 50.00     Pack years: 25.00     Types: Cigarettes    Smokeless tobacco: Never Used   Substance Use Topics    Alcohol use: No     Alcohol/week: 0.0 standard drinks      Current Outpatient Medications   Medication Sig Dispense Refill    albuterol sulfate HFA (PROVENTIL HFA) 108 (90 Base) MCG/ACT inhaler Inhale 2 puffs into the lungs every 6 hours as needed for Wheezing 1 Inhaler 3    NYSTATIN 064667 UNIT/GM powder APPLY THREE TIMES DAILY      isosorbide mononitrate (IMDUR) 30 MG extended release tablet Take 1 tablet by mouth daily 30 tablet 3    fluconazole (DIFLUCAN) 100 MG tablet Take 1 tablet by mouth daily for 7 days 7 tablet 0    levothyroxine (SYNTHROID) 150 MCG tablet TAKE 1 TABLET BY MOUTH DAILY 30 tablet 10    buPROPion (WELLBUTRIN SR) 150 MG extended release tablet Take 1 tablet by mouth 2 times daily 60 tablet 3    metFORMIN (GLUCOPHAGE) 500 MG tablet 500 mg 2 times daily (with meals)       montelukast (SINGULAIR) 10 MG tablet       ARIPiprazole (ABILIFY) 20 MG tablet 10 mg       furosemide (LASIX) 40 MG tablet 80 mg       divalproex (DEPAKOTE ER) 500 MG extended release tablet       lovastatin (MEVACOR) 40 MG tablet TAKE 1 TABLET BY MOUTH NIGHTLY 30 tablet 10    baclofen (LIORESAL) 10 MG tablet TAKE 1 TABLET BY MOUTH THREE TIMES DAILY 30 tablet 1    traZODone (DESYREL) 50 MG tablet Take 50 mg by mouth nightly Add to the 150 mg taken at night. No current facility-administered medications for this visit.       Allergies   Allergen Reactions    Latex Rash    Biaxin [Clarithromycin] Hives    Dicloxacillin Hives    Pcn [Penicillins] Hives    Zithromax [Azithromycin] Hives    Amoxicillin     Exelon [Rivastigmine Tartrate]     Lithium     Lyrica [Pregabalin]     Naproxen      rash    Nystatin     Prednisone     Rivastigmine Tartrate     Seroquel [Quetiapine Fumarate] Other (See Comments)     Excess weight gain     Symbicort [Budesonide-Formoterol Fumarate] Other (See Comments)     Chest pain    Aricept [Donepezil Hydrochloride] Nausea And Vomiting    Donepezil Hcl Nausea Only    Meloxicam Rash    Morphine Other (See Comments)     headaches          Health Maintenance   Topic Date Due    COVID-19 Vaccine (1) Never done    Shingles Vaccine (1 of 2) Never done    Diabetic microalbuminuria test  07/13/2018    Breast disturbance. The patient is nervous/anxious. Objective:      Physical Exam  Vitals signs and nursing note reviewed. Constitutional:       General: She is not in acute distress. Appearance: Normal appearance. She is well-developed. She is obese. She is not ill-appearing, toxic-appearing or diaphoretic. Comments: Chronically ill     HENT:      Head: Normocephalic and atraumatic. Right Ear: Tympanic membrane, ear canal and external ear normal.      Left Ear: Tympanic membrane, ear canal and external ear normal.   Eyes:      Extraocular Movements: Extraocular movements intact. Neck:      Musculoskeletal: Normal range of motion and neck supple. No neck rigidity. Thyroid: No thyroid mass or thyroid tenderness. Vascular: No carotid bruit. Cardiovascular:      Rate and Rhythm: Normal rate and regular rhythm. No extrasystoles are present. Pulses: Normal pulses. Heart sounds: Normal heart sounds, S1 normal and S2 normal. Heart sounds not distant. No murmur. Pulmonary:      Effort: Pulmonary effort is normal. No bradypnea, accessory muscle usage, prolonged expiration, respiratory distress or retractions. Breath sounds: Decreased air movement present. No stridor or transmitted upper airway sounds. Decreased breath sounds present. No wheezing, rhonchi or rales. Abdominal:      Palpations: There is no hepatomegaly or splenomegaly. Musculoskeletal:      Right shoulder: She exhibits no tenderness, no bony tenderness, no pain, no spasm, normal pulse and normal strength. Left knee: She exhibits normal range of motion, no swelling, no effusion, no ecchymosis, no deformity, no laceration and no erythema. No tenderness found. Lumbar back: She exhibits spasm. She exhibits normal range of motion, no tenderness, no bony tenderness, no swelling, no edema, no deformity, no pain and normal pulse. Right lower leg: No edema. Left lower leg: No edema. Lymphadenopathy:      Cervical: No cervical adenopathy. Skin:     General: Skin is warm and dry. Findings: No rash. Neurological:      General: No focal deficit present. Mental Status: She is alert and oriented to person, place, and time. Cranial Nerves: Cranial nerves are intact. No cranial nerve deficit. Sensory: No sensory deficit. Motor: Weakness present. No atrophy or abnormal muscle tone. Coordination: Coordination is intact. Coordination normal.      Gait: Gait abnormal.   Psychiatric:         Attention and Perception: Attention normal.         Mood and Affect: Mood is anxious. Speech: Speech is rapid and pressured. Behavior: Behavior normal.         Thought Content: Thought content normal.         Cognition and Memory: Cognition normal.         Judgment: Judgment normal.       /76   Pulse 88   Ht 5' 7\" (1.702 m)   Wt 248 lb (112.5 kg)   SpO2 94%   BMI 38.84 kg/m²     Assessment:       Diagnosis Orders   1. COPD exacerbation ([facility]     2. Hypothyroidism, unspecified type     3. Tachycardia     4. Hyperglycemia   DIABETES FOOT EXAM   5. Type 2 diabetes mellitus without complication, unspecified whether long term insulin use ([facility]     6. Angina pectoris ([facility]               Plan:       Return in about 2 months (around 7/3/2021), or if symptoms worsen or fail to improve, for with [facility] check .     Orders Placed This Encounter   Procedures     DIABETES FOOT EXAM     Orders Placed This Encounter   Medications    albuterol sulfate HFA (PROVENTIL HFA) 108 (90 Base) MCG/ACT inhaler     Sig: Inhale 2 puffs into the lungs every 6 hours as needed for Wheezing     Dispense:  1 Inhaler     Refill:  3    isosorbide mononitrate (IMDUR) 30 MG extended release tablet     Sig: Take 1 tablet by mouth daily     Dispense:  30 tablet     Refill:  3    fluconazole (DIFLUCAN) 100 MG tablet     Sig: Take 1 tablet by mouth daily for 7 days     Dispense:  7 tablet Refill:  0    discuss cadrizem with cardio , will trial low dose imdur though as has never been on before in the mean time but advised   Reviewed common side effects. Call cardio ASAP  F/u with specialists as scheduled   Refilled rescue inhaler as needed. Use oxygen as much as possible     F/u in 6 weeks for DM /a1c check   Continue to monitor sugars at home  Seek immediate medical attention for any chest pain , severe trouble breathing and/or visual changes. For fungal /candidal rash ; continue nystop at home and also will do 7 days of diflucan . Call with q/c          Patientgiven educational materials - see patient instructions. Discussed use, benefit,and side effects of prescribed medications. All patient questions answered. Ptvoiced understanding. Reviewed health maintenance. Instructed to continue currentmedications, diet and exercise. Patient agreed with treatment plan. Follow up asdirected.      Electronically signed by Elenita Carter DO on 5/3/2021 at 1:10 PM

## 2021-05-03 NOTE — PROGRESS NOTES
Visit Information    Have you changed or started any medications since your last visit including any over-the-counter medicines, vitamins, or herbal medicines? no   Are you having any side effects from any of your medications? -  no  Have you stopped taking any of your medications? Is so, why? -  no    Have you seen any other physician or provider since your last visit? No  Have you had any other diagnostic tests since your last visit? No  Have you been seen in the emergency room and/or had an admission to a hospital since we last saw you? No  Have you had your routine dental cleaning in the past 6 months? no    Have you activated your [employer] account? If not, what are your barriers?  No:      Patient Care Team:  Margaret Dailey DO as PCP - General (Family Medicine)  Margaret Dailey DO as PCP - [facility] Provider    Medical History Review  Past Medical, Family, and Social History reviewed and does contribute to the patient presenting condition    Health Maintenance   Topic Date Due    Diabetic foot exam  Never done    COVID-19 Vaccine (1) Never done    Shingles Vaccine (1 of 2) Never done    Diabetic microalbuminuria test  07/13/2018    Breast cancer screen  08/18/2019    Lipid screen  03/25/2020    Colon cancer screen colonoscopy  09/02/2020    Flu vaccine (Season Ended) 12/21/2021 (Originally 9/1/2021)    DTaP/Tdap/Td vaccine (1 - Tdap) 05/03/2022 (Originally 11/19/1974)    Diabetic retinal exam  05/13/2022 (Originally 11/19/1965)    Annual Wellness Visit (AWV)  08/05/2021    A1C test (Diabetic or Prediabetic)  03/07/2022    TSH testing  04/09/2022    Potassium monitoring  04/09/2022    Creatinine monitoring  04/09/2022    Pneumococcal 0-64 years Vaccine  Completed    Hepatitis C screen  Completed    HIV screen  Completed    Hepatitis A vaccine  Aged Out    Hepatitis B vaccine  Aged Out    Hib vaccine  Aged Out    Meningococcal (ACWY) vaccine  Aged Out

## 2021-05-07 ENCOUNTER — TELEPHONE (OUTPATIENT)
Dept: FAMILY MEDICINE CLINIC | Age: 66
End: 2021-05-07

## 2021-05-07 NOTE — TELEPHONE ENCOUNTER
Patient called stating that Cullen Pacheco was going to send patient to Pulmonary rehab. Harmony Zhang can you place the order and I will help patient get set up.      Thank you

## 2021-05-10 ENCOUNTER — TELEPHONE (OUTPATIENT)
Dept: FAMILY MEDICINE CLINIC | Age: 66
End: 2021-05-10

## 2021-05-10 DIAGNOSIS — J43.9 PULMONARY EMPHYSEMA, UNSPECIFIED EMPHYSEMA TYPE (HCC): ICD-10-CM

## 2021-05-10 DIAGNOSIS — J44.1 COPD EXACERBATION (HCC): Primary | ICD-10-CM

## 2021-05-10 DIAGNOSIS — I11.0 HYPERTENSIVE HEART DISEASE WITH HEART FAILURE (HCC): ICD-10-CM

## 2021-05-10 NOTE — TELEPHONE ENCOUNTER
Patient called in stating when she saw Dr. Renato Light on 05/03/21 she told her she wanted her to start pulmonary rehabilitation. Patient contacted [facility] to schedule this and was told they had not received an order for this. Elissa Chambers was told she needed Dr. Renato Light to send them ICD 10 codes and documentation stating why rehab was needed. Patient understands Dr. Renato Light is no longer at the office and is asking if we can please fax this information to [phone]. Thank you.

## 2021-05-12 ENCOUNTER — TELEPHONE (OUTPATIENT)
Dept: FAMILY MEDICINE CLINIC | Age: 66
End: 2021-05-12

## 2021-05-12 NOTE — TELEPHONE ENCOUNTER
Patient is requesting her diabetes medication be refilled at [facility] [phone]   FX# [phone]. The name that she mentioned is not list ed so I could not enter it. Last OV 05/03/2021  Patient can be reached at [phone]. Okay to leave a message.

## 2021-08-02 ENCOUNTER — TELEPHONE (OUTPATIENT)
Dept: FAMILY MEDICINE CLINIC | Age: 66
End: 2021-08-02

## 2021-08-02 NOTE — TELEPHONE ENCOUNTER
Ravinder Arango was contacted as part of mammography outreach. Message left on machine for patient to return call to schedule appointment.

## 2022-10-08 ENCOUNTER — HOSPITAL ENCOUNTER (EMERGENCY)
Age: 67
Discharge: HOME | End: 2022-10-08
Attending: EMERGENCY MEDICINE
Payer: COMMERCIAL

## 2022-10-08 ENCOUNTER — APPOINTMENT (OUTPATIENT)
Dept: CT IMAGING | Age: 67
End: 2022-10-08
Payer: COMMERCIAL

## 2022-10-08 VITALS
RESPIRATION RATE: 20 BRPM | SYSTOLIC BLOOD PRESSURE: 124 MMHG | BODY MASS INDEX: 31.39 KG/M2 | HEART RATE: 94 BPM | DIASTOLIC BLOOD PRESSURE: 88 MMHG | WEIGHT: 200 LBS | TEMPERATURE: 98 F | HEIGHT: 67 IN | OXYGEN SATURATION: 95 %

## 2022-10-08 DIAGNOSIS — R19.7 DIARRHEA, UNSPECIFIED TYPE: ICD-10-CM

## 2022-10-08 DIAGNOSIS — R10.9 ABDOMINAL PAIN, UNSPECIFIED ABDOMINAL LOCATION: Primary | ICD-10-CM

## 2022-10-08 DIAGNOSIS — R11.0 NAUSEA: ICD-10-CM

## 2022-10-08 LAB
ABSOLUTE EOS #: 0.22 K/UL (ref 0–0.44)
ABSOLUTE IMMATURE GRANULOCYTE: 0.11 K/UL (ref 0–0.3)
ABSOLUTE LYMPH #: 3.29 K/UL (ref 1.1–3.7)
ABSOLUTE MONO #: 0.69 K/UL (ref 0.1–1.2)
ALBUMIN SERPL-MCNC: 4 G/DL (ref 3.5–5.2)
ALP BLD-CCNC: 48 U/L (ref 35–104)
ALT SERPL-CCNC: 10 U/L (ref 5–33)
ANION GAP SERPL CALCULATED.3IONS-SCNC: 13 MMOL/L (ref 9–17)
AST SERPL-CCNC: 10 U/L
BASOPHILS # BLD: 1 % (ref 0–2)
BASOPHILS ABSOLUTE: 0.06 K/UL (ref 0–0.2)
BILIRUB SERPL-MCNC: 0.2 MG/DL (ref 0.3–1.2)
BILIRUBIN DIRECT: 0.1 MG/DL
BILIRUBIN URINE: NEGATIVE
BILIRUBIN, INDIRECT: 0.1 MG/DL (ref 0–1)
BUN BLDV-MCNC: 19 MG/DL (ref 8–23)
BUN/CREAT BLD: 18 (ref 9–20)
CALCIUM SERPL-MCNC: 9.1 MG/DL (ref 8.6–10.4)
CHLORIDE BLD-SCNC: 93 MMOL/L (ref 98–107)
CO2: 30 MMOL/L (ref 20–31)
COLOR: YELLOW
CREAT SERPL-MCNC: 1.05 MG/DL (ref 0.5–0.9)
EOSINOPHILS RELATIVE PERCENT: 2 % (ref 1–4)
EPITHELIAL CELLS UA: NORMAL /HPF (ref 0–5)
GFR SERPL CREATININE-BSD FRML MDRD: 59 ML/MIN/1.73M2
GLUCOSE BLD-MCNC: 91 MG/DL (ref 70–99)
GLUCOSE URINE: NEGATIVE
HCT VFR BLD CALC: 40.4 % (ref 36.3–47.1)
HEMOGLOBIN: 13.7 G/DL (ref 11.9–15.1)
IMMATURE GRANULOCYTES: 1 %
KETONES, URINE: NEGATIVE
LACTIC ACID: 1.7 MMOL/L (ref 0.5–2.2)
LEUKOCYTE ESTERASE, URINE: NEGATIVE
LIPASE: 31 U/L (ref 13–60)
LYMPHOCYTES # BLD: 32 % (ref 24–43)
MCH RBC QN AUTO: 30.6 PG (ref 25.2–33.5)
MCHC RBC AUTO-ENTMCNC: 33.9 G/DL (ref 28.4–34.8)
MCV RBC AUTO: 90.2 FL (ref 82.6–102.9)
MONOCYTES # BLD: 7 % (ref 3–12)
NITRITE, URINE: NEGATIVE
NRBC AUTOMATED: 0 PER 100 WBC
PDW BLD-RTO: 13.2 % (ref 11.8–14.4)
PH UA: 7 (ref 5–8)
PLATELET # BLD: 158 K/UL (ref 138–453)
PMV BLD AUTO: 10.1 FL (ref 8.1–13.5)
POTASSIUM SERPL-SCNC: 3.8 MMOL/L (ref 3.7–5.3)
PROTEIN UA: NEGATIVE
RBC # BLD: 4.48 M/UL (ref 3.95–5.11)
RBC UA: NORMAL /HPF (ref 0–2)
SEG NEUTROPHILS: 57 % (ref 36–65)
SEGMENTED NEUTROPHILS ABSOLUTE COUNT: 5.92 K/UL (ref 1.5–8.1)
SODIUM BLD-SCNC: 136 MMOL/L (ref 135–144)
SPECIFIC GRAVITY UA: 1.01 (ref 1–1.03)
TOTAL PROTEIN: 6.4 G/DL (ref 6.4–8.3)
TURBIDITY: CLEAR
URINE HGB: NEGATIVE
UROBILINOGEN, URINE: NORMAL
WBC # BLD: 10.3 K/UL (ref 3.5–11.3)
WBC UA: NORMAL /HPF (ref 0–5)

## 2022-10-08 PROCEDURE — 99285 EMERGENCY DEPT VISIT HI MDM: CPT

## 2022-10-08 PROCEDURE — 85025 COMPLETE CBC W/AUTO DIFF WBC: CPT

## 2022-10-08 PROCEDURE — 74177 CT ABD & PELVIS W/CONTRAST: CPT

## 2022-10-08 PROCEDURE — 83690 ASSAY OF LIPASE: CPT

## 2022-10-08 PROCEDURE — 80048 BASIC METABOLIC PNL TOTAL CA: CPT

## 2022-10-08 PROCEDURE — 96375 TX/PRO/DX INJ NEW DRUG ADDON: CPT

## 2022-10-08 PROCEDURE — 96374 THER/PROPH/DIAG INJ IV PUSH: CPT

## 2022-10-08 PROCEDURE — 81001 URINALYSIS AUTO W/SCOPE: CPT

## 2022-10-08 PROCEDURE — 96372 THER/PROPH/DIAG INJ SC/IM: CPT

## 2022-10-08 PROCEDURE — 6360000004 HC RX CONTRAST MEDICATION: Performed by: NURSE PRACTITIONER

## 2022-10-08 PROCEDURE — C9113 INJ PANTOPRAZOLE SODIUM, VIA: HCPCS | Performed by: NURSE PRACTITIONER

## 2022-10-08 PROCEDURE — 2580000003 HC RX 258: Performed by: NURSE PRACTITIONER

## 2022-10-08 PROCEDURE — 6360000002 HC RX W HCPCS: Performed by: NURSE PRACTITIONER

## 2022-10-08 PROCEDURE — 83605 ASSAY OF LACTIC ACID: CPT

## 2022-10-08 PROCEDURE — 80076 HEPATIC FUNCTION PANEL: CPT

## 2022-10-08 RX ORDER — DICYCLOMINE HYDROCHLORIDE 10 MG/ML
20 INJECTION INTRAMUSCULAR ONCE
Status: COMPLETED | OUTPATIENT
Start: 2022-10-08 | End: 2022-10-08

## 2022-10-08 RX ORDER — 0.9 % SODIUM CHLORIDE 0.9 %
80 INTRAVENOUS SOLUTION INTRAVENOUS ONCE
Status: COMPLETED | OUTPATIENT
Start: 2022-10-08 | End: 2022-10-08

## 2022-10-08 RX ORDER — SODIUM CHLORIDE 9 MG/ML
INJECTION, SOLUTION INTRAVENOUS CONTINUOUS
Status: DISCONTINUED | OUTPATIENT
Start: 2022-10-08 | End: 2022-10-08 | Stop reason: HOSPADM

## 2022-10-08 RX ORDER — ONDANSETRON 2 MG/ML
4 INJECTION INTRAMUSCULAR; INTRAVENOUS ONCE
Status: COMPLETED | OUTPATIENT
Start: 2022-10-08 | End: 2022-10-08

## 2022-10-08 RX ORDER — ONDANSETRON 4 MG/1
4 TABLET, ORALLY DISINTEGRATING ORAL EVERY 8 HOURS PRN
Qty: 10 TABLET | Refills: 0 | Status: SHIPPED | OUTPATIENT
Start: 2022-10-08

## 2022-10-08 RX ORDER — SODIUM CHLORIDE 0.9 % (FLUSH) 0.9 %
10 SYRINGE (ML) INJECTION PRN
Status: DISCONTINUED | OUTPATIENT
Start: 2022-10-08 | End: 2022-10-08 | Stop reason: HOSPADM

## 2022-10-08 RX ADMIN — IOPAMIDOL 75 ML: 755 INJECTION, SOLUTION INTRAVENOUS at 19:03

## 2022-10-08 RX ADMIN — SODIUM CHLORIDE, PRESERVATIVE FREE 40 MG: 5 INJECTION INTRAVENOUS at 17:30

## 2022-10-08 RX ADMIN — ONDANSETRON 4 MG: 2 INJECTION INTRAMUSCULAR; INTRAVENOUS at 17:30

## 2022-10-08 RX ADMIN — DICYCLOMINE HYDROCHLORIDE 20 MG: 10 INJECTION, SOLUTION INTRAMUSCULAR at 20:32

## 2022-10-08 RX ADMIN — SODIUM CHLORIDE, PRESERVATIVE FREE 10 ML: 5 INJECTION INTRAVENOUS at 19:03

## 2022-10-08 RX ADMIN — SODIUM CHLORIDE: 9 INJECTION, SOLUTION INTRAVENOUS at 17:33

## 2022-10-08 RX ADMIN — SODIUM CHLORIDE 80 ML: 9 INJECTION, SOLUTION INTRAVENOUS at 19:03

## 2022-10-08 ASSESSMENT — ENCOUNTER SYMPTOMS
BACK PAIN: 0
SHORTNESS OF BREATH: 0
COUGH: 0
ABDOMINAL PAIN: 1
COLOR CHANGE: 0
DIARRHEA: 1
VOMITING: 0
NAUSEA: 1

## 2022-10-08 ASSESSMENT — PAIN - FUNCTIONAL ASSESSMENT: PAIN_FUNCTIONAL_ASSESSMENT: 0-10

## 2022-10-08 ASSESSMENT — PAIN SCALES - GENERAL: PAINLEVEL_OUTOF10: 8

## 2022-10-08 NOTE — ED PROVIDER NOTES
[facility] ED  eMERGENCY dEPARTMENT eNCOUnter      Pt Name: Honey Vides  MRN: [id number]  [place] 1955  Date of evaluation: 10/8/2022  Provider: RASHI Braden CNP    CHIEF COMPLAINT       Chief Complaint   Patient presents with    Abdominal Pain    Nausea         HISTORY OF PRESENT ILLNESS  (Location/Symptom, Timing/Onset, Context/Setting, Quality, Duration, Modifying Factors, Severity.)   Honey Vides is a 61 y.o. female who presents to the emergency department. C/o upper abd cramping and nausea. Onset was yesterday. Reports loose stools yesterday. Denies fever, chills, emesis, urinary sx. She believes her sx are related to something she ate. Her  has similar sx. She had hernia repair surgery 3 weeks ago. Rates her pain 8/10 at this time. Nursing Notes were reviewed.     ALLERGIES     Latex, Biaxin [clarithromycin], Dicloxacillin, Pcn [penicillins], Zithromax [azithromycin], Amoxicillin, Exelon [rivastigmine tartrate], Lithium, Lyrica [pregabalin], Naproxen, Nystatin, Prednisone, Rivastigmine tartrate, Seroquel [quetiapine fumarate], Symbicort [budesonide-formoterol fumarate], Aricept [donepezil hydrochloride], Donepezil hcl, Meloxicam, and Morphine    CURRENT MEDICATIONS       Discharge Medication List as of 10/8/2022  8:19 PM        CONTINUE these medications which have NOT CHANGED    Details   metFORMIN (GLUCOPHAGE) 500 MG tablet Take 1 tablet by mouth 2 times daily (with meals), Disp-60 tablet, R-5Normal      albuterol sulfate HFA (PROVENTIL HFA) 108 (90 Base) MCG/ACT inhaler Inhale 2 puffs into the lungs every 6 hours as needed for Wheezing, Disp-1 Inhaler, R-3Normal      NYSTATIN 399640 UNIT/GM powder APPLY THREE TIMES DAILY, DAWHistorical Med      isosorbide mononitrate (IMDUR) 30 MG extended release tablet Take 1 tablet by mouth daily, Disp-30 tablet, R-3Normal      levothyroxine (SYNTHROID) 150 MCG tablet TAKE 1 TABLET BY MOUTH DAILY, Disp-30 tablet, R-10Normal      buPROPion (WELLBUTRIN SR) 150 MG extended release tablet Take 1 tablet by mouth 2 times daily, Disp-60 tablet, R-3Normal      montelukast (SINGULAIR) 10 MG tablet Historical Med      ARIPiprazole (ABILIFY) 20 MG tablet 10 mg Historical Med      furosemide (LASIX) 40 MG tablet 80 mg Historical Med      divalproex (DEPAKOTE ER) 500 MG extended release tablet Historical Med      lovastatin (MEVACOR) 40 MG tablet TAKE 1 TABLET BY MOUTH NIGHTLY, Disp-30 tablet, R-10Normal      baclofen (LIORESAL) 10 MG tablet TAKE 1 TABLET BY MOUTH THREE TIMES DAILY, Disp-30 tablet, R-1Normal      traZODone (DESYREL) 50 MG tablet Take 50 mg by mouth nightly Add to the 150 mg taken at night. Historical Med             PAST MEDICAL HISTORY         Diagnosis Date    Alzheimer's disease ([facility]     Asthma     Bipolar 2 disorder, major depressive episode ([facility]     COPD (chronic obstructive pulmonary disease) ([facility]     Depression     Diabetes mellitus ([facility]     Fibromyalgia     Gastric ulcer     Hyperlipidemia     Hypertension     Hypothyroidism 3/16/2018    Mild intermittent asthma without complication 2834    NASIR (obstructive sleep apnea)     Osteoporosis     Peripheral neuropathy     Schizophrenia ([facility]     Seizures (HCC)     pseudo seizures    Thyroid disease     hypothyroidism       SURGICAL HISTORY           Procedure Laterality Date    APPENDECTOMY      CHOLECYSTECTOMY      FRACTURE SURGERY      left ankle    HERNIA REPAIR      HYSTERECTOMY (CERVIX STATUS UNKNOWN)      polyps    HYSTERECTOMY, TOTAL ABDOMINAL (CERVIX REMOVED)      INSERTABLE CARDIAC MONITOR Left 01/10/2018         FAMILY HISTORY           Problem Relation Age of Onset    Cancer Mother         skin    Heart Disease Mother     Thyroid Disease Mother     Stroke Mother     Heart Disease Father     High Blood Pressure Father     Stroke Father     Other Father         alzhemier's      Family Status   Relation Name Status    Mother      Father         SOCIAL HISTORY      reports that she has been smoking cigarettes. She has a 25.00 pack-year smoking history. She has never used smokeless tobacco. She reports that she does not drink alcohol and does not use drugs. REVIEW OF SYSTEMS    (2-9 systems for level 4, 10 or more for level 5)     Review of Systems   Constitutional:  Negative for chills, diaphoresis, fatigue and fever. Respiratory:  Negative for cough and shortness of breath. Cardiovascular:  Negative for chest pain. Gastrointestinal:  Positive for abdominal pain, diarrhea and nausea. Negative for vomiting. Genitourinary:  Negative for dysuria, flank pain, frequency, hematuria and urgency. Musculoskeletal:  Negative for arthralgias, back pain, myalgias and neck pain. Skin:  Negative for color change and rash. Neurological:  Negative for dizziness, weakness, light-headedness and headaches. Except as noted above the remainder of the review of systems was reviewed and negative. PHYSICAL EXAM    (up to 7 for level 4, 8 or more for level 5)     ED Triage Vitals [10/08/22 1659]   BP Temp Temp Source Heart Rate Resp SpO2 Height Weight   124/88 98 °F (36.7 °C) Oral 94 20 95 % 5' 7\" (1.702 m) 200 lb (90.7 kg)     Physical Exam  Vitals reviewed. Constitutional:       General: She is not in acute distress. Appearance: She is well-developed. She is not diaphoretic. Eyes:      General: No scleral icterus. Conjunctiva/sclera: Conjunctivae normal.   Cardiovascular:      Rate and Rhythm: Normal rate. Pulmonary:      Effort: Pulmonary effort is normal. No respiratory distress. Breath sounds: No stridor. Abdominal:      General: There is no distension. Palpations: Abdomen is soft. Tenderness: There is generalized abdominal tenderness. There is no guarding. Musculoskeletal:      Cervical back: Neck supple. Comments: Moves extremities. Skin:     General: Skin is warm and dry.       Findings: No rash.   Neurological:      Mental Status: She is alert and oriented to person, place, and time. Psychiatric:         Behavior: Behavior normal.      DIAGNOSTIC RESULTS     RADIOLOGY:   Non-plain film images such as CT, Ultrasound and MRI are read by the radiologist. Plain radiographic images are visualized and preliminarily interpreted by the emergency physician with the below findings:    Interpretation per the Radiologist below, if available at the time of this note:    CT ABDOMEN PELVIS W IV CONTRAST Additional Contrast? None    Result Date: 10/8/2022  EXAMINATION: CT OF THE ABDOMEN AND PELVIS WITH CONTRAST 10/8/2022 6:57 pm TECHNIQUE: CT of the abdomen and pelvis was performed with the administration of intravenous contrast. Multiplanar reformatted images are provided for review. Automated exposure control, iterative reconstruction, and/or weight based adjustment of the mA/kV was utilized to reduce the radiation dose to as low as reasonably achievable. COMPARISON: 08/05/2010 HISTORY: ORDERING SYSTEM PROVIDED HISTORY: pain TECHNOLOGIST PROVIDED HISTORY: pain Decision Support Exception - unselect if not a suspected or confirmed emergency medical condition->Emergency Medical Condition (MA) Reason for Exam: PT c/o generalized abdominal pain with nausea. FINDINGS: Lower Chest: No acute abnormality in the visualized lower thorax. Small hiatal hernia. Organs: Prior cholecystectomy. No biliary ductal dilatation. Liver, pancreas, spleen, adrenal glands, and kidneys are unremarkable. No hydronephrosis or urinary tract calculus. GI/Bowel: No acute bowel abnormality. Nonvisualized appendix. No pericecal inflammatory changes. Pelvis: Urinary bladder is unremarkable. Prior hysterectomy. No pelvic mass. Peritoneum/Retroperitoneum: No free air or free fluid. Normal abdominal aortic caliber. Mild calcific atherosclerosis. No abnormal lymph node. Bones/Soft Tissues: No acute osseous abnormality.   Small inflamed fluid and fat containing supraumbilical midline ventral abdominal wall hernia with the neck measuring less than 1 cm in diameter. Punctate metallic foreign body in the superficial left anterior abdominal wall is unchanged. 1. Small inflamed fat and fluid containing supraumbilical midline ventral abdominal wall hernia. No bowel involvement. 2. Small hiatal hernia. LABS:  Labs Reviewed   CBC WITH AUTO DIFFERENTIAL - Abnormal; Notable for the following components:       Result Value    Immature Granulocytes 1 (*)     All other components within normal limits   BASIC METABOLIC PANEL - Abnormal; Notable for the following components:    Creatinine 1.05 (*)     Est, Glom Filt Rate 59 (*)     Chloride 93 (*)     All other components within normal limits   HEPATIC FUNCTION PANEL - Abnormal; Notable for the following components: Total Bilirubin 0.2 (*)     All other components within normal limits   LACTIC ACID   LIPASE   URINALYSIS WITH MICROSCOPIC       All other labs were within normal range or not returned as of this dictation.     EMERGENCY DEPARTMENT COURSE and DIFFERENTIAL DIAGNOSIS/MDM:   Vitals:    Vitals:    10/08/22 1659   BP: 124/88   Pulse: 94   Resp: 20   Temp: 98 °F (36.7 °C)   TempSrc: Oral   SpO2: 95%   Weight: 200 lb (90.7 kg)   Height: 5' 7\" (1.702 m)         MEDICATIONS GIVEN IN THE ED:  Medications   0.9 % sodium chloride infusion ( IntraVENous Stopped 10/8/22 1848)   sodium chloride flush 0.9 % injection 10 mL (10 mLs IntraVENous Given 10/8/22 1903)   ondansetron (ZOFRAN) injection 4 mg (4 mg IntraVENous Given 10/8/22 1730)   pantoprazole (PROTONIX) 40 mg in sodium chloride (PF) 10 mL injection (40 mg IntraVENous Given 10/8/22 1730)   0.9 % sodium chloride bolus (0 mLs IntraVENous Stopped 10/8/22 1904)   iopamidol (ISOVUE-370) 76 % injection 75 mL (75 mLs IntraVENous Given 10/8/22 1903)   dicyclomine (BENTYL) injection 20 mg (20 mg IntraMUSCular Given 10/8/22 2032)       CLINICAL DECISION MAKING:  The patient presented alert with a nontoxic appearance and was seen in conjunction with Dr. Lyndon Hoffman. Laboratory studies were unremarkable. Imaging was negative for acute findings. Findings were discussed with the patient. A prescription was written for Zofran. She reported improvement here. Follow up with pcp for a recheck. Evaluation and treatment course in the ED, and plan of care upon discharge was discussed in length with the patient. Patient had no further questions prior to being discharged and was instructed to return to the ED for new or worsening symptoms. Care was provided during an unprecedented national emergency due to the novel coronavirus, Covid-19. FINAL IMPRESSION      1. Abdominal pain, unspecified abdominal location    2. Nausea    3.  Diarrhea, unspecified type            Problem List  Patient Active Problem List   Diagnosis Code    Vitamin D deficiency E55.9    COPD (chronic obstructive pulmonary disease) ([facility] J44.9    Edema R60.9    Hypotension I95.9    SOB (shortness of breath) R06.02    Tremor R25.1    Sleep apnea G47.30    Knee pain, bilateral M25.561, M25.562    Chronic pain of left ankle M25.572, G89.29    Unstable angina (HCC) I20.0    Hypothyroidism E03.9    Mild intermittent asthma without complication W41.81    Closed left ankle fracture S82.892A    Schizoaffective disorder ([facility]) F25.9    Alzheimer's disease ([facility] [facility] G30.9, F02.80    Anxiety F41.9    Bipolar 1 disorder ([facility]) F31.9    Cardiac device in situ Z95.9    Chronic constipation K59.09    Gastroesophageal reflux disease K21.9    History of colonic polyps Z86.010    Hypertensive heart disease without congestive heart failure I11.9    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    Fibromyalgia M79.7    COPD exacerbation ([facility] [facility] J44.1    Hypoxia R09.02    Dyspnea R06.00    Cellulitis of right leg L03.115         DISPOSITION/PLAN   DISPOSITION Decision To Discharge 10/08/2022 08:19:19 PM      PATIENT REFERRED TO:   Amalia Chin DO  [address] [facility] [id number] [phone]    Schedule an appointment as soon as possible for a visit       [facility] ED  [address]  [phone]    If symptoms worsen, As needed    DISCHARGE MEDICATIONS:     Discharge Medication List as of 10/8/2022  8:19 PM        START taking these medications    Details   ondansetron (ZOFRAN ODT) 4 MG disintegrating tablet Take 1 tablet by mouth every 8 hours as needed for Nausea or Vomiting, Disp-10 tablet, R-0Print                 (Please note that portions of this note were completed with a voice recognition program.  Efforts were made to edit the dictations but occasionally words are mis-transcribed.)    RASHI Little CNP, APRN - CNP  10/08/22 2922

## 2022-10-09 NOTE — ED PROVIDER NOTES
eMERGENCY dEPARTMENT eNCOUnter   [address] [place] [facility] Name: Bridgette Price  MRN: [id number]  [place] 1955  Date of evaluation: 10/8/22     Bridgette Price is a 61 y.o. female with CC: Abdominal Pain and Nausea      This visit was performed by both a physician and an APC. I performed all aspects of the MDM as documented.     Denton Woodall DO  Attending Emergency Physician                  Theresa Prado DO  10/08/22 2006

## 2023-06-05 ENCOUNTER — HOSPITAL ENCOUNTER (EMERGENCY)
Age: 68
Discharge: HOME | End: 2023-06-05
Attending: EMERGENCY MEDICINE
Payer: COMMERCIAL

## 2023-06-05 VITALS
RESPIRATION RATE: 18 BRPM | HEIGHT: 67 IN | WEIGHT: 222 LBS | BODY MASS INDEX: 34.84 KG/M2 | SYSTOLIC BLOOD PRESSURE: 140 MMHG | OXYGEN SATURATION: 98 % | DIASTOLIC BLOOD PRESSURE: 85 MMHG | TEMPERATURE: 97.5 F | HEART RATE: 77 BPM

## 2023-06-05 DIAGNOSIS — M79.604 BILATERAL LEG PAIN: Primary | ICD-10-CM

## 2023-06-05 DIAGNOSIS — M79.605 BILATERAL LEG PAIN: Primary | ICD-10-CM

## 2023-06-05 LAB
ANION GAP SERPL CALCULATED.3IONS-SCNC: 11 MMOL/L (ref 9–17)
BASOPHILS # BLD: 0.05 K/UL (ref 0–0.2)
BASOPHILS NFR BLD: 1 % (ref 0–2)
BNP SERPL-MCNC: 50 PG/ML
BUN SERPL-MCNC: 18 MG/DL (ref 8–23)
BUN/CREAT SERPL: 18 (ref 9–20)
CALCIUM SERPL-MCNC: 9.2 MG/DL (ref 8.6–10.4)
CHLORIDE SERPL-SCNC: 97 MMOL/L (ref 98–107)
CO2 SERPL-SCNC: 29 MMOL/L (ref 20–31)
CREAT SERPL-MCNC: 0.99 MG/DL (ref 0.5–0.9)
D DIMER PPP FEU-MCNC: <0.27 UG/ML FEU (ref 0–0.59)
EOSINOPHIL # BLD: 0.09 K/UL (ref 0–0.44)
EOSINOPHILS RELATIVE PERCENT: 1 % (ref 1–4)
ERYTHROCYTE [DISTWIDTH] IN BLOOD BY AUTOMATED COUNT: 13.5 % (ref 11.8–14.4)
GFR SERPL CREATININE-BSD FRML MDRD: >60 ML/MIN/1.73M2
GLUCOSE SERPL-MCNC: 98 MG/DL (ref 70–99)
HCT VFR BLD AUTO: 41.6 % (ref 36.3–47.1)
HGB BLD-MCNC: 13.5 G/DL (ref 11.9–15.1)
IMM GRANULOCYTES # BLD AUTO: 0.08 K/UL (ref 0–0.3)
IMM GRANULOCYTES NFR BLD: 1 %
LYMPHOCYTES # BLD: 25 % (ref 24–43)
LYMPHOCYTES NFR BLD: 2.25 K/UL (ref 1.1–3.7)
MAGNESIUM SERPL-MCNC: 1.9 MG/DL (ref 1.6–2.6)
MCH RBC QN AUTO: 30.5 PG (ref 25.2–33.5)
MCHC RBC AUTO-ENTMCNC: 32.5 G/DL (ref 28.4–34.8)
MCV RBC AUTO: 93.9 FL (ref 82.6–102.9)
MONOCYTES NFR BLD: 0.57 K/UL (ref 0.1–1.2)
MONOCYTES NFR BLD: 6 % (ref 3–12)
NEUTROPHILS NFR BLD: 66 % (ref 36–65)
NEUTS SEG NFR BLD: 5.86 K/UL (ref 1.5–8.1)
NRBC AUTOMATED: 0 PER 100 WBC
PLATELET # BLD AUTO: 276 K/UL (ref 138–453)
PMV BLD AUTO: 9.2 FL (ref 8.1–13.5)
POTASSIUM SERPL-SCNC: 4.4 MMOL/L (ref 3.7–5.3)
RBC # BLD AUTO: 4.43 M/UL (ref 3.95–5.11)
SODIUM SERPL-SCNC: 137 MMOL/L (ref 135–144)
WBC OTHER # BLD: 8.9 K/UL (ref 3.5–11.3)

## 2023-06-05 PROCEDURE — 83735 ASSAY OF MAGNESIUM: CPT

## 2023-06-05 PROCEDURE — 99283 EMERGENCY DEPT VISIT LOW MDM: CPT | Performed by: EMERGENCY MEDICINE

## 2023-06-05 PROCEDURE — 6370000000 HC RX 637 (ALT 250 FOR IP): Performed by: EMERGENCY MEDICINE

## 2023-06-05 PROCEDURE — 85379 FIBRIN DEGRADATION QUANT: CPT

## 2023-06-05 PROCEDURE — 85027 COMPLETE CBC AUTOMATED: CPT

## 2023-06-05 PROCEDURE — 80048 BASIC METABOLIC PNL TOTAL CA: CPT

## 2023-06-05 PROCEDURE — 83880 ASSAY OF NATRIURETIC PEPTIDE: CPT

## 2023-06-05 RX ORDER — ONDANSETRON 4 MG/1
4 TABLET, ORALLY DISINTEGRATING ORAL 3 TIMES DAILY PRN
Qty: 21 TABLET | Refills: 0 | Status: SHIPPED | OUTPATIENT
Start: 2023-06-05

## 2023-06-05 RX ORDER — ONDANSETRON 4 MG/1
4 TABLET, ORALLY DISINTEGRATING ORAL 3 TIMES DAILY PRN
Qty: 21 TABLET | Refills: 0 | Status: SHIPPED | OUTPATIENT
Start: 2023-06-05 | End: 2023-06-05 | Stop reason: SDUPTHER

## 2023-06-05 RX ORDER — HYDROCODONE BITARTRATE AND ACETAMINOPHEN 5; 325 MG/1; MG/1
1 TABLET ORAL EVERY 6 HOURS PRN
Qty: 12 TABLET | Refills: 0 | Status: SHIPPED | OUTPATIENT
Start: 2023-06-05 | End: 2023-06-05 | Stop reason: SDUPTHER

## 2023-06-05 RX ORDER — OXYCODONE HYDROCHLORIDE AND ACETAMINOPHEN 5; 325 MG/1; MG/1
1 TABLET ORAL ONCE
Status: COMPLETED | OUTPATIENT
Start: 2023-06-05 | End: 2023-06-05

## 2023-06-05 RX ORDER — HYDROCODONE BITARTRATE AND ACETAMINOPHEN 5; 325 MG/1; MG/1
1 TABLET ORAL EVERY 6 HOURS PRN
Qty: 12 TABLET | Refills: 0 | Status: SHIPPED | OUTPATIENT
Start: 2023-06-05 | End: 2023-06-08

## 2023-06-05 RX ADMIN — OXYCODONE HYDROCHLORIDE AND ACETAMINOPHEN 1 TABLET: 5; 325 TABLET ORAL at 08:38

## 2023-06-05 ASSESSMENT — ENCOUNTER SYMPTOMS
CHEST TIGHTNESS: 0
FACIAL SWELLING: 0
ABDOMINAL DISTENTION: 0
EYE PAIN: 0
BACK PAIN: 0
SHORTNESS OF BREATH: 0
EYE DISCHARGE: 0
ABDOMINAL PAIN: 0

## 2023-06-05 ASSESSMENT — PAIN - FUNCTIONAL ASSESSMENT: PAIN_FUNCTIONAL_ASSESSMENT: 0-10

## 2023-06-05 ASSESSMENT — PAIN DESCRIPTION - DESCRIPTORS: DESCRIPTORS: THROBBING

## 2023-06-05 ASSESSMENT — PAIN SCALES - GENERAL: PAINLEVEL_OUTOF10: 9

## 2023-06-05 ASSESSMENT — PAIN DESCRIPTION - ORIENTATION: ORIENTATION: RIGHT;LEFT

## 2023-06-05 ASSESSMENT — PAIN DESCRIPTION - LOCATION: LOCATION: LEG

## 2023-06-05 NOTE — ED PROVIDER NOTES
Disposition    Patient repeat assessment: Orders for D-dimer and troponin were within normal limits. No evidence of cellulitis or infection. Patient received Norco.  Patient is discharged home with prescription for Nancylee Marking are given continue outpatient follow-up and parameters to return to the emergency department. Disposition discussion with patient/family:  yes    Case discussed with consulting clinician:  MONET    MIPS:  NA    Social determinants of health impacting treatment or disposition:  none    Shared Decision Making: The patient was involved in his/her plan of care through shared decision making. The testing that was ordered was discussed with the patient. Any medications that may have been ordered were discussed with the patient    Code Status Discussion:  full code    \"ED Course\" Notes From Epic Narrator:         CRITICAL CARE:       PROCEDURES:    Procedures      DATA FOR LAB AND RADIOLOGY TESTS ORDERED BELOW ARE REVIEWED BY THE ED CLINICIAN:    RADIOLOGY: All x-rays, CT, MRI, and formal ultrasound images (except ED bedside ultrasound) are read by the radiologist, see reports below, unless otherwise noted in MDM or here. Reports below are reviewed by myself. No orders to display       LABS: Lab orders shown below, the results are reviewed by myself, and all abnormals are listed below.   Labs Reviewed   BASIC METABOLIC PANEL - Abnormal; Notable for the following components:       Result Value    Creatinine 0.99 (*)     Chloride 97 (*)     All other components within normal limits   CBC WITH AUTO DIFFERENTIAL - Abnormal; Notable for the following components:    Seg Neutrophils 66 (*)     Immature Granulocytes 1 (*)     All other components within normal limits   D-DIMER, QUANTITATIVE   MAGNESIUM   BRAIN NATRIURETIC PEPTIDE       Vitals Reviewed:    Vitals:    06/05/23 0719   BP: (!) 140/85   Pulse: 77   Resp: 18   Temp: 97.5 °F (36.4 °C)   TempSrc: Oral   SpO2: 98%   Weight: 222 lb 0.1 oz (100.7 kg)

## 2024-10-02 ENCOUNTER — APPOINTMENT (OUTPATIENT)
Dept: GENERAL RADIOLOGY | Age: 69
End: 2024-10-02
Payer: COMMERCIAL

## 2024-10-02 ENCOUNTER — HOSPITAL ENCOUNTER (EMERGENCY)
Age: 69
Discharge: HOME | End: 2024-10-02
Attending: EMERGENCY MEDICINE
Payer: COMMERCIAL

## 2024-10-02 VITALS
TEMPERATURE: 98.2 F | DIASTOLIC BLOOD PRESSURE: 70 MMHG | OXYGEN SATURATION: 97 % | WEIGHT: 146 LBS | HEIGHT: 67 IN | HEART RATE: 93 BPM | SYSTOLIC BLOOD PRESSURE: 142 MMHG | BODY MASS INDEX: 22.91 KG/M2 | RESPIRATION RATE: 18 BRPM

## 2024-10-02 DIAGNOSIS — M25.571 ACUTE RIGHT ANKLE PAIN: Primary | ICD-10-CM

## 2024-10-02 DIAGNOSIS — M79.7 FIBROMYALGIA: ICD-10-CM

## 2024-10-02 PROCEDURE — 99283 EMERGENCY DEPT VISIT LOW MDM: CPT

## 2024-10-02 PROCEDURE — 6370000000 HC RX 637 (ALT 250 FOR IP): Performed by: EMERGENCY MEDICINE

## 2024-10-02 PROCEDURE — 73610 X-RAY EXAM OF ANKLE: CPT

## 2024-10-02 PROCEDURE — 73630 X-RAY EXAM OF FOOT: CPT

## 2024-10-02 RX ORDER — GABAPENTIN 100 MG/1
100 CAPSULE ORAL 3 TIMES DAILY
Qty: 21 CAPSULE | Refills: 0 | Status: SHIPPED | OUTPATIENT
Start: 2024-10-02 | End: 2024-10-09

## 2024-10-02 RX ORDER — GABAPENTIN 100 MG/1
100 CAPSULE ORAL 3 TIMES DAILY
Qty: 21 CAPSULE | Refills: 0 | Status: SHIPPED | OUTPATIENT
Start: 2024-10-02 | End: 2024-10-02

## 2024-10-02 RX ORDER — HYDROCODONE BITARTRATE AND ACETAMINOPHEN 5; 325 MG/1; MG/1
1 TABLET ORAL ONCE
Status: COMPLETED | OUTPATIENT
Start: 2024-10-02 | End: 2024-10-02

## 2024-10-02 RX ADMIN — HYDROCODONE BITARTRATE AND ACETAMINOPHEN 1 TABLET: 5; 325 TABLET ORAL at 21:23

## 2024-10-02 ASSESSMENT — PAIN SCALES - GENERAL: PAINLEVEL_OUTOF10: 9

## 2024-10-03 NOTE — ED PROVIDER NOTES
the results are reviewed by myself, and all abnormals are listed below.  Labs Reviewed - No data to display    Vitals Reviewed:    Vitals:    10/02/24 2053   BP: (!) 142/70   Pulse: 93   Resp: 18   Temp: 98.2 °F (36.8 °C)   TempSrc: Oral   SpO2: 97%   Weight: 66.2 kg (146 lb)   Height: 1.702 m (5' 7\")     MEDICATIONS GIVEN TO PATIENT THIS ENCOUNTER:  Orders Placed This Encounter   Medications    HYDROcodone-acetaminophen (NORCO) 5-325 MG per tablet 1 tablet    gabapentin (NEURONTIN) 100 MG capsule     Sig: Take 1 capsule by mouth 3 times daily for 7 days. Max Daily Amount: 300 mg     Dispense:  21 capsule     Refill:  0     DISCHARGE PRESCRIPTIONS:  New Prescriptions    GABAPENTIN (NEURONTIN) 100 MG CAPSULE    Take 1 capsule by mouth 3 times daily for 7 days. Max Daily Amount: 300 mg     PHYSICIAN CONSULTS ORDERED THIS ENCOUNTER:  None  FINAL IMPRESSION      1. Acute right ankle pain    2. Fibromyalgia          DISPOSITION/PLAN   DISPOSITION Decision To Discharge 10/02/2024 10:45:18 PM  Condition at Disposition: Data Unavailable      OUTPATIENT FOLLOW UP THE PATIENT:  Michelle Funes DO  [address] [facility] [id number] [phone]    Schedule an appointment as soon as possible for a visit in 1 week        Erica B Goldberger, MD Goldberger, Erica B, MD  10/03/24 7046

## 2024-10-03 NOTE — DISCHARGE INSTRUCTIONS
Keep the leg elevated and give it rest is much as possible.  Cold compresses and keeping it wrapped can help with healing and pain.    Low-dose gabapentin is being started today due to issues with complaints of your restless leg syndrome and spasming.  This medication is a controlled substance and needs to be monitored by your doctor.  Please discuss continuing this medication with your doctor.

## 2025-03-03 ENCOUNTER — HOSPITAL ENCOUNTER (OUTPATIENT)
Dept: CT IMAGING | Age: 70
Discharge: HOME | End: 2025-03-05
Payer: MEDICARE

## 2025-03-03 DIAGNOSIS — J04.0 LARYNGITIS: ICD-10-CM

## 2025-03-03 LAB
CREAT SERPL-MCNC: 0.8 MG/DL (ref 0.5–0.9)
GFR, ESTIMATED: 76 ML/MIN/1.73M2

## 2025-03-03 PROCEDURE — 2580000003 HC RX 258: Performed by: NURSE PRACTITIONER

## 2025-03-03 PROCEDURE — 2500000003 HC RX 250 WO HCPCS: Performed by: NURSE PRACTITIONER

## 2025-03-03 PROCEDURE — 82565 ASSAY OF CREATININE: CPT

## 2025-03-03 PROCEDURE — 70491 CT SOFT TISSUE NECK W/DYE: CPT

## 2025-03-03 PROCEDURE — 6360000004 HC RX CONTRAST MEDICATION: Performed by: NURSE PRACTITIONER

## 2025-03-03 PROCEDURE — 36415 COLL VENOUS BLD VENIPUNCTURE: CPT

## 2025-03-03 RX ORDER — 0.9 % SODIUM CHLORIDE 0.9 %
100 INTRAVENOUS SOLUTION INTRAVENOUS ONCE
Status: COMPLETED | OUTPATIENT
Start: 2025-03-03 | End: 2025-03-03

## 2025-03-03 RX ORDER — SODIUM CHLORIDE 0.9 % (FLUSH) 0.9 %
10 SYRINGE (ML) INJECTION PRN
Status: DISCONTINUED | OUTPATIENT
Start: 2025-03-03 | End: 2025-03-06 | Stop reason: HOSPADM

## 2025-03-03 RX ORDER — IOPAMIDOL 755 MG/ML
75 INJECTION, SOLUTION INTRAVASCULAR
Status: COMPLETED | OUTPATIENT
Start: 2025-03-03 | End: 2025-03-03

## 2025-03-03 RX ADMIN — IOPAMIDOL 75 ML: 755 INJECTION, SOLUTION INTRAVENOUS at 10:53

## 2025-03-03 RX ADMIN — SODIUM CHLORIDE 100 ML: 9 INJECTION, SOLUTION INTRAVENOUS at 10:54

## 2025-03-03 RX ADMIN — SODIUM CHLORIDE, PRESERVATIVE FREE 10 ML: 5 INJECTION INTRAVENOUS at 10:54

## 2025-06-13 LAB
BASOPHILS ABSOLUTE: 0.08 10*3/UL (ref 0–0.2)
BASOPHILS RELATIVE PERCENT: 0.8 % (ref 0–1)
EOSINOPHILS ABSOLUTE: 0.4 10*3/UL (ref 0–0.5)
EOSINOPHILS RELATIVE PERCENT: 4.1 % (ref 0–6)
HCT VFR BLD CALC: 42 % (ref 36–45)
HEMOGLOBIN: 13.8 G/DL (ref 12–15)
IGE: 24 IU/ML (ref 0–100)
IMMATURE GRANULOCYTES %: 1.3 % (ref 0–1)
IMMATURE GRANULOCYTES ABSOLUTE: 0.13 10*3/UL (ref 0–0.2)
LYMPHOCYTES ABSOLUTE: 2.4 10*3/UL (ref 1.2–4)
LYMPHOCYTES RELATIVE PERCENT: 24.6 % (ref 20–45)
MCH RBC QN AUTO: 29.6 PG (ref 27–33)
MCHC RBC AUTO-ENTMCNC: 32.9 G/DL (ref 32–35)
MCV RBC AUTO: 89.9 FL (ref 82–98)
MONOCYTES ABSOLUTE: 0.65 10*3/UL (ref 0.1–1)
MONOCYTES RELATIVE PERCENT: 6.7 % (ref 5–12)
NEUTROPHILS ABSOLUTE: 6.09 10*3/UL (ref 1.6–7.6)
NEUTROPHILS RELATIVE PERCENT: 62.5 % (ref 40–72)
NRBC AUTOMATED: 0 %
PDW BLD-RTO: 13.5 % (ref 11.5–15)
PLATELET # BLD: 261 10*3/UL (ref 150–400)
RBC # BLD: 4.67 10*6/UL (ref 3.8–5)
WBC # BLD: 9.75 10*3/UL (ref 4–10.6)